# Patient Record
Sex: FEMALE | Race: AMERICAN INDIAN OR ALASKA NATIVE | NOT HISPANIC OR LATINO | Employment: OTHER | URBAN - METROPOLITAN AREA
[De-identification: names, ages, dates, MRNs, and addresses within clinical notes are randomized per-mention and may not be internally consistent; named-entity substitution may affect disease eponyms.]

---

## 2017-04-29 PROBLEM — K85.90 ACUTE PANCREATITIS: Status: ACTIVE | Noted: 2017-04-29

## 2017-04-30 PROBLEM — J43.9 PULMONARY EMPHYSEMA: Chronic | Status: ACTIVE | Noted: 2017-04-30

## 2017-04-30 PROBLEM — F41.9 CHRONIC ANXIETY: Status: ACTIVE | Noted: 2017-04-30

## 2017-04-30 PROBLEM — R11.0 NAUSEA: Status: ACTIVE | Noted: 2017-04-30

## 2017-04-30 PROBLEM — M47.816 DEGENERATIVE JOINT DISEASE (DJD) OF LUMBAR SPINE: Chronic | Status: ACTIVE | Noted: 2017-04-30

## 2017-04-30 PROBLEM — K21.9 GERD (GASTROESOPHAGEAL REFLUX DISEASE): Chronic | Status: ACTIVE | Noted: 2017-04-30

## 2017-04-30 PROBLEM — R31.0 HEMATURIA, GROSS: Status: ACTIVE | Noted: 2017-04-30

## 2017-04-30 PROBLEM — I10 BENIGN ESSENTIAL HYPERTENSION: Chronic | Status: ACTIVE | Noted: 2017-04-30

## 2017-04-30 PROBLEM — R10.13 EPIGASTRIC ABDOMINAL PAIN: Status: ACTIVE | Noted: 2017-04-30

## 2017-05-04 ENCOUNTER — TELEPHONE (OUTPATIENT)
Dept: GASTROENTEROLOGY | Facility: CLINIC | Age: 57
End: 2017-05-04

## 2017-05-04 DIAGNOSIS — K85.90 PANCREATITIS, UNSPECIFIED PANCREATITIS TYPE: Primary | ICD-10-CM

## 2017-05-10 ENCOUNTER — TELEPHONE (OUTPATIENT)
Dept: GASTROENTEROLOGY | Facility: CLINIC | Age: 57
End: 2017-05-10

## 2017-05-11 ENCOUNTER — TELEPHONE (OUTPATIENT)
Dept: ENDOSCOPY | Facility: HOSPITAL | Age: 57
End: 2017-05-11

## 2017-05-11 ENCOUNTER — TELEPHONE (OUTPATIENT)
Dept: GASTROENTEROLOGY | Facility: CLINIC | Age: 57
End: 2017-05-11

## 2017-05-11 RX ORDER — ASPIRIN 81 MG/1
81 TABLET ORAL DAILY
COMMUNITY
End: 2020-04-08 | Stop reason: ALTCHOICE

## 2017-05-11 NOTE — TELEPHONE ENCOUNTER
----- Message from Vika Peterson MA sent at 5/11/2017  8:56 AM CDT -----  Contact: [phone]  Art merino    Returning a call to Mena LUNA.    [phone]

## 2017-05-11 NOTE — TELEPHONE ENCOUNTER
Spoke with patient about Hx, allergies, meds and instructions for UEUS scheduled 5/19/17 at 1400.  Voices understanding.  Instructions mailed.

## 2017-05-19 ENCOUNTER — SURGERY (OUTPATIENT)
Age: 57
End: 2017-05-19

## 2017-05-19 ENCOUNTER — ANESTHESIA (OUTPATIENT)
Dept: ENDOSCOPY | Facility: HOSPITAL | Age: 57
End: 2017-05-19
Payer: COMMERCIAL

## 2017-05-19 ENCOUNTER — ANESTHESIA EVENT (OUTPATIENT)
Dept: ENDOSCOPY | Facility: HOSPITAL | Age: 57
End: 2017-05-19
Payer: COMMERCIAL

## 2017-05-19 ENCOUNTER — HOSPITAL ENCOUNTER (OUTPATIENT)
Facility: HOSPITAL | Age: 57
Discharge: HOME | End: 2017-05-19
Attending: INTERNAL MEDICINE | Admitting: INTERNAL MEDICINE
Payer: COMMERCIAL

## 2017-05-19 VITALS
HEIGHT: 67 IN | WEIGHT: 196 LBS | HEART RATE: 71 BPM | SYSTOLIC BLOOD PRESSURE: 134 MMHG | OXYGEN SATURATION: 100 % | BODY MASS INDEX: 30.76 KG/M2 | RESPIRATION RATE: 18 BRPM | TEMPERATURE: 98 F | DIASTOLIC BLOOD PRESSURE: 46 MMHG

## 2017-05-19 DIAGNOSIS — K85.90 ACUTE PANCREATITIS, UNSPECIFIED COMPLICATION STATUS, UNSPECIFIED PANCREATITIS TYPE: Primary | ICD-10-CM

## 2017-05-19 DIAGNOSIS — K85.90 PANCREATITIS: ICD-10-CM

## 2017-05-19 PROCEDURE — 37000008 HC ANESTHESIA 1ST 15 MINUTES: Performed by: INTERNAL MEDICINE

## 2017-05-19 PROCEDURE — 25000003 PHARM REV CODE 250: Performed by: REGISTERED NURSE

## 2017-05-19 PROCEDURE — 37000009 HC ANESTHESIA EA ADD 15 MINS: Performed by: INTERNAL MEDICINE

## 2017-05-19 PROCEDURE — D9220A PRA ANESTHESIA: Mod: CRNA,,, | Performed by: REGISTERED NURSE

## 2017-05-19 PROCEDURE — 43259 EGD US EXAM DUODENUM/JEJUNUM: CPT | Performed by: INTERNAL MEDICINE

## 2017-05-19 PROCEDURE — 43259 EGD US EXAM DUODENUM/JEJUNUM: CPT | Mod: ,,, | Performed by: INTERNAL MEDICINE

## 2017-05-19 PROCEDURE — D9220A PRA ANESTHESIA: Mod: ANES,,, | Performed by: ANESTHESIOLOGY

## 2017-05-19 PROCEDURE — 63600175 PHARM REV CODE 636 W HCPCS: Performed by: REGISTERED NURSE

## 2017-05-19 PROCEDURE — 25000003 PHARM REV CODE 250: Performed by: INTERNAL MEDICINE

## 2017-05-19 RX ORDER — LIDOCAINE HYDROCHLORIDE 10 MG/ML
INJECTION, SOLUTION INTRAVENOUS
Status: DISCONTINUED | OUTPATIENT
Start: 2017-05-19 | End: 2017-05-19

## 2017-05-19 RX ORDER — PROPOFOL 10 MG/ML
VIAL (ML) INTRAVENOUS CONTINUOUS PRN
Status: DISCONTINUED | OUTPATIENT
Start: 2017-05-19 | End: 2017-05-19

## 2017-05-19 RX ORDER — MIDAZOLAM HYDROCHLORIDE 1 MG/ML
INJECTION, SOLUTION INTRAMUSCULAR; INTRAVENOUS
Status: DISCONTINUED | OUTPATIENT
Start: 2017-05-19 | End: 2017-05-19

## 2017-05-19 RX ORDER — IBUPROFEN AND FAMOTIDINE 26.6; 8 MG/1; MG/1
TABLET ORAL 3 TIMES DAILY
COMMUNITY
End: 2017-10-03 | Stop reason: CLARIF

## 2017-05-19 RX ORDER — FENTANYL CITRATE 50 UG/ML
INJECTION, SOLUTION INTRAMUSCULAR; INTRAVENOUS
Status: DISCONTINUED | OUTPATIENT
Start: 2017-05-19 | End: 2017-05-19

## 2017-05-19 RX ORDER — SODIUM CHLORIDE 9 MG/ML
INJECTION, SOLUTION INTRAVENOUS CONTINUOUS
Status: DISCONTINUED | OUTPATIENT
Start: 2017-05-19 | End: 2017-05-22 | Stop reason: HOSPADM

## 2017-05-19 RX ADMIN — FENTANYL CITRATE 100 MCG: 50 INJECTION, SOLUTION INTRAMUSCULAR; INTRAVENOUS at 01:05

## 2017-05-19 RX ADMIN — LIDOCAINE HYDROCHLORIDE 100 MG: 10 INJECTION, SOLUTION INTRAVENOUS at 01:05

## 2017-05-19 RX ADMIN — MIDAZOLAM HYDROCHLORIDE 2 MG: 1 INJECTION, SOLUTION INTRAMUSCULAR; INTRAVENOUS at 01:05

## 2017-05-19 RX ADMIN — SODIUM CHLORIDE: 0.9 INJECTION, SOLUTION INTRAVENOUS at 01:05

## 2017-05-19 RX ADMIN — PROPOFOL 200 MCG/KG/MIN: 10 INJECTION, EMULSION INTRAVENOUS at 01:05

## 2017-05-19 NOTE — DISCHARGE INSTRUCTIONS
Endoscopic Ultrasound (EUS)    An endoscopic ultrasound (EUS) is a test to look at the inside of your gastrointestinal (GI) tract. It's commonly used to look for cancers or growths in the esophagus, stomach, pancreas, liver, and rectum. It can help to stage cancer (see how advanced a cancer is). EUS may also be used to help diagnose certain diseases or to drain cysts or abscesses.  What is EUS?  EUS shows both ultrasound images and live video of the GI tract. During the test, a flexible tube called an endoscope (scope) is used. At the end of the scope is a tiny video camera and light. The video camera sends live images to a monitor. The scope also contains a very small ultrasound device. This uses sound waves to create images and send them to a monitor.  A needle is passed through the scope. The needle can be used take a small sample of tissue for testing. This is called a biopsy. The needle can be used to take a sample of fluid. This is called fine-needle aspiration (FNA).  Risks and possible complications of EUS  Risks and possible complications include the following:  · Bleeding  · Infection  · A perforation (hole) in the digestive tract   · Risks of sedation or anesthesia   Before the test  Be prepared prior to the test:  · Tell your healthcare provider what medicine you take. This includes vitamins, herbs, and over-the-counter medicine. It also includes any blood thinners, such as warfarin, clopidogrel, ibuprofen, or daily aspirin. Ask your healthcare provider if you need to stop taking some or all of them before the test.  · You may be prescribed antibiotics to take before or after the test. This depends on the area being studied and what is done during the test. These medicines help prevent infection.  · Carefully follow the instructions for preparing for the test to make sure results are accurate. Instructions may include:  ¨ If youre having an EUS of the upper GI tract (esophagus, stomach, duodenum,  pancreas, liver):  § Do not eat or drink for 6 hours before the test.  ¨ If youre having an EUS of the lower GI tract (rectum):  § Before the test, do bowel prep as instructed to clean your rectum of stool. This may involve a clear liquid diet and using a laxative (liquid or pills) the night before the test. Or it may mean doing one or more enemas the morning of the test.  § Do not eat or drink for 6 hours before the test.  · Be sure to arrive on time at the facility. Bring your identification and health insurance card. Leave valuables at home. If you have them, bring X-rays or other test results with you.  Let the healthcare provider know  For your safety, tell the healthcare provider if you:  · Take insulin. Your dose may need to be changed on the day of your test.  · Are allergic to latex.  · Have any other allergies.  · Are taking blood thinners.   During the test  An endoscopic ultrasound usually takes place in a hospital. The procedure itself may take 1 to 2 hours. You will likely go home soon afterward. During the test:  · You lie on your left side on an exam table.  · An intravenous (IV) line will be put into a vein in your arm or hand. This line supplies fluids and medicines. To keep you comfortable during the test, you will be given a sedative medicine. This medicine prevents discomfort and will make you sleepy.  · If you are having an EUS of the upper GI tract, local anesthetic may be sprayed in your throat. This will help you be more comfortable as the healthcare provider inserts the scope. The healthcare provider then gently puts the flexible scope into your mouth or nose and down your throat.  · If youre having an EUS of the lower GI tract, the healthcare provider gently puts the flexible scope into your anus.  · During the test, the scope sends live video and ultrasound images from inside your body to nearby monitors. These are used to examine your GI tract. Specialized procedures, such as drainage,  are done as needed.  · The healthcare provider may discuss the results with you soon after the test. Biopsy results take several  days.  · In most cases, you can go home within a few hours of the test. When you leave the facility, have an adult family member or friend drive you, even if you don't feel that sleepy.  After the test  Here is what to expect after the test:  · You may feel tired from the sedative. This should wear off by the end of the day.  · If you had an upper digestive endoscopy, your throat may feel sore for a day or two. Over-the-counter sore throat lozenges and spray should help.  · You can eat and drink normally as soon as the test is done.  When to call the healthcare provider  Call your healthcare provider if you notice any of the following:  · Fever of 100.4°F (38.0°C) or higher, or as advised by your healthcare provider  · Shortness of breath  · Vomiting blood, blood in stool, or black stools  · Coughing or hoarse voice that wont go away   Date Last Reviewed: 7/1/2016  © 8255-4851 [employer]. [address] [place] [address]. All rights reserved. This information is not intended as a substitute for professional medical care. Always follow your healthcare professional's instructions.

## 2017-05-19 NOTE — ANESTHESIA POSTPROCEDURE EVALUATION
"Anesthesia Post Evaluation    Patient: Rosalia Fagan    Procedure(s) Performed: Procedure(s) (LRB):  ULTRASOUND-ENDOSCOPIC-UPPER (N/A)    Final Anesthesia Type: general  Patient location during evaluation: PACU  Patient participation: Yes- Able to Participate  Level of consciousness: awake and alert  Post-procedure vital signs: reviewed and stable  Pain management: adequate  Airway patency: patent  PONV status at discharge: No PONV  Anesthetic complications: no      Cardiovascular status: blood pressure returned to baseline  Respiratory status: unassisted  Hydration status: euvolemic  Follow-up not needed.        Visit Vitals    BP (!) 104/55 (BP Location: Left arm, Patient Position: Lying, BP Method: Automatic)    Pulse 85    Temp 36.6 °C (97.9 °F) (Temporal)    Resp 18    Ht 5' 7" (1.702 m)    Wt 88.9 kg (196 lb)    LMP  (LMP Unknown)    SpO2 97%    Breastfeeding No    BMI 30.7 kg/m2       Pain/Rhea Score: Pain Assessment Performed: Yes (5/19/2017  1:28 PM)  Presence of Pain: non-verbal indicators absent (5/19/2017  1:28 PM)  Rhea Score: 8 (5/19/2017  1:28 PM)      "

## 2017-05-19 NOTE — TRANSFER OF CARE
"Anesthesia Transfer of Care Note    Patient: Rosalia Fagan    Procedure(s) Performed: Procedure(s) (LRB):  ULTRASOUND-ENDOSCOPIC-UPPER (N/A)    Patient location: [facility]    Anesthesia Type: general    Transport from OR: Transported from OR on 2-3 L/min O2 by NC with adequate spontaneous ventilation    Post pain: adequate analgesia    Post assessment: no apparent anesthetic complications    Post vital signs: stable    Level of consciousness: awake    Nausea/Vomiting: no nausea/vomiting    Complications: none    Transfer of care protocol was followed      Last vitals:   Visit Vitals    BP (!) 104/55 (BP Location: Left arm, Patient Position: Lying, BP Method: Automatic)    Pulse 85    Temp 36.6 °C (97.9 °F) (Temporal)    Resp 18    Ht 5' 7" (1.702 m)    Wt 88.9 kg (196 lb)    LMP  (LMP Unknown)    SpO2 97%    Breastfeeding No    BMI 30.7 kg/m2     "

## 2017-05-19 NOTE — ANESTHESIA PREPROCEDURE EVALUATION
05/19/2017  Rosalia Fagan is a 56 y.o., female.    Anesthesia Evaluation    I have reviewed the Patient Summary Reports.     I have reviewed the Medications.     Review of Systems  Anesthesia Hx:  History of prior surgery of interest to airway management or planning:  Denies Personal Hx of Anesthesia complications.   Cardiovascular:   Hypertension    Pulmonary:   COPD    Hepatic/GI:   GERD Pancreatitis   Musculoskeletal:   Arthritis     Psych:   anxiety          Physical Exam  General:  Obesity    Airway/Jaw/Neck:  Airway Findings: Mouth Opening: Normal Tongue: Normal  General Airway Assessment: Adult  Mallampati: III  Improves to II with phonation.  TM Distance: Normal, at least 6 cm  Jaw/Neck Findings:  Neck ROM: Normal ROM       Chest/Lungs:  Chest/Lungs Findings: Normal Respiratory Rate     Heart/Vascular:  Heart Findings: Rhythm: Regular Rhythm        Mental Status:  Mental Status Findings:  Alert and Oriented         Anesthesia Plan  Type of Anesthesia, risks & benefits discussed:  Anesthesia Type:  general, spinal  Patient's Preference: davmelvinae  Intra-op Monitoring Plan: standard ASA monitors  Intra-op Monitoring Plan Comments:   Post Op Pain Control Plan:   Post Op Pain Control Plan Comments:   Induction:   IV  Beta Blocker:  Patient is not currently on a Beta-Blocker (No further documentation required).       Informed Consent: Patient understands risks and agrees with Anesthesia plan.  Questions answered. Anesthesia consent signed with patient.  ASA Score: 3     Day of Surgery Review of History & Physical:    H&P update referred to the surgeon.     Anesthesia Plan Notes: NPO confirmed.   No history of anesthesia problems.         Ready For Surgery From Anesthesia Perspective.

## 2017-05-19 NOTE — PLAN OF CARE
D/C instructions given to pt and family. Pt. Tolerating po fluids and denies pain and n/v at this time. IV dc'd. VSS. Family at bs for d/c.

## 2017-05-19 NOTE — DISCHARGE SUMMARY
Discharge Summary/Instructions for after an Upper EUS    Patient Name: Rosalia Fagan  Patient MRN: [id number]  Patient YOB: 1960    Friday, May 19, 2017  Layton Michaels MD    1.  Do Not eat or drink anything for 1 hour.  Try sips of water first.  If tolerated, resume your regular diet or one recommended by your physician.  2.  Do not drive, operate machinery, make critical decisions, or do activities that require coordination or balance for 24 hours.  3.  You may experience a sore throat for 24 to 48 hours.  You may use throat lozenges or gargle with warm salt water to relieve the discomfort.  4.  Because air was put into your stomach during the procedure, you may experience some belching.  5.  Go directly to the emergency room if you notice any of the following:     Chills and/or fever over 101   Persistent vomiting or vomiting with blood   Severe abdominal pain, other than gas cramps   Severe chest pain   Black, tarry stools    Your doctor recommends these additional instructions:    You are being discharged to home.   Your clinical course will be observed.    If you have any questions on the above instructions, call the GI Lab and ask for an endoscopy nurse.    If you have any problems, please call your physician.  EMERGENCY PHONE NUMBER: [phone]  LAB RESULTS: [phone]

## 2017-05-19 NOTE — IP AVS SNAPSHOT
[facility]  [address]  [facility] [address]  Phone: [phone]           Patient Discharge Instructions   Our goal is to set you up for success. This packet includes information on your condition, medications, and your home care.  It will help you care for yourself to prevent having to return to the hospital.     Please ask your nurse if you have any questions.      There are many details to remember when preparing to leave the hospital. Here is what you will need to do:    1. Take your medicine. If you are prescribed medications, review your Medication List on the following pages. You may have new medications to  at the pharmacy and others that you'll need to stop taking. Review the instructions for how and when to take your medications. Talk with your doctor or nurses if you are unsure of what to do.     2. Go to your follow-up appointments. Specific follow-up information is listed in the following pages. Your may be contacted by a nurse or clinical provider about future appointments. Be sure we have all of the phone numbers to reach you. Please contact your provider's office if you are unable to make an appointment.     3. Watch for warning signs. Your doctor or nurse will give you detailed warning signs to watch for and when to call for assistance. These instructions may also include educational information about your condition. If you experience any of warning signs to your health, call your doctor.           [facility]  Unless otherwise directed by your provider, please   contact [facility], our nurse care line   that is available for 24/7 assistance.     [phone] (toll-free)     Registered nurses in the [facility]   provide: appointment scheduling, clinical advisement, health education, and other advisory services.                  ** Verify the list of medication(s) below is accurate and up to date. Carry this with you in case of  emergency. If your medications have changed, please notify your healthcare provider.             Medication List      CONTINUE taking these medications        Additional Info                      alprazolam 0.5 MG tablet   Commonly known as:  XANAX   Refills:  0   Dose:  0.5 mg    Instructions:  Take 0.5 mg by mouth 3 (three) times daily as needed for Anxiety.     Begin Date    AM    Noon    PM    Bedtime       amlodipine-benazepril 10-20mg 10-20 mg per capsule   Commonly known as:  LOTREL   Refills:  0   Dose:  1 capsule    Instructions:  Take 1 capsule by mouth once daily.     Begin Date    AM    Noon    PM    Bedtime       aspirin 81 MG EC tablet   Commonly known as:  ECOTRIN   Refills:  0   Dose:  81 mg    Instructions:  Take 81 mg by mouth once daily.     Begin Date    AM    Noon    PM    Bedtime       cyclobenzaprine 10 MG tablet   Commonly known as:  FLEXERIL   Refills:  0   Dose:  10 mg    Instructions:  Take 10 mg by mouth 3 (three) times daily as needed for Muscle spasms.     Begin Date    AM    Noon    PM    Bedtime       DUEXIS 800-26.6 mg Tab   Refills:  0   Generic drug:  ibuprofen-famotidine    Instructions:  Take by mouth 3 (three) times daily.     Begin Date    AM    Noon    PM    Bedtime       gabapentin 300 MG capsule   Commonly known as:  NEURONTIN   Refills:  0   Dose:  300 mg    Instructions:  Take 300 mg by mouth 3 (three) times daily.     Begin Date    AM    Noon    PM    Bedtime       hydrocodone-acetaminophen 10-325mg  mg Tab   Commonly known as:  NORCO   Refills:  0   Dose:  1 tablet    Instructions:  Take 1 tablet by mouth every 6 (six) hours as needed for Pain.     Begin Date    AM    Noon    PM    Bedtime       spironolactone 100 MG tablet   Commonly known as:  ALDACTONE   Refills:  0   Dose:  100 mg    Instructions:  Take 100 mg by mouth once daily.     Begin Date    AM    Noon    PM    Bedtime                  Please bring to all follow up appointments:    1. A copy of your  discharge instructions.  2. All medicines you are currently taking in their original bottles.  3. Identification and insurance card.    Please arrive 15 minutes ahead of scheduled appointment time.    Please call 24 hours in advance if you must reschedule your appointment and/or time.        Your Future Surgeries/Procedures     May 19, 2017   Surgery with Layton Michaels MD   [facility] ([facility])    [address] [facility] [address]   [phone]                Discharge Instructions     Future Orders    Activity as tolerated     Call MD for:  difficulty breathing, headache or visual disturbances     Call MD for:  persistent nausea and vomiting     Call MD for:  severe uncontrolled pain     Call MD for:  temperature >100.4     Diet general     Questions:    Total calories:      Fat restriction, if any:      Protein restriction, if any:      Na restriction, if any:      Fluid restriction:      Additional restrictions:          Discharge Instructions         Endoscopic Ultrasound (EUS)    An endoscopic ultrasound (EUS) is a test to look at the inside of your gastrointestinal (GI) tract. It's commonly used to look for cancers or growths in the esophagus, stomach, pancreas, liver, and rectum. It can help to stage cancer (see how advanced a cancer is). EUS may also be used to help diagnose certain diseases or to drain cysts or abscesses.  What is EUS?  EUS shows both ultrasound images and live video of the GI tract. During the test, a flexible tube called an endoscope (scope) is used. At the end of the scope is a tiny video camera and light. The video camera sends live images to a monitor. The scope also contains a very small ultrasound device. This uses sound waves to create images and send them to a monitor.  A needle is passed through the scope. The needle can be used take a small sample of tissue for testing. This is called a biopsy. The needle can be used to  take a sample of fluid. This is called fine-needle aspiration (FNA).  Risks and possible complications of EUS  Risks and possible complications include the following:  · Bleeding  · Infection  · A perforation (hole) in the digestive tract   · Risks of sedation or anesthesia   Before the test  Be prepared prior to the test:  · Tell your healthcare provider what medicine you take. This includes vitamins, herbs, and over-the-counter medicine. It also includes any blood thinners, such as warfarin, clopidogrel, ibuprofen, or daily aspirin. Ask your healthcare provider if you need to stop taking some or all of them before the test.  · You may be prescribed antibiotics to take before or after the test. This depends on the area being studied and what is done during the test. These medicines help prevent infection.  · Carefully follow the instructions for preparing for the test to make sure results are accurate. Instructions may include:  ¨ If youre having an EUS of the upper GI tract (esophagus, stomach, duodenum, pancreas, liver):  § Do not eat or drink for 6 hours before the test.  ¨ If youre having an EUS of the lower GI tract (rectum):  § Before the test, do bowel prep as instructed to clean your rectum of stool. This may involve a clear liquid diet and using a laxative (liquid or pills) the night before the test. Or it may mean doing one or more enemas the morning of the test.  § Do not eat or drink for 6 hours before the test.  · Be sure to arrive on time at the facility. Bring your identification and health insurance card. Leave valuables at home. If you have them, bring X-rays or other test results with you.  Let the healthcare provider know  For your safety, tell the healthcare provider if you:  · Take insulin. Your dose may need to be changed on the day of your test.  · Are allergic to latex.  · Have any other allergies.  · Are taking blood thinners.   During the test  An endoscopic ultrasound usually takes  place in a hospital. The procedure itself may take 1 to 2 hours. You will likely go home soon afterward. During the test:  · You lie on your left side on an exam table.  · An intravenous (IV) line will be put into a vein in your arm or hand. This line supplies fluids and medicines. To keep you comfortable during the test, you will be given a sedative medicine. This medicine prevents discomfort and will make you sleepy.  · If you are having an EUS of the upper GI tract, local anesthetic may be sprayed in your throat. This will help you be more comfortable as the healthcare provider inserts the scope. The healthcare provider then gently puts the flexible scope into your mouth or nose and down your throat.  · If youre having an EUS of the lower GI tract, the healthcare provider gently puts the flexible scope into your anus.  · During the test, the scope sends live video and ultrasound images from inside your body to nearby monitors. These are used to examine your GI tract. Specialized procedures, such as drainage, are done as needed.  · The healthcare provider may discuss the results with you soon after the test. Biopsy results take several  days.  · In most cases, you can go home within a few hours of the test. When you leave the facility, have an adult family member or friend drive you, even if you don't feel that sleepy.  After the test  Here is what to expect after the test:  · You may feel tired from the sedative. This should wear off by the end of the day.  · If you had an upper digestive endoscopy, your throat may feel sore for a day or two. Over-the-counter sore throat lozenges and spray should help.  · You can eat and drink normally as soon as the test is done.  When to call the healthcare provider  Call your healthcare provider if you notice any of the following:  · Fever of 100.4°F (38.0°C) or higher, or as advised by your healthcare provider  · Shortness of breath  · Vomiting blood, blood in stool, or  black stools  · Coughing or hoarse voice that wont go away   Date Last Reviewed: 7/1/2016 © 2000-2016 [employer]. [address] [place] [address], [place] [address]. All rights reserved. This information is not intended as a substitute for professional medical care. Always follow your healthcare professional's instructions.          Instructions    Discharge Summary/Instructions for after an Upper EUS  Patient Name: Rosalia Fagan  Patient MRN: [id number]  Patient YOB: 1960  Friday, May 19, 2017  Layton Michaels MD  1.  Do Not eat or drink anything for 1 hour.  Try sips of water first.  If   tolerated, resume your regular diet or one recommended by your physician.  2.  Do not drive, operate machinery, make critical decisions, or do   activities that require coordination or balance for 24 hours.  3.  You may experience a sore throat for 24 to 48 hours.  You may use throat   lozenges or gargle with warm salt water to relieve the discomfort.  4.  Because air was put into your stomach during the procedure, you may   experience some belching.  5.  Go directly to the emergency room if you notice any of the following:   Chills and/or fever over 101   Persistent vomiting or vomiting with blood   Severe abdominal pain, other than gas cramps   Severe chest pain   Black, tarry stools  Your doctor recommends these additional instructions:  You are being discharged to home.   Your clinical course will be observed.  If you have any questions on the above instructions, call the GI Lab and ask   for an endoscopy nurse.  If you have any problems, please call your physician.  EMERGENCY PHONE NUMBER: [phone]  LAB RESULTS: [phone]  Layton Michaels MD  5/19/2017 1:34:37 PM  This report has been verified and signed electronically.         Admission Information     Date & Time Provider Department CSN    5/19/2017 12:32 PM Layton Michaels MD [facility] [id number]      Care Providers      "Provider Role Specialty Primary office phone    Layton Michaels MD Attending Provider Gastroenterology [phone]    Layton Michaels MD Surgeon  Gastroenterology [phone]      Your Vitals Were     BP Pulse Temp Resp Height Weight    104/55 (BP Location: Left arm, Patient Position: Lying, BP Method: Automatic) 85 97.9 °F (36.6 °C) (Temporal) 18 5' 7" (1.702 m) 88.9 kg (196 lb)    Last Period SpO2 BMI          (LMP Unknown) 97% 30.7 kg/m2        Recent Lab Values     No lab values to display.      Allergies as of 5/19/2017     No Known Allergies      Advance Directives     An advance directive is a document which, in the event you are no longer able to make decisions for yourself, tells your healthcare team what kind of treatment you do or do not want to receive, or who you would like to make those decisions for you.  If you do not currently have an advance directive, [facility] encourages you to create one.  For more information call:  [phone]), [phone] ([phone]),  or log on to [email or web address].        Smoking Cessation     If you would like to quit smoking:   You may be eligible for free services if you are a [place] resident and started smoking cigarettes before September 1, 1988.  Call the Smoking Cessation [employer] ([facility]) toll free at (183) 651-9431 or (655) 193-5130.   Call 1-800-QUIT-NOW if you do not meet the above criteria.   Contact us via email: [email or web address]   View our website for more information: [email or web address]        Language Assistance Services     ATTENTION: Language assistance services are available, free of charge. Please call [phone].      ATENCIÓN: Si habla español, tiene a anne disposición servicios gratuitos de asistencia lingüística. Llame al [phone].     CHÚ Ý: N?u b?n nói Ti?ng Vi?t, có các d?ch v? h? tr? ngôn ng? mi?n phí dành cho b?n. G?i s? [phone].         [facility] complies with " applicable Federal civil rights laws and does not discriminate on the basis of race, color, national origin, age, disability, or sex.

## 2017-05-19 NOTE — PATIENT INSTRUCTIONS
Discharge Summary/Instructions for after an Upper EUS  Patient Name: Rosalia Fagan  Patient MRN: [id number]  Patient YOB: 1960  Friday, May 19, 2017  Layton Michaels MD  1.  Do Not eat or drink anything for 1 hour.  Try sips of water first.  If   tolerated, resume your regular diet or one recommended by your physician.  2.  Do not drive, operate machinery, make critical decisions, or do   activities that require coordination or balance for 24 hours.  3.  You may experience a sore throat for 24 to 48 hours.  You may use throat   lozenges or gargle with warm salt water to relieve the discomfort.  4.  Because air was put into your stomach during the procedure, you may   experience some belching.  5.  Go directly to the emergency room if you notice any of the following:   Chills and/or fever over 101   Persistent vomiting or vomiting with blood   Severe abdominal pain, other than gas cramps   Severe chest pain   Black, tarry stools  Your doctor recommends these additional instructions:  You are being discharged to home.   Your clinical course will be observed.  If you have any questions on the above instructions, call the GI Lab and ask   for an endoscopy nurse.  If you have any problems, please call your physician.  EMERGENCY PHONE NUMBER: [phone]  LAB RESULTS: [phone]  Layton Michaels MD  5/19/2017 1:34:37 PM  This report has been verified and signed electronically.

## 2017-05-19 NOTE — ANESTHESIA RELEASE NOTE
"Anesthesia Release from PACU Note    Patient: Rosalia Fagan    Procedure(s) Performed: Procedure(s) (LRB):  ULTRASOUND-ENDOSCOPIC-UPPER (N/A)    Anesthesia type: general    Post pain: Adequate analgesia    Post assessment: no apparent anesthetic complications    Last Vitals:   Visit Vitals    BP (!) 104/55 (BP Location: Left arm, Patient Position: Lying, BP Method: Automatic)    Pulse 85    Temp 36.6 °C (97.9 °F) (Temporal)    Resp 18    Ht 5' 7" (1.702 m)    Wt 88.9 kg (196 lb)    LMP  (LMP Unknown)    SpO2 97%    Breastfeeding No    BMI 30.7 kg/m2       Post vital signs: stable    Level of consciousness: awake    Nausea/Vomiting: no nausea/no vomiting    Complications: none    Airway Patency: patent    Respiratory: unassisted    Cardiovascular: stable and blood pressure at baseline    Hydration: euvolemic  "

## 2017-06-15 ENCOUNTER — OFFICE VISIT (OUTPATIENT)
Dept: NEPHROLOGY | Facility: CLINIC | Age: 57
End: 2017-06-15
Payer: COMMERCIAL

## 2017-06-15 VITALS
WEIGHT: 227.06 LBS | OXYGEN SATURATION: 99 % | DIASTOLIC BLOOD PRESSURE: 60 MMHG | SYSTOLIC BLOOD PRESSURE: 130 MMHG | HEART RATE: 71 BPM | HEIGHT: 67 IN | BODY MASS INDEX: 35.64 KG/M2

## 2017-06-15 DIAGNOSIS — N28.1 CYST OF LEFT KIDNEY: ICD-10-CM

## 2017-06-15 DIAGNOSIS — R31.29 MICROHEMATURIA: Primary | ICD-10-CM

## 2017-06-15 PROCEDURE — 99999 PR PBB SHADOW E&M-EST. PATIENT-LVL III: CPT | Mod: PBBFAC,,, | Performed by: INTERNAL MEDICINE

## 2017-06-15 PROCEDURE — 99204 OFFICE O/P NEW MOD 45 MIN: CPT | Mod: S$GLB,,, | Performed by: INTERNAL MEDICINE

## 2017-06-15 NOTE — PROGRESS NOTES
Subjective:       Patient ID: Rosalia Fagan is a 56 y.o.  or  female who presents for new evaluation of Other (kidney cyst )    HPI     Ms. Hendricks was seen in nephrology clinic for new patient evaluation for kidney cyst. This is a self referral. Pt arrived in the clinic accompanied by her sister. Pt has hypertension, has been on benazepril based regimen, DJD, has been taking Ibuprofen based pain medicines, anxiety, h/o tobacco smoking which she quit only in 4/17 while hospitalized, emphysema and other medical problems.     She reports she was hospitalized in 4/17 in [place] for abdominal pain. She was noticed to have leukocytosis. She underwent CT AP with IV contrast from 4/29/17. It suggested pancreatitis involving head of pancreas. It also showed incidentally found left adrenal adenoma and left renal cyst size 2.6 cm. Radiology report did not comment on the nature of the cyst but there was no mention of any solid mass or complex cyst. Pt had urinalysis from that hospital admission and it showed 20 RBC which on repeat test was down to 5 RBC. Urine culture was negative. Subsequent to this hospitalization pt had further work up by GI and had EUS and essentially informed there was no abnormality with her pancreas.    Pt reports due to finding of cyst on her kidney she herself and her siblings were alarmed as one of her brothers was diagnosed with kidney cancer in the past and he had to undergo kidney removal. She states she saw a local kidney specialist in [place] but she decided to get a second opinion and now wants to establish care with [facility].     She denies any dysuria/ blood in the urine/ flank pain/ fever/ weight loss/ loss of appetite. She has chronic back pain due to DJD and is possibly looking at back surgery.     Available labs were reviewed which showed creatinine of 0.7, K 3.8, Ca 8.8, Hb 14.4 from 5/2/17.    Review of Systems   Constitutional: Negative for activity change,  appetite change, chills, fatigue and fever.   HENT: Negative for ear discharge and ear pain.    Eyes: Negative for discharge and redness.   Respiratory: Negative for cough, shortness of breath and wheezing.    Cardiovascular: Negative for chest pain and leg swelling.   Gastrointestinal: Negative for blood in stool, diarrhea, nausea and vomiting.   Endocrine: Negative for polydipsia and polyuria.   Genitourinary: Negative for decreased urine volume, difficulty urinating, dysuria, flank pain, frequency and hematuria.   Musculoskeletal: Positive for back pain. Negative for myalgias.   Skin: Negative for pallor and rash.   Neurological: Negative for dizziness, light-headedness and headaches.   Psychiatric/Behavioral: Negative for behavioral problems. The patient is nervous/anxious.        Objective:      Physical Exam   Constitutional: She is oriented to person, place, and time. She appears well-developed and well-nourished. No distress.   HENT:   Head: Normocephalic and atraumatic.   Mouth/Throat: Oropharynx is clear and moist.   Eyes: Right eye exhibits no discharge. Left eye exhibits no discharge. No scleral icterus.   Neck: Neck supple.   Cardiovascular: Normal rate, regular rhythm and normal heart sounds.  Exam reveals no friction rub.    No murmur heard.  Pulmonary/Chest: Effort normal and breath sounds normal. No respiratory distress. She has no wheezes. She has no rales.   Abdominal: Soft. She exhibits no distension. There is no tenderness.   Abdominal obesity   Genitourinary:   Genitourinary Comments: deferred   Musculoskeletal: She exhibits no edema.   Neurological: She is alert and oriented to person, place, and time.   Speech normal, cognition normal, moving all 4 limbs   Skin: Skin is warm and dry.   Psychiatric: She has a normal mood and affect. Her behavior is normal.   Nursing note and vitals reviewed.      Assessment:       1. Microhematuria    2.      Left kidney cyst    Plan:     Pt has family h/o  kidney cancer. She has incidentally found left kidney cyst. CT AP with IV contrast from 4/29/17 does not describe any specific characteristic features of the cyst. But of note there is no mention of any solid mass. Pt does have microhematuria. Reason for her abdominal pain due to which she was hospitalized in 4/17 is not clear. She does not have any urinary or systemic symptoms. I explained this to her. Also discussed with her that she has incidentally found left adrenal adenoma and she would like to discuss this with her PCP to consider an endocrine evaluation. I explained to her that she need a follow up imaging of her left kidney cyst and confirm that there is no rapid growth and also to rule out a solid mass. Microhematuria could be related but she will also need urology evaluation of both these problems especially with her family h/o kidney cancer. She expressed understanding. I also encouraged her to avoid longstanding use of NSAID and risk of CKD especially with her having hypertension. I stressed importance of avoiding tobacco products. She will continue to follow with her PCP for hypertension management. She has as such stable creatinine.    Plan  - periodically monitor renal panel for electrolytes, acid base status, eGFR  - urology referral   - low salt diet  - follow urine studies for proteinuria, UA with microscopy, urine culture   - obtain US kidney for kidney size, rule out mass/ cyst  - avoid NSAID where possible  - continue ACE-I containing regimen for RAAS blockade    Total time spent was 45 minutes with >50% time spent in face to face evaluation, counseling about possible etiology, work up, monitoring, natural course of illness, recommendations.   RTC 6-8 weeks   Plan, labs, recommendations were discussed with patient, her questions were answered to her satisfaction.

## 2017-06-19 ENCOUNTER — TELEPHONE (OUTPATIENT)
Dept: NEPHROLOGY | Facility: CLINIC | Age: 57
End: 2017-06-19

## 2017-06-19 NOTE — TELEPHONE ENCOUNTER
I called pt to discuss the US results with her. Per radiology report she has a cyst on left kidney, size 3.1 cm. She has been referred to see urology and has appointment set up for tomorrow.

## 2017-06-27 ENCOUNTER — OFFICE VISIT (OUTPATIENT)
Dept: UROLOGY | Facility: CLINIC | Age: 57
End: 2017-06-27
Payer: COMMERCIAL

## 2017-06-27 VITALS
WEIGHT: 227.06 LBS | BODY MASS INDEX: 35.56 KG/M2 | SYSTOLIC BLOOD PRESSURE: 150 MMHG | HEART RATE: 75 BPM | DIASTOLIC BLOOD PRESSURE: 91 MMHG

## 2017-06-27 DIAGNOSIS — R31.29 MICROSCOPIC HEMATURIA: Primary | ICD-10-CM

## 2017-06-27 PROCEDURE — 99204 OFFICE O/P NEW MOD 45 MIN: CPT | Mod: 25,S$GLB,, | Performed by: PHYSICIAN ASSISTANT

## 2017-06-27 PROCEDURE — 88112 CYTOPATH CELL ENHANCE TECH: CPT | Performed by: PATHOLOGY

## 2017-06-27 PROCEDURE — 81002 URINALYSIS NONAUTO W/O SCOPE: CPT | Mod: S$GLB,,, | Performed by: PHYSICIAN ASSISTANT

## 2017-06-27 PROCEDURE — 99999 PR PBB SHADOW E&M-EST. PATIENT-LVL III: CPT | Mod: PBBFAC,,, | Performed by: PHYSICIAN ASSISTANT

## 2017-06-27 PROCEDURE — 88112 CYTOPATH CELL ENHANCE TECH: CPT | Mod: 26,,, | Performed by: PATHOLOGY

## 2017-06-27 RX ORDER — CIPROFLOXACIN 250 MG/1
500 TABLET, FILM COATED ORAL ONCE
Status: CANCELLED | OUTPATIENT
Start: 2017-06-27 | End: 2017-06-27

## 2017-06-27 RX ORDER — LIDOCAINE HYDROCHLORIDE 20 MG/ML
JELLY TOPICAL ONCE
Status: CANCELLED | OUTPATIENT
Start: 2017-06-27 | End: 2017-06-27

## 2017-06-27 NOTE — PROGRESS NOTES
CHIEF COMPLAINT:    Mrs. Fagan is a 56 y.o. female presenting for microscopic hematuria.  PRESENTING ILLNESS:    Rosalia Fagan is a 56 y.o. female who presents for microscopic hematuria.  She was referred by Dr. Davis in nephrology.  She was noted to have 20 RBC on microscopic u/a on 4/29/17.  Repeat testing showed 5 RBC on 5/2/17.  Urine culture on 4/29/17 was negative.    She denies gross hematuria.    She denies dysuria, frequency, urgency.  She reports a good urinary stream and complete bladder emptying.  She does not have any urinary complaints today.    She also reports right back pain which radiates to the right abdomen.  When she stands up or changes position the pain resolves.  She also reports pain that radiates down the leg.  She sees Dr. Nish Geiger in orthopedics.  She was scheduled to have lower back surgery but states it is postponed until after she is evaluated by urology.     Previous/Current Smoker: quit 2 months ago.  Smoked 12 years, about 0.5 pack a day.    Radiation therapy to pelvis: no  Chemotherapy: no  Personal/ family history of bladder/ kidney cancer: brother had kidney cancer.    Exposure to harmful chemicals: no  History of kidney stones: no    She had a CT abdomen/pelvis w/ on 4/29/17 for evaluation of abdominal pain.    CT scan showed: 1.  Mild acute pancreatic head pancreatitis but no focal abscess  2.  Left adrenal adenoma  3.  Left renal cyst  4.  Diverticulosis but no diverticulitis.    Renal ultrasound 6/16/17: 3.1 cm cyst in the left kidney borders incompletely evaluated a normal-appearing right kidney    REVIEW OF SYSTEMS:  Constitutional: Negative for fever and chills.   HENT: Negative for hearing loss.   Eyes: Negative for visual disturbance.   Respiratory: Negative for shortness of breath.   Cardiovascular: Negative for chest pain.   Gastrointestinal: Negative for vomiting, and constipation.   Genitourinary:  Negative for urgency, frequency, nocturia, incontinence,  dysuria, hematuria, intermittency, hesitancy,  difficulty urinating, incomplete bladder emptying, and decreased urine volume.   Neurological: Negative for dizziness.   Hematological: Does not bruise/bleed easily.   Psychiatric/Behavioral: Negative for confusion.     PATIENT HISTORY:    Past Medical History:   Diagnosis Date    Anxiety     Degenerative joint disease (DJD) of lumbar spine     Fibroids, intramural 02/2016    GERD (gastroesophageal reflux disease)     History of shingles 02/2016    Right flank    Hypertension        Past Surgical History:   Procedure Laterality Date    angiogram march 31,2016 03/31/2016    APPENDECTOMY      BREAST SURGERY Right 1992    biopsy benign    CARDIAC CATHETERIZATION      CHOLECYSTECTOMY      HYSTERECTOMY         Family History   Problem Relation Age of Onset    Arthritis Mother     Hyperlipidemia Mother     Cancer Sister     Cancer Brother     Cancer Maternal Aunt        Social History     Social History    Marital status:      Spouse name: N/A    Number of children: N/A    Years of education: N/A     Occupational History    Not on file.     Social History Main Topics    Smoking status: Former Smoker     Packs/day: 0.50     Types: Cigarettes    Smokeless tobacco: Not on file    Alcohol use No    Drug use: No    Sexual activity: Not on file     Other Topics Concern    Not on file     Social History Narrative    No narrative on file       Allergies:  Review of patient's allergies indicates no known allergies.    Medications:    Current Outpatient Prescriptions:     alprazolam (XANAX) 0.5 MG tablet, Take 0.5 mg by mouth 3 (three) times daily as needed for Anxiety., Disp: , Rfl:     amlodipine-benazepril 10-20mg (LOTREL) 10-20 mg per capsule, Take 1 capsule by mouth once daily., Disp: , Rfl:     aspirin (ECOTRIN) 81 MG EC tablet, Take 81 mg by mouth once daily., Disp: , Rfl:     cyclobenzaprine (FLEXERIL) 10 MG tablet, Take 10 mg by mouth 3  (three) times daily as needed for Muscle spasms., Disp: , Rfl:     gabapentin (NEURONTIN) 300 MG capsule, Take 300 mg by mouth 3 (three) times daily., Disp: , Rfl:     hydrocodone-acetaminophen 10-325mg (NORCO)  mg Tab, Take 1 tablet by mouth every 6 (six) hours as needed for Pain., Disp: , Rfl:     spironolactone (ALDACTONE) 100 MG tablet, Take 100 mg by mouth once daily., Disp: , Rfl:     ibuprofen-famotidine (DUEXIS) 800-26.6 mg Tab, Take by mouth 3 (three) times daily., Disp: , Rfl:     PHYSICAL EXAMINATION:    Constitutional: She appears well-developed and well-nourished.  She is in no apparent distress.    Eyes: No scleral icterus noted bilaterally.  No discharge noted bilaterally.    Cardiovascular: Normal rate.  No pitting edema noted in lower extremities bilaterally    Pulmonary/Chest: Effort normal. No respiratory distress.     Abdominal:  She exhibits no distension.  There is no CVA tenderness.     Lymphadenopathy:          Right: No supraclavicular adenopathy present.        Left: No supraclavicular adenopathy present.     Neurological: She is alert and oriented to person, place, and time.     Skin: Skin is warm and dry.     Psych: Cooperative with normal affect.    Physical Exam   Musculoskeletal:        Back:          LABS:    U/a: 1.015, pH 5, tr blood otherwise negative.      IMPRESSION:    Encounter Diagnoses   Name Primary?    Microscopic hematuria Yes       PLAN:    I explained to the patient that the causes of hematuria, whether it be gross hematuria or microhematuria, are many. Fortunately, for patients with microhematuria, the likelihood of finding an underlying  malignancy as the cause of the hematuria is very low at 1-2%.   Hematuria work up discussed in detail.  Will proceed with hematuria work up:  Patient had CT abd/pelvis w contrast on 4/29 which did not show any renal masses.   Will proceed with cysto.       Urine cytology    Follow up for cysto.      Chelsie Alvarado PA-C

## 2017-06-27 NOTE — LETTER
June 27, 2017      Angie Davis MD  [address]  [place] [facility] [address]           [facility] - Urology 4th Floor  [address]  [facility] [address]  Phone: [phone]          Patient: Rosalia Fagan   MR Number: [id number]   YOB: 1960   Date of Visit: 6/27/2017       Dear Dr. Angie Davis:    Thank you for referring Rosalia Fagan to me for evaluation. Attached you will find relevant portions of my assessment and plan of care.    If you have questions, please do not hesitate to call me. I look forward to following Rosalia Fagan along with you.    Sincerely,    Chelsie Alvarado PA-C    Enclosure  CC:  No Recipients    If you would like to receive this communication electronically, please contact [email or web address] or [phone] to request more information on [facility] [facility] access.    For providers and/or their staff who would like to refer a patient to [facility], please contact us through our one-stop-shop provider referral line, [facility], at [phone].    If you feel you have received this communication in error or would no longer like to receive these types of communications, please e-mail [email or web address]

## 2017-07-03 ENCOUNTER — TELEPHONE (OUTPATIENT)
Dept: UROLOGY | Facility: CLINIC | Age: 57
End: 2017-07-03

## 2017-08-14 ENCOUNTER — HOSPITAL ENCOUNTER (OUTPATIENT)
Dept: UROLOGY | Facility: HOSPITAL | Age: 57
Discharge: HOME | End: 2017-08-14
Attending: UROLOGY
Payer: COMMERCIAL

## 2017-08-14 VITALS
TEMPERATURE: 98 F | DIASTOLIC BLOOD PRESSURE: 83 MMHG | RESPIRATION RATE: 16 BRPM | SYSTOLIC BLOOD PRESSURE: 151 MMHG | HEART RATE: 81 BPM | BODY MASS INDEX: 35.29 KG/M2 | HEIGHT: 67 IN | WEIGHT: 224.88 LBS

## 2017-08-14 DIAGNOSIS — R31.29 MICROSCOPIC HEMATURIA: ICD-10-CM

## 2017-08-14 PROCEDURE — 52000 CYSTOURETHROSCOPY: CPT

## 2017-08-14 PROCEDURE — 52000 CYSTOURETHROSCOPY: CPT | Mod: ,,, | Performed by: UROLOGY

## 2017-08-14 RX ORDER — CIPROFLOXACIN 500 MG/1
500 TABLET ORAL ONCE
Status: COMPLETED | OUTPATIENT
Start: 2017-08-14 | End: 2017-08-14

## 2017-08-14 RX ORDER — LIDOCAINE HYDROCHLORIDE 20 MG/ML
JELLY TOPICAL ONCE
Status: COMPLETED | OUTPATIENT
Start: 2017-08-14 | End: 2017-08-14

## 2017-08-14 RX ADMIN — CIPROFLOXACIN 500 MG: 500 TABLET ORAL at 10:08

## 2017-08-14 RX ADMIN — LIDOCAINE HYDROCHLORIDE: 20 JELLY TOPICAL at 10:08

## 2017-08-14 NOTE — PROCEDURES
CYSTOSCOPY REPORT    Pre Procedure Diagnosis:  Microscopic hematuria    Post Procedure Diagnosis:   Normal lower urinary tract    Anesthesia: 10 cc 2% lidocaine jelly applied per urethra.    14 FR Flexible Olympus cystoscope used.    FINDINGS:   Dome, anterior, posterior, lateral walls and bladder base free of urothelial abnormalities. Right and left ureteral orifices in the normal postion and configuration, both effluxed clear urine.  Bladder neck and urethra were normal.    Specimen:  none    The patient was taken to the cystoscopy suite and placed in dorsal lithotomy position.  The genitalia was prepped and draped  in the usual sterile fashion.  Two percent lidocaine jelly was inserted in the urethra.  After sufficent time had passed to allow good local anesthesia, the cystoscope was inserted in the urethra and passed into the bladder visualizing the urethra along its entire course.  The dome, anterior, posterior and lateral walls were examined systematically.  The ureteral orifices were in their usual position and configuration.  The cystoscope was turned upon itself 180 degrees to visualize the bladder neck.  The cystoscope was then brought to the level of the bladder neck, the water was turned on and the urethra was visualized.  The cystoscope was removed and the patient was instructed to urinate prior to leaving the office.     Post procedure medication:  Cipro 500 mg x 1     ASSESSMENT/PLAN:  56 year old woman status post flexible cystoscopy.  1. Push fluids for 24 hours.  2. May see blood in the urine, this should gradually improve over the next 2-3 days.  3. The patient was instructed to return to the office or go to the emergency should fever, chills, cloudy urine, or inability to urinate develop.  4. Follow up as needed

## 2017-08-14 NOTE — H&P
CHIEF COMPLAINT:    Mrs. Fagan is a 56 y.o. female presenting for a flexible cystoscopy to complete a microscopic hematuria workup.    PRESENTING ILLNESS:    Rosalia Fagan is a 56 y.o. female who has a history of microscopic hematuria.  She had a CT abdomen/pelvis on 4/29/2017 which showed a cyst on the right side, this was seen again on renal ultrasound 6/16/2017.    REVIEW OF SYSTEMS:    Review of Systems   Constitutional: Negative.    HENT: Negative.    Eyes: Negative.    Respiratory: Negative.    Cardiovascular: Negative.    Gastrointestinal: Negative.    Genitourinary: Negative.    Musculoskeletal: Negative.    Skin: Negative.    Neurological: Negative.    Endo/Heme/Allergies: Negative.    Psychiatric/Behavioral: The patient is nervous/anxious.      PATIENT HISTORY:    Past Medical History:   Diagnosis Date    Anxiety     Degenerative joint disease (DJD) of lumbar spine     Fibroids, intramural 02/2016    GERD (gastroesophageal reflux disease)     History of shingles 02/2016    Right flank    Hypertension        Past Surgical History:   Procedure Laterality Date    angiogram march 31,2016 03/31/2016    APPENDECTOMY      BREAST SURGERY Right 1992    biopsy benign    CARDIAC CATHETERIZATION      CHOLECYSTECTOMY      HYSTERECTOMY         Family History   Problem Relation Age of Onset    Arthritis Mother     Hyperlipidemia Mother     Cancer Sister     Cancer Brother     Cancer Maternal Aunt      Social History    Marital status:      Social History Main Topics    Smoking status: Former Smoker     Packs/day: 0.50     Types: Cigarettes    Smokeless tobacco: Not on file    Alcohol use No    Drug use: No    Sexual activity: Not on file       Allergies:  Review of patient's allergies indicates no known allergies.    Medications:  reviewed      PHYSICAL EXAMINATION:    The patient generally appears in good health, is appropriately interactive, and is in no apparent distress.    Skin: No  lesions.    Mental: Cooperative with normal affect.    Neuro: Grossly intact.    HEENT: Normal. No evidence of lymphadenopathy.    Chest:  normal inspiratory effort.    Abdomen:  Soft, non-tender. No masses or organomegaly. Bladder is not palpable. No evidence of flank discomfort. No evidence of inguinal hernia.    Extremities: No clubbing, cyanosis, or edema      LABS:    Lab Results   Component Value Date    BUN 17 05/02/2017    CREATININE 0.70 05/02/2017       IMPRESSION:    Microscopic hematuria    PLAN:    1.  For flexible cystoscopy to complete the workup

## 2017-08-14 NOTE — PATIENT INSTRUCTIONS
What to Expect After a Cystoscopy  For the next 24-48 hours, you may feel a mild burning when you urinate. This burning is normal and expected. Drink plenty of water to dilute the urine to help relieve the burning sensation. You may also see a small amount of blood in your urine after the procedure.    Unless you are already taking antibiotics, you may be given an antibiotic after the test to prevent infection.    Signs and Symptoms to Report  Call the [facility] at [phone] if you develop any of the following:  · Fever of 101 degrees or higher  · Chills or persistent bleeding  · Inability to urinate

## 2017-09-06 PROBLEM — K52.9 COLITIS: Status: ACTIVE | Noted: 2017-09-06

## 2017-10-10 PROBLEM — R93.3 ABNORMAL COMPUTED TOMOGRAPHY OF LARGE INTESTINE: Status: ACTIVE | Noted: 2017-10-10

## 2017-10-16 PROBLEM — M48.061 LUMBAR SPINAL STENOSIS: Status: ACTIVE | Noted: 2017-10-16

## 2017-11-07 PROBLEM — M48.061 SPINAL STENOSIS OF LUMBAR REGION: Status: ACTIVE | Noted: 2017-11-07

## 2018-10-03 PROBLEM — R10.9 INTRACTABLE ABDOMINAL PAIN: Status: ACTIVE | Noted: 2018-10-03

## 2019-10-09 PROBLEM — R31.29 MICROHEMATURIA: Status: RESOLVED | Noted: 2017-06-15 | Resolved: 2019-10-09

## 2019-10-09 PROBLEM — K55.9 ISCHEMIC COLITIS: Status: ACTIVE | Noted: 2017-10-01

## 2019-10-09 PROBLEM — K52.9 COLITIS: Status: RESOLVED | Noted: 2017-09-06 | Resolved: 2019-10-09

## 2019-10-09 PROBLEM — R10.13 EPIGASTRIC ABDOMINAL PAIN: Status: RESOLVED | Noted: 2017-04-30 | Resolved: 2019-10-09

## 2019-10-09 PROBLEM — R11.0 NAUSEA: Status: RESOLVED | Noted: 2017-04-30 | Resolved: 2019-10-09

## 2019-10-09 PROBLEM — R93.3 ABNORMAL COMPUTED TOMOGRAPHY OF LARGE INTESTINE: Status: RESOLVED | Noted: 2017-10-10 | Resolved: 2019-10-09

## 2019-10-09 PROBLEM — K85.90 ACUTE PANCREATITIS: Status: RESOLVED | Noted: 2017-04-29 | Resolved: 2019-10-09

## 2020-04-08 PROBLEM — K55.9 ISCHEMIC COLITIS: Status: RESOLVED | Noted: 2017-10-01 | Resolved: 2020-04-08

## 2020-04-08 PROBLEM — R10.9 INTRACTABLE ABDOMINAL PAIN: Status: RESOLVED | Noted: 2018-10-03 | Resolved: 2020-04-08

## 2020-08-22 ENCOUNTER — HOSPITAL ENCOUNTER (INPATIENT)
Facility: HOSPITAL | Age: 60
LOS: 3 days | Discharge: NURSING, REHAB OR LONG-TERM CARE | DRG: 065 | End: 2020-08-27
Attending: HOSPITALIST | Admitting: HOSPITALIST
Payer: COMMERCIAL

## 2020-08-22 ENCOUNTER — HOSPITAL ENCOUNTER (EMERGENCY)
Facility: HOSPITAL | Age: 60
Discharge: ANOTHER HOSPITAL | End: 2020-08-22
Attending: SURGERY
Payer: COMMERCIAL

## 2020-08-22 VITALS
TEMPERATURE: 98 F | OXYGEN SATURATION: 97 % | DIASTOLIC BLOOD PRESSURE: 76 MMHG | WEIGHT: 203 LBS | BODY MASS INDEX: 31.79 KG/M2 | SYSTOLIC BLOOD PRESSURE: 144 MMHG | HEART RATE: 76 BPM | RESPIRATION RATE: 18 BRPM

## 2020-08-22 DIAGNOSIS — I63.9 CEREBROVASCULAR ACCIDENT (CVA), UNSPECIFIED MECHANISM: Primary | ICD-10-CM

## 2020-08-22 DIAGNOSIS — I63.9 CVA (CEREBRAL VASCULAR ACCIDENT): ICD-10-CM

## 2020-08-22 DIAGNOSIS — R53.1 RIGHT SIDED WEAKNESS: ICD-10-CM

## 2020-08-22 DIAGNOSIS — R07.9 CHEST PAIN: ICD-10-CM

## 2020-08-22 DIAGNOSIS — I63.9 STROKE: ICD-10-CM

## 2020-08-22 DIAGNOSIS — I63.9 CEREBROVASCULAR ACCIDENT (CVA) DUE TO EMBOLISM: ICD-10-CM

## 2020-08-22 LAB
ALBUMIN SERPL BCP-MCNC: 3.6 G/DL (ref 3.5–5.2)
ALP SERPL-CCNC: 90 U/L (ref 55–135)
ALT SERPL W/O P-5'-P-CCNC: 12 U/L (ref 10–44)
ANION GAP SERPL CALC-SCNC: 9 MMOL/L (ref 8–16)
APTT BLDCRRT: 31.1 SEC (ref 21–32)
AST SERPL-CCNC: 15 U/L (ref 10–40)
BASOPHILS # BLD AUTO: 0.09 K/UL (ref 0–0.2)
BASOPHILS NFR BLD: 0.8 % (ref 0–1.9)
BILIRUB SERPL-MCNC: 0.4 MG/DL (ref 0.1–1)
BNP SERPL-MCNC: 69 PG/ML (ref 0–99)
BUN SERPL-MCNC: 13 MG/DL (ref 6–20)
CALCIUM SERPL-MCNC: 8.9 MG/DL (ref 8.7–10.5)
CHLORIDE SERPL-SCNC: 106 MMOL/L (ref 95–110)
CK MB SERPL-MCNC: 2.8 NG/ML (ref 0.1–6.5)
CK MB SERPL-RTO: 2.7 % (ref 0–5)
CK SERPL-CCNC: 103 U/L (ref 20–180)
CK SERPL-CCNC: 103 U/L (ref 20–180)
CO2 SERPL-SCNC: 25 MMOL/L (ref 23–29)
CREAT SERPL-MCNC: 0.8 MG/DL (ref 0.5–1.4)
DIFFERENTIAL METHOD: NORMAL
EOSINOPHIL # BLD AUTO: 0.2 K/UL (ref 0–0.5)
EOSINOPHIL NFR BLD: 1.4 % (ref 0–8)
ERYTHROCYTE [DISTWIDTH] IN BLOOD BY AUTOMATED COUNT: 13 % (ref 11.5–14.5)
EST. GFR  (AFRICAN AMERICAN): >60 ML/MIN/1.73 M^2
EST. GFR  (NON AFRICAN AMERICAN): >60 ML/MIN/1.73 M^2
GLUCOSE SERPL-MCNC: 117 MG/DL (ref 70–110)
HCT VFR BLD AUTO: 42.5 % (ref 37–48.5)
HGB BLD-MCNC: 14.1 G/DL (ref 12–16)
IMM GRANULOCYTES # BLD AUTO: 0.02 K/UL (ref 0–0.04)
IMM GRANULOCYTES NFR BLD AUTO: 0.2 % (ref 0–0.5)
INR PPP: 1 (ref 0.8–1.2)
LYMPHOCYTES # BLD AUTO: 3.1 K/UL (ref 1–4.8)
LYMPHOCYTES NFR BLD: 28.3 % (ref 18–48)
MCH RBC QN AUTO: 30.4 PG (ref 27–31)
MCHC RBC AUTO-ENTMCNC: 33.2 G/DL (ref 32–36)
MCV RBC AUTO: 92 FL (ref 82–98)
MONOCYTES # BLD AUTO: 0.9 K/UL (ref 0.3–1)
MONOCYTES NFR BLD: 8.3 % (ref 4–15)
NEUTROPHILS # BLD AUTO: 6.6 K/UL (ref 1.8–7.7)
NEUTROPHILS NFR BLD: 61 % (ref 38–73)
NRBC BLD-RTO: 0 /100 WBC
PLATELET # BLD AUTO: 275 K/UL (ref 150–350)
PMV BLD AUTO: 10.2 FL (ref 9.2–12.9)
POTASSIUM SERPL-SCNC: 3.8 MMOL/L (ref 3.5–5.1)
PROT SERPL-MCNC: 7.4 G/DL (ref 6–8.4)
PROTHROMBIN TIME: 10.6 SEC (ref 9–12.5)
RBC # BLD AUTO: 4.64 M/UL (ref 4–5.4)
SARS-COV-2 RDRP RESP QL NAA+PROBE: NEGATIVE
SODIUM SERPL-SCNC: 140 MMOL/L (ref 136–145)
TROPONIN I SERPL DL<=0.01 NG/ML-MCNC: 0.03 NG/ML (ref 0–0.03)
WBC # BLD AUTO: 10.85 K/UL (ref 3.9–12.7)

## 2020-08-22 PROCEDURE — 36415 COLL VENOUS BLD VENIPUNCTURE: CPT

## 2020-08-22 PROCEDURE — 93010 ELECTROCARDIOGRAM REPORT: CPT | Mod: ,,, | Performed by: INTERNAL MEDICINE

## 2020-08-22 PROCEDURE — G0378 HOSPITAL OBSERVATION PER HR: HCPCS

## 2020-08-22 PROCEDURE — 25000003 PHARM REV CODE 250: Performed by: SURGERY

## 2020-08-22 PROCEDURE — 85610 PROTHROMBIN TIME: CPT

## 2020-08-22 PROCEDURE — 93010 EKG 12-LEAD: ICD-10-PCS | Mod: ,,, | Performed by: INTERNAL MEDICINE

## 2020-08-22 PROCEDURE — U0002 COVID-19 LAB TEST NON-CDC: HCPCS

## 2020-08-22 PROCEDURE — 85025 COMPLETE CBC W/AUTO DIFF WBC: CPT

## 2020-08-22 PROCEDURE — 82553 CREATINE MB FRACTION: CPT

## 2020-08-22 PROCEDURE — 82550 ASSAY OF CK (CPK): CPT

## 2020-08-22 PROCEDURE — 84484 ASSAY OF TROPONIN QUANT: CPT

## 2020-08-22 PROCEDURE — G0379 DIRECT REFER HOSPITAL OBSERV: HCPCS

## 2020-08-22 PROCEDURE — 99285 EMERGENCY DEPT VISIT HI MDM: CPT | Mod: 25

## 2020-08-22 PROCEDURE — 80053 COMPREHEN METABOLIC PANEL: CPT

## 2020-08-22 PROCEDURE — 85730 THROMBOPLASTIN TIME PARTIAL: CPT

## 2020-08-22 PROCEDURE — 83880 ASSAY OF NATRIURETIC PEPTIDE: CPT

## 2020-08-22 PROCEDURE — 93005 ELECTROCARDIOGRAM TRACING: CPT

## 2020-08-22 RX ORDER — ASPIRIN 325 MG
325 TABLET ORAL
Status: COMPLETED | OUTPATIENT
Start: 2020-08-22 | End: 2020-08-22

## 2020-08-22 RX ADMIN — ASPIRIN 325 MG ORAL TABLET 325 MG: 325 PILL ORAL at 06:08

## 2020-08-22 NOTE — ED PROVIDER NOTES
[facility]                                                 Chief Complaint  59 y.o. female with Weakness    History of Present Illness  Rosalia Fagan presents to the emergency room with right-sided weakness  Patient right-sided weakness/2-3 days, seen at [facility] yesterday evening PTA  Patient was noted to have right-sided weakness at that time, probable stroke  Patient be transfer for MRI and Neurology evaluation from [facility] yesterday  Patient refused transfer and signed out against medical advice, no CVA history  Pt again on exam today as right-sided weakness suggested for probable CVA    The history is provided by the patient   device was not used during this ER visit    Past Medical History   -- Adrenal adenoma, left    -- Chronic anxiety    -- Chronic back pain    -- Closed L3 vertebral fracture    -- COPD (chronic obstructive pulmonary disease)    -- Degenerative joint disease (DJD) of lumbar spine    -- Diverticulosis of colon    -- DJD (degenerative joint disease)    -- Elevated liver enzymes    -- Encounter for blood transfusion    -- Fibroids, intramural    -- GERD (gastroesophageal reflux disease)    -- History of shingles    -- Hyperlipemia    -- Hypertension    -- Ischemic colitis    -- Lumbar post-laminectomy syndrome    -- Menopausal and female climacteric states    -- Muscle contraction headache syndrome    -- Pancreatitis    -- PTSD (post-traumatic stress disorder)    -- Spinal stenosis of lumbar region at multiple levels    -- Varicose veins of both lower extremities with inflammation    -- Vitamin D deficiency      Past Surgical History   -- APPENDECTOMY     -- BREAST BIOPSY     -- CHOLECYSTECTOMY     -- COLONOSCOPY     -- COLONOSCOPY     -- CORONARY ANGIOPLASTY     -- EPIDURAL STEROID INJECTION INTO LUMBAR SPINE     -- ESOPHAGOGASTRODUODENOSCOPY     -- LUMBAR LAMINECTOMY WITH FUSION     -- TOTAL ABDOMINAL HYSTERECTOM        No Known Allergies     I  have reviewed all of this patient's past medical, surgical, family, and social   histories as well as active allergies and medications documented in the  electronic medical record    Review of Systems and Physical Exam      Review of Systems  -- Constitution - no fever, denies fatigue, no weakness, no chills  -- Eyes - no tearing or redness, no visual disturbance  -- Ear, Nose - no tinnitus or earache, no nasal congestion or discharge  -- Mouth,Throat - no sore throat, no toothache, normal voice, normal swallowing  -- Respiratory - denies cough and congestion, no shortness of breath, no [place]  -- Cardiovascular - denies chest pain, no palpitations, denies claudication  -- Gastrointestinal - denies abdominal pain, nausea, vomiting, or diarrhea  -- Genitourinary - no dysuria, denies flank pain, no hematuria, no STD risk  -- Musculoskeletal - denies back pain, negative for trauma or injury  -- Neurological - right-sided weakness since Friday/3 days  -- Skin - denies pallor, rash, or changes in skin. no hives or welts noted  -- Psychiatric - Denies SI or HI, no psychosis or fractured thought noted     Vital Signs  Temporal temperature is 97.3 °F (36.3 °C).   Her blood pressure is 124/72 and her pulse is 94.   Her respiration is 18 and oxygen saturation is 98%.     Physical Exam  -- Nursing note and vitals reviewed  -- Constitutional: Appears well-developed and well-nourished  -- Head: Atraumatic. Normocephalic. No obvious abnormality  -- Eyes: Pupils are equal and reactive to light. Normal conjunctiva and lids  -- Cardiac: Normal rate, regular rhythm and normal heart sounds  -- Pulmonary: Normal respiratory effort, breath sounds clear to auscultation  -- Abdominal: Soft, no tenderness. Normal bowel sounds. Normal liver edge  -- Musculoskeletal: Normal range of motion, no effusions. Joints stable   -- Neurological:  4 of 5 strength in the right lower and upper extremity, facial droop  -- Vascular: Posterior tibial,  dorsalis pedis and radial pulses 2+ bilaterally    -- Lymphatics: No cervical or peripheral lymphadenopathy. No edema noted    Emergency Room Course      Lab Results     K 3.8      CO2 25   BUN 13   CREATININE 0.8    (H)   ALKPHOS 90   AST 15   ALT 12   BILITOT 0.4   ALBUMIN 3.6   PROT 7.4   WBC 10.85   HGB 14.1   HCT 42.5            CPKMB 2.8   TROPONINI 0.030 (H)   INR 1.0   BNP 69   TSH 0.912     EKG  -- The EKG findings today were without concerning findings from baseline     Radiology  -- The CT of the head performed in the ER today was negative for acute pathology   -- Chest x-ray showed no infiltrate and showed no acute pathology     Additional Work up  -- rapid Coronavirus PCR was negative    Medications Given  aspirin tablet 325 mg (has no administration in time range)     ED Physician Management  -- Diagnosis management comments: 59 y.o. female with right-sided weakness  -- patient with right-sided weakness with a negative CT today, suspicious for CVA  -- patient needs evaluation with MRI and Neurology consult, do not have services  -- patient be transferred via the are see to a suitable facility for evaluation today    Critical Care ED Physician Time (minutes):  -- Performed by: Maycol Small M.D.  -- Date/Time: 6:13 PM 8/22/2020   -- Direct Patient Care (Face Time): 5  -- Additional History from Records or Taking Additional History: 0  -- Ordering, Reviewing, and Interpreting Diagnostic Studies: 5  -- Total Time in Documentation: 5  -- Consultation with Other Physicians: 5  -- Consultation with Family Related to Condition: 0  -- Total Critical Care Time: 20     Diagnosis  [R53.1] Right sided weakness  [I63.9] Cerebrovascular accident (CVA), unspecified mechanism (Primary)    Disposition and Plan  -- Disposition: transfer  -- Condition: stable    This note is dictated on M*Modal word recognition program.  There are word recognition mistakes that are occasionally  missed on review.         Maycol Small MD  08/22/20 9812

## 2020-08-22 NOTE — ED TRIAGE NOTES
Pt was seen at [facility] ER yesterday for right arm weakness. Pt was worked up and ruled to have possible stroke. Pt present to ER today stating my daughter told me to come back because she felt I was not acting right. Pt noted to have right side facial droop at this time. Pt left hand  stronger than right. Pt denies numbness at this time.

## 2020-08-23 PROBLEM — I63.9 STROKE: Status: ACTIVE | Noted: 2020-08-23

## 2020-08-23 LAB
ALBUMIN SERPL BCP-MCNC: 3.6 G/DL (ref 3.5–5.2)
ALP SERPL-CCNC: 88 U/L (ref 55–135)
ALT SERPL W/O P-5'-P-CCNC: 10 U/L (ref 10–44)
ANION GAP SERPL CALC-SCNC: 8 MMOL/L (ref 8–16)
AST SERPL-CCNC: 15 U/L (ref 10–40)
BACTERIA #/AREA URNS HPF: ABNORMAL /HPF
BASOPHILS # BLD AUTO: 0.07 K/UL (ref 0–0.2)
BASOPHILS NFR BLD: 0.7 % (ref 0–1.9)
BILIRUB SERPL-MCNC: 0.5 MG/DL (ref 0.1–1)
BILIRUB UR QL STRIP: NEGATIVE
BUN SERPL-MCNC: 12 MG/DL (ref 6–20)
CALCIUM SERPL-MCNC: 9.1 MG/DL (ref 8.7–10.5)
CHLORIDE SERPL-SCNC: 107 MMOL/L (ref 95–110)
CLARITY UR: CLEAR
CO2 SERPL-SCNC: 23 MMOL/L (ref 23–29)
COLOR UR: YELLOW
CREAT SERPL-MCNC: 0.7 MG/DL (ref 0.5–1.4)
DIFFERENTIAL METHOD: NORMAL
EOSINOPHIL # BLD AUTO: 0.2 K/UL (ref 0–0.5)
EOSINOPHIL NFR BLD: 1.9 % (ref 0–8)
ERYTHROCYTE [DISTWIDTH] IN BLOOD BY AUTOMATED COUNT: 12.8 % (ref 11.5–14.5)
EST. GFR  (AFRICAN AMERICAN): >60 ML/MIN/1.73 M^2
EST. GFR  (NON AFRICAN AMERICAN): >60 ML/MIN/1.73 M^2
ESTIMATED AVG GLUCOSE: 105 MG/DL (ref 68–131)
GLUCOSE SERPL-MCNC: 95 MG/DL (ref 70–110)
GLUCOSE UR QL STRIP: NEGATIVE
HBA1C MFR BLD HPLC: 5.3 % (ref 4–5.6)
HCT VFR BLD AUTO: 42.3 % (ref 37–48.5)
HGB BLD-MCNC: 13.8 G/DL (ref 12–16)
HGB UR QL STRIP: ABNORMAL
IMM GRANULOCYTES # BLD AUTO: 0.02 K/UL (ref 0–0.04)
IMM GRANULOCYTES NFR BLD AUTO: 0.2 % (ref 0–0.5)
KETONES UR QL STRIP: NEGATIVE
LEUKOCYTE ESTERASE UR QL STRIP: NEGATIVE
LYMPHOCYTES # BLD AUTO: 3.2 K/UL (ref 1–4.8)
LYMPHOCYTES NFR BLD: 32.7 % (ref 18–48)
MAGNESIUM SERPL-MCNC: 1.9 MG/DL (ref 1.6–2.6)
MCH RBC QN AUTO: 30.1 PG (ref 27–31)
MCHC RBC AUTO-ENTMCNC: 32.6 G/DL (ref 32–36)
MCV RBC AUTO: 92 FL (ref 82–98)
MICROSCOPIC COMMENT: ABNORMAL
MONOCYTES # BLD AUTO: 0.7 K/UL (ref 0.3–1)
MONOCYTES NFR BLD: 7.6 % (ref 4–15)
NEUTROPHILS # BLD AUTO: 5.5 K/UL (ref 1.8–7.7)
NEUTROPHILS NFR BLD: 56.9 % (ref 38–73)
NITRITE UR QL STRIP: NEGATIVE
NRBC BLD-RTO: 0 /100 WBC
PH UR STRIP: 6 [PH] (ref 5–8)
PHOSPHATE SERPL-MCNC: 3.3 MG/DL (ref 2.7–4.5)
PLATELET # BLD AUTO: 249 K/UL (ref 150–350)
PMV BLD AUTO: 10.5 FL (ref 9.2–12.9)
POTASSIUM SERPL-SCNC: 3.8 MMOL/L (ref 3.5–5.1)
PROT SERPL-MCNC: 7.1 G/DL (ref 6–8.4)
PROT UR QL STRIP: NEGATIVE
RBC # BLD AUTO: 4.59 M/UL (ref 4–5.4)
RBC #/AREA URNS HPF: 5 /HPF (ref 0–4)
SODIUM SERPL-SCNC: 138 MMOL/L (ref 136–145)
SP GR UR STRIP: 1.02 (ref 1–1.03)
SQUAMOUS #/AREA URNS HPF: 3 /HPF
TROPONIN I SERPL DL<=0.01 NG/ML-MCNC: 0.03 NG/ML (ref 0–0.03)
URN SPEC COLLECT METH UR: ABNORMAL
UROBILINOGEN UR STRIP-ACNC: NEGATIVE EU/DL
WBC # BLD AUTO: 9.69 K/UL (ref 3.9–12.7)
WBC #/AREA URNS HPF: 2 /HPF (ref 0–5)

## 2020-08-23 PROCEDURE — G0378 HOSPITAL OBSERVATION PER HR: HCPCS

## 2020-08-23 PROCEDURE — 97161 PT EVAL LOW COMPLEX 20 MIN: CPT

## 2020-08-23 PROCEDURE — 97530 THERAPEUTIC ACTIVITIES: CPT

## 2020-08-23 PROCEDURE — 92610 EVALUATE SWALLOWING FUNCTION: CPT

## 2020-08-23 PROCEDURE — 25000003 PHARM REV CODE 250: Performed by: NURSE PRACTITIONER

## 2020-08-23 PROCEDURE — 36415 COLL VENOUS BLD VENIPUNCTURE: CPT

## 2020-08-23 PROCEDURE — 85025 COMPLETE CBC W/AUTO DIFF WBC: CPT

## 2020-08-23 PROCEDURE — 83036 HEMOGLOBIN GLYCOSYLATED A1C: CPT

## 2020-08-23 PROCEDURE — 25500020 PHARM REV CODE 255: Performed by: HOSPITALIST

## 2020-08-23 PROCEDURE — 80053 COMPREHEN METABOLIC PANEL: CPT

## 2020-08-23 PROCEDURE — 94761 N-INVAS EAR/PLS OXIMETRY MLT: CPT

## 2020-08-23 PROCEDURE — 25000003 PHARM REV CODE 250: Performed by: HOSPITALIST

## 2020-08-23 PROCEDURE — 84484 ASSAY OF TROPONIN QUANT: CPT

## 2020-08-23 PROCEDURE — 92523 SPEECH SOUND LANG COMPREHEN: CPT

## 2020-08-23 PROCEDURE — 97802 MEDICAL NUTRITION INDIV IN: CPT

## 2020-08-23 PROCEDURE — 83735 ASSAY OF MAGNESIUM: CPT

## 2020-08-23 PROCEDURE — 81000 URINALYSIS NONAUTO W/SCOPE: CPT

## 2020-08-23 PROCEDURE — 84100 ASSAY OF PHOSPHORUS: CPT

## 2020-08-23 PROCEDURE — 97165 OT EVAL LOW COMPLEX 30 MIN: CPT

## 2020-08-23 RX ORDER — CLOPIDOGREL BISULFATE 75 MG/1
75 TABLET ORAL DAILY
Status: DISCONTINUED | OUTPATIENT
Start: 2020-08-24 | End: 2020-08-27 | Stop reason: HOSPADM

## 2020-08-23 RX ORDER — AMLODIPINE BESYLATE 5 MG/1
10 TABLET ORAL DAILY
Status: DISCONTINUED | OUTPATIENT
Start: 2020-08-23 | End: 2020-08-27 | Stop reason: HOSPADM

## 2020-08-23 RX ORDER — CLOPIDOGREL BISULFATE 75 MG/1
75 TABLET ORAL ONCE
Status: COMPLETED | OUTPATIENT
Start: 2020-08-23 | End: 2020-08-23

## 2020-08-23 RX ORDER — ONDANSETRON 8 MG/1
8 TABLET, ORALLY DISINTEGRATING ORAL EVERY 8 HOURS PRN
Status: DISCONTINUED | OUTPATIENT
Start: 2020-08-23 | End: 2020-08-27 | Stop reason: HOSPADM

## 2020-08-23 RX ORDER — ATORVASTATIN CALCIUM 40 MG/1
40 TABLET, FILM COATED ORAL DAILY
Status: DISCONTINUED | OUTPATIENT
Start: 2020-08-23 | End: 2020-08-27 | Stop reason: HOSPADM

## 2020-08-23 RX ORDER — SODIUM CHLORIDE 0.9 % (FLUSH) 0.9 %
10 SYRINGE (ML) INJECTION
Status: DISCONTINUED | OUTPATIENT
Start: 2020-08-23 | End: 2020-08-27 | Stop reason: HOSPADM

## 2020-08-23 RX ORDER — ASPIRIN 81 MG/1
81 TABLET ORAL DAILY
Status: DISCONTINUED | OUTPATIENT
Start: 2020-08-23 | End: 2020-08-27 | Stop reason: HOSPADM

## 2020-08-23 RX ADMIN — AMLODIPINE BESYLATE 10 MG: 5 TABLET ORAL at 08:08

## 2020-08-23 RX ADMIN — ATORVASTATIN CALCIUM 40 MG: 40 TABLET, FILM COATED ORAL at 08:08

## 2020-08-23 RX ADMIN — CLOPIDOGREL 75 MG: 75 TABLET, FILM COATED ORAL at 10:08

## 2020-08-23 RX ADMIN — IOHEXOL 100 ML: 350 INJECTION, SOLUTION INTRAVENOUS at 05:08

## 2020-08-23 RX ADMIN — ASPIRIN 81 MG: 81 TABLET, COATED ORAL at 08:08

## 2020-08-23 NOTE — PLAN OF CARE
Informed by bedside nurse Nikhil that patient is refusing the VN assessment at this time due to the patient wanting to rest.  VN will continue to be available as needed and when patient willing to participate in admit assessment

## 2020-08-23 NOTE — ASSESSMENT & PLAN NOTE
Presents with aphasia and right side weakness 3/5  - now improving    Consult neurology   CT head nonacute  MRI of brain: Several foci of diffusion involving the left cerebral hemisphere (frontal, parietal, temporal lobes and left basal ganglia) in keeping with recent small infarcts, potentially embolic in nature.  CTA head and neck: Occlusion of the left proximal internal carotid artery with collateral filling/reconstitution intracranially within the cavernous carotid portion.  Carotid US: occluded left ICA  Echo with bubble: patent PFO with + Matias's sign  Atorvastatin 40mg daily  ASA 81mg daily and plavix 75mg daily x3 months at least  - TSH wnl, EKG NSR, A1c 5.3, , B12 wnl  - PT/OT/SLP: recommend IPR

## 2020-08-23 NOTE — PLAN OF CARE
Pt transferred from [facility] s/p CVA    Pt reports she lives alone and had been independent with all adls including driving. Pt's son Moose at bedside and stated he and his siblings can provide help at home as needed and transportation upon d/c.    Dr. Garcia came in room and updated pt and son on POC and possibility of transfer to [facility] pending test results.     TN gave d/c brochure and folder and encouraged pt/son to call for any needs/concerns.       08/23/20 6732   Discharge Assessment   Assessment Type Discharge Planning Assessment   Confirmed/corrected address and phone number on facesheet? Yes   Assessment information obtained from? Patient   Expected Length of Stay (days) 2   Communicated expected length of stay with patient/caregiver yes   Prior to hospitilization cognitive status: Alert/Oriented   Prior to hospitalization functional status: Independent   Current cognitive status: Alert/Oriented   Current Functional Status: Needs Assistance   Facility Arrived From: transfer from [facility]   Lives With alone   Able to Return to Prior Arrangements yes   Is patient able to care for self after discharge? Unable to determine at this time (comments)   Who are your caregiver(s) and their phone number(s)? Moose (son) [phone]   Patient's perception of discharge disposition home health   Readmission Within the Last 30 Days no previous admission in last 30 days   Patient currently being followed by outpatient case management? No   Patient currently receives any other outside agency services? No   Equipment Currently Used at Home none   Do you have any problems affording any of your prescribed medications? No   Is the patient taking medications as prescribed? yes   Does the patient have transportation home? Yes   Transportation Anticipated family or friend will provide   Does the patient receive services at the [facility]? No   Discharge Plan A Home Health   Discharge Plan B Rehab   DME Needed  Upon Discharge  other (see comments)  (tbd)   Patient/Family in Agreement with Plan yes

## 2020-08-23 NOTE — PLAN OF CARE
Received called from Dr. Cardenas , radiologist. He currently looking at patient's US Carotid. It shows her left internal carotid occulted. Dr. Mar notified.

## 2020-08-23 NOTE — SUBJECTIVE & OBJECTIVE
Past Medical History:   Diagnosis Date    Adrenal adenoma, left 05/2017    Chronic anxiety     Chronic back pain     Closed L3 vertebral fracture 12/2017    fell 2 weeks after back surgery     COPD (chronic obstructive pulmonary disease)     Degenerative joint disease (DJD) of lumbar spine     Diverticulosis of colon     DJD (degenerative joint disease)     Elevated liver enzymes     Encounter for blood transfusion     Fibroids, intramural 02/2016    GERD (gastroesophageal reflux disease)     History of shingles 02/2016    Right flank    Hyperlipemia     Hypertension     Ischemic colitis 10/2017    Lumbar post-laminectomy syndrome     Menopausal and female climacteric states     Muscle contraction headache syndrome     Pancreatitis 04/2017    PTSD (post-traumatic stress disorder)     daughter age 7 killed in the driveway hit by a drunk     Spinal stenosis of lumbar region at multiple levels     Varicose veins of both lower extremities with inflammation     Vitamin D deficiency        Past Surgical History:   Procedure Laterality Date    APPENDECTOMY  1992    BREAST BIOPSY Right 1992    [place] [facility]    [facility]  [facility]    COLONOSCOPY N/A 10/10/2017    Procedure: COLONOSCOPY;  Surgeon: Mike Narvaez MD;  Location: [facility] ENDO;  Service: Endoscopy;  Laterality: N/A;    COLONOSCOPY  10/05/2018    CORONARY ANGIOPLASTY  03/31/2016    EPIDURAL STEROID INJECTION INTO LUMBAR SPINE  11/2014    [facility] Agee    ESOPHAGOGASTRODUODENOSCOPY N/A 10/5/2018    Procedure: EGD (ESOPHAGOGASTRODUODENOSCOPY);  Surgeon: Mike Narvaez MD;  Location: [facility];  Service: Endoscopy;  Laterality: N/A;    LUMBAR LAMINECTOMY WITH FUSION  11/2017    TOTAL ABDOMINAL HYSTERECTOMY W/ BILATERAL SALPINGOOPHORECTOMY  02/2016       Review of patient's allergies indicates:  No Known Allergies    Current Facility-Administered Medications on File Prior to Encounter   Medication    [COMPLETED]  "aspirin tablet 325 mg     Current Outpatient Medications on File Prior to Encounter   Medication Sig    ALPRAZolam (XANAX) 0.5 MG tablet TAKE 1 TABLET BY MOUTH THREE TIMES A DAY AS NEEDED ANXIETY    amlodipine-benazepril 10-20mg (LOTREL) 10-20 mg per capsule TAKE 1 CAPSULE BY MOUTH EVERY DAY    cyanocobalamin (VITAMIN B-12) 1000 MCG tablet Take 1,000 mcg by mouth once daily.    cyclobenzaprine (FLEXERIL) 10 MG tablet TAKE 1 TABLET BY MOUTH THREE TIMES A DAY AS NEEDED FOR MUSCLE SPASMS    estradiol 0.05 mg/24 hr td ptsw (VIVELLE-DOT) 0.05 mg/24 hr Place 1 patch onto the skin twice a week.    gabapentin (NEURONTIN) 300 MG capsule TAKE 2 CAPSULES (600 MG TOTAL) BY MOUTH 3 (THREE) TIMES DAILY.    meloxicam (MOBIC) 15 MG tablet Take 1 tablet (15 mg total) by mouth once daily. Take with food    multivitamin capsule Take 1 capsule by mouth once daily.    pantoprazole (PROTONIX) 40 MG tablet Take 1 tablet (40 mg total) by mouth once daily.    spironolactone (ALDACTONE) 100 MG tablet Take 1 tablet (100 mg total) by mouth once daily.     Family History     Problem Relation (Age of Onset)    Anxiety disorder Mother    Arthritis Mother    Bladder Cancer Father    Breast cancer Sister    Cancer Maternal Aunt    Hyperlipidemia Mother    Hypertension Mother    Kidney cancer Brother        Tobacco Use    Smoking status: Light Tobacco Smoker     Packs/day: 0.50     Types: Cigarettes, Cigars     Last attempt to quit: 4/29/2017     Years since quitting: 3.3    Smokeless tobacco: Never Used   Substance and Sexual Activity    Alcohol use: No     Frequency: Never     Comment: "OCASSIONALLY"    Drug use: No    Sexual activity: Yes     Partners: Male     Review of Systems   Unable to perform ROS: Other (aphasia)   Neurological: Negative for numbness.     Objective:     Vital Signs (Most Recent):  Temp: 98 °F (36.7 °C) (08/22/20 2358)  Pulse: 67 (08/23/20 0003)  Resp: 18 (08/22/20 2358)  BP: (!) 143/84 (08/22/20 2358)  SpO2: " 98 % (08/22/20 2358) Vital Signs (24h Range):  Temp:  [97.3 °F (36.3 °C)-98 °F (36.7 °C)] 98 °F (36.7 °C)  Pulse:  [66-94] 67  Resp:  [18] 18  SpO2:  [95 %-98 %] 98 %  BP: (124-155)/(72-89) 143/84     Weight: 94.1 kg (207 lb 7.3 oz)  Body mass index is 32.49 kg/m².    Physical Exam  Vitals signs and nursing note reviewed.   Constitutional:       General: She is not in acute distress.     Appearance: She is obese.   HENT:      Head: Normocephalic and atraumatic.   Eyes:      Pupils: Pupils are equal, round, and reactive to light.   Neck:      Musculoskeletal: Normal range of motion.   Cardiovascular:      Rate and Rhythm: Normal rate and regular rhythm.      Pulses: Normal pulses.      Heart sounds: Normal heart sounds.   Pulmonary:      Effort: Pulmonary effort is normal.      Breath sounds: Normal breath sounds.   Abdominal:      General: Bowel sounds are normal.   Musculoskeletal:         General: No swelling.   Skin:     General: Skin is warm and dry.   Neurological:      Mental Status: She is alert.      Comments: Right side weakness, 3/5 strength, aphasia   Psychiatric:      Comments: tearful           CRANIAL NERVES     CN III, IV, VI   Pupils are equal, round, and reactive to light.       Significant Labs:   A1C: No results for input(s): HGBA1C in the last 4320 hours.  CBC:   Recent Labs   Lab 08/21/20  1308 08/22/20  1643   WBC 10.46 10.85   HGB 13.4 14.1   HCT 41.7 42.5    275     CMP:   Recent Labs   Lab 08/21/20  1308 08/22/20  1643    140   K 4.2 3.8    106   CO2 25 25   GLU 90 117*   BUN 14 13   CREATININE 0.8 0.8   CALCIUM 8.9 8.9   PROT 7.3 7.4   ALBUMIN 3.5 3.6   BILITOT 0.2 0.4   ALKPHOS 88 90   AST 16 15   ALT 12 12   ANIONGAP 9 9   EGFRNONAA >60.0 >60     Cardiac Markers:   Recent Labs   Lab 08/22/20  1643   BNP 69     Coagulation:   Recent Labs   Lab 08/22/20  1643   INR 1.0   APTT 31.1     Lipid Panel:   Recent Labs   Lab 08/21/20  1308   CHOL 185   HDL 43   LDLCALC 100.6    TRIG 207*   CHOLHDL 23.2     Magnesium: No results for input(s): MG in the last 48 hours.  POCT Glucose:   Recent Labs   Lab 08/21/20  1157   POCTGLUCOSE 89     Troponin:   Recent Labs   Lab 08/22/20  1643   TROPONINI 0.030*     TSH:   Recent Labs   Lab 08/21/20  1308   TSH 0.912       Significant Imaging: I have reviewed all pertinent imaging results/findings within the past 24 hours.

## 2020-08-23 NOTE — ASSESSMENT & PLAN NOTE
on arrival  Patient home meds: amplodipine/benazapril, aldactone  Patient unsure of medications    Will give amlodipine 10mg for now

## 2020-08-23 NOTE — ASSESSMENT & PLAN NOTE
on arrival  Patient home meds: amplodipine/benazapril, aldactone; resume  - outside of permissive HTN window

## 2020-08-23 NOTE — ASSESSMENT & PLAN NOTE
"The patient presents with aphasia and right side weakness 3/5  She is tearful but not able to say much, slowed response but states "I don't know" when asked why she is here    Consult neurology   MRI of brain  MRA head and neck  Carotid US  Echo with bubble  Atorvastatin 40mg daily  ASA 81mg daily    "

## 2020-08-23 NOTE — PT/OT/SLP EVAL
Physical Therapy Evaluation    Patient Name:  Rosalia Fagan   MRN:  [id number]    Recommendations:     Discharge Recommendations:  rehabilitation facility   Discharge Equipment Recommendations: (Defer to IPR)   Barriers to discharge: Decreased caregiver support    Assessment:     Rosalia Fagan is a 59 y.o. female admitted with a medical diagnosis of CVA (cerebral vascular accident).  She presents with the following impairments/functional limitations:  weakness, impaired endurance, impaired sensation, impaired self care skills, impaired functional mobilty, gait instability, impaired balance, impaired cognition, decreased coordination, decreased upper extremity function, decreased lower extremity function. Prior to admission, patient was living in community setting functioning at an independent level for ADLs, and mobility prior to this event.  There is an expectation of returning to prior level of function to maintain independence thus avoiding readmission.  Patient's clinical condition meets full Inpatient Rehab (IPR) criteria; including the ability to actively participate in 3 hours of therapy.  A lower level of care(SNF) cannot provide the interdisciplinary treatment approach needed.  . Pt required demonstration and repeated instructions to complete testing. At this time pt would be unable to complete daily task and return to work without intensive therapy, therefore recommendation of IPR would provided the pt with best opportunity to return to PLOF.     Rehab Prognosis: Good; patient would benefit from acute skilled PT services to address these deficits and reach maximum level of function.    Recent Surgery: * No surgery found *      Plan:     During this hospitalization, patient to be seen 6 x/week to address the identified rehab impairments via gait training, therapeutic activities, therapeutic exercises, therapeutic groups and progress toward the following goals:    · Plan of Care Expires:   09/23/20    Subjective     Chief Complaint: Pt upset regarding current functional mobility level and is fearful about returning to work in her current condition.   Patient/Family Comments/goals: Return to PLOF.   Pain/Comfort:  · Pain Rating 1: 0/10  · Pain Rating Post-Intervention 1: 0/10  · Pain Rating Post-Intervention 2: 0/10    Patients cultural, spiritual, Buddhism conflicts given the current situation:      Living Environment:  Pt lives alone in a trailer with 4 JARED and BHR. Pt has a tub/shower combo. PTA pt was independent with all ADLs, drove, completed house hold task and worked as a  at [employer]   Prior to admission, patients level of function was independent.  Equipment used at home: none.  DME owned (not currently used): none.      Objective:     Communicated with RN prior to session.  Patient found HOB elevated with bed alarm  upon PT entry to room.    General Precautions: Standard, fall   Orthopedic Precautions:    Braces:       Exams:  · Cognitive Exam:  Patient is oriented to Person, Place, Time and Situation  · Fine Motor Coordination:    · -       Impaired    · UE: See OT note for details.  · LE: impaired heel to shin.    Gross Motor Coordination:  impaired  · Sensation:    · -       Intact  · LEs ROM: WFL  · Strength: RLE: grossly 4/5, LLE: Grossly 4+/5    Functional Mobility:  · Bed Mobility:     · Rolling Left:  contact guard assistance  · Scooting: contact guard assistance  · Supine to Sit: contact guard assistance  · Sit to Supine: contact guard assistance  · Transfers:     · Sit to Stand:  contact guard assistance with no AD  · Gait: 150 feet, see details below.   · Balance: fair    Therapeutic Activities and Exercises:  · Bed mobility and transfers as listed above.   · Pt ambulated ~150 feet without AD and CGA. Pt ambulates with decreased gait speed, wide NILSA, increased lateral sway and high guard.   · Pt was educated on role of PT, POC and the importance of safety including  calling for nursing for assistance with OOB mobility.     Patient left seated in bedside chair with all lines intact, call button in reach, chair alarm on, RN notified and pt's son present.     AM-PAC 6 CLICK MOBILITY  Total Score:24     GOALS:   Multidisciplinary Problems     Physical Therapy Goals        Problem: Physical Therapy Goal    Goal Priority Disciplines Outcome Goal Variances Interventions   Physical Therapy Goal     PT, PT/OT Ongoing, Progressing     Description: Goals to be met by: 2020    Patient will increase functional independence with mobility by performin) Supine<>Sit with independence   2) Sit <>Stand with independence  3) Bed <>Chair with independence   4) Pt to ambulate 500 feet with independence   5) Pt to perform BLE X10 mod I with handout.                            History:     Past Medical History:   Diagnosis Date    Adrenal adenoma, left 2017    Chronic anxiety     Chronic back pain     Closed L3 vertebral fracture 2017    fell 2 weeks after back surgery     COPD (chronic obstructive pulmonary disease)     Degenerative joint disease (DJD) of lumbar spine     Diverticulosis of colon     DJD (degenerative joint disease)     Elevated liver enzymes     Encounter for blood transfusion     Fibroids, intramural 2016    GERD (gastroesophageal reflux disease)     History of shingles 2016    Right flank    Hyperlipemia     Hypertension     Ischemic colitis 10/2017    Lumbar post-laminectomy syndrome     Menopausal and female climacteric states     Muscle contraction headache syndrome     Pancreatitis 2017    PTSD (post-traumatic stress disorder)     daughter age 7 killed in the driveway hit by a drunk     Spinal stenosis of lumbar region at multiple levels     Varicose veins of both lower extremities with inflammation     Vitamin D deficiency        Past Surgical History:   Procedure Laterality Date    APPENDECTOMY      [place] [facility]    [place]    [facility] [place]    COLONOSCOPY N/A 10/10/2017    Procedure: COLONOSCOPY;  Surgeon: Mike Narvaez MD;  Location: [facility];  Service: Endoscopy;  Laterality: N/A;    COLONOSCOPY  10/05/2018    CORONARY ANGIOPLASTY  03/31/2016    EPIDURAL STEROID INJECTION INTO LUMBAR SPINE  11/2014    [facility]e    ESOPHAGOGASTRODUODENOSCOPY N/A 10/5/2018    Procedure: EGD (ESOPHAGOGASTRODUODENOSCOPY);  Surgeon: Mike Narvaez MD;  Location: [facility];  Service: Endoscopy;  Laterality: N/A;    LUMBAR LAMINECTOMY WITH FUSION  11/2017    TOTAL ABDOMINAL HYSTERECTOMY W/ BILATERAL SALPINGOOPHORECTOMY  02/2016       Time Tracking:     PT Received On: 08/23/20  PT Start Time: 1057     PT Stop Time: 1120  PT Total Time (min): 23 min     Billable Minutes: Evaluation 13 and Therapeutic Activity 10,       Francesco Kasper, PT, DPT  08/23/2020

## 2020-08-23 NOTE — ED NOTES
Pt departing to [facility] via [facility] unit 33.  Belongings sent with pt.   No change in neurological status since arrival to ED.  AAO x 4, RR even/unlabored, VSS, NAD noted. Verbalizes no needs at this time.   Report given to receiving nurse and transport team.

## 2020-08-23 NOTE — PLAN OF CARE
Assessed patient at the bedside. Neuro check was completed. Patient is on room air. Patient has right facial droop and right weakness. Also reviewed plan of care with patient's son Sunil  via phone at the bedside with patient's permission. Patient set password for MONIE and given to son at this time. Patient does not have any complaints or discomfort. Will continue to monitor patient.

## 2020-08-23 NOTE — SIGNIFICANT EVENT
Carotid US with occluded left ICA. Will obtain CTA to better characterize. Transfer to [facility] if intervention possible; otherwise will complete stroke w/u and plan on IPR.

## 2020-08-23 NOTE — PLAN OF CARE
Problem: SLP Goal  Goal: SLP Goal  Description: Short Term Goals:  1. Pt will participate in BSS to determine least restrictive diet. - MET 8/23/20  2. Pt will tolerate regular textures/thin liquids PO diet with no overt s/s of aspiration.  3. Pt will participate in informal speech-lang eval to r/o cognitive-linguistic deficits. - MET 8/23/20  4. Pt will answer complex Y/N questions with 80% acc ind'ly  5. Pt will follow 2+ step commands with 80% acc ind'ly  6. Pt will complete word retrieval tasks with 80% acc with min cuing  7. Pt will complete higher level mental manipulation tasks (functional problem solving, reasoning, memory, sequencing, etc). with 80% acc with min-mod asst.   8. Pt will participate in OMEs x10 to improve oral motor function/strength   *further goals pending pt's progress*    Outcome: Ongoing, Progressing   8/23/20:  Pt participated in clinical swallow eval and informal speech-language-cognition eval this AM. Pt tolerated thin liquids, puree, and hard solid textures with no overt s/s of aspiration, at bedside. Pt with limited cooperation/participation in SLC eval; however, appears to demonstrate mild language and cognitive deficits. See full note for details.  SLP recs: Upgrade to Regular textures/Thin liquids PO diet with standard swallow precautions. SLP will continue to follow for language and cognitive deficits. SLP notified MD Mar and CEE Cutler of results/recs -- both acknowledged.     JOSE ALBERTO Archibald., CCC-SLP  Speech-Language Pathologist

## 2020-08-23 NOTE — ED NOTES
Pt to be transferred to [facility] room 458, accepting: Dr. Mar.     Pt aware of pending transfer and v/u.   Risks and benefits explained and pt agrees with plan of care.   Witnessed signing of transfer consent by pt.   Awaiting [facility] arrival.

## 2020-08-23 NOTE — PLAN OF CARE
Problem: Occupational Therapy Goal  Goal: Occupational Therapy Goal  Description: Goals to be met by: 09/23/2020     Patient will increase functional independence with ADLs by performing:    Feeding with Modified [place].  UE Dressing with Modified [place].  LE Dressing with Modified [place].  Grooming while standing at sink with Modified [place].  Toileting from toilet with Modified [place] for hygiene and clothing management.   Toilet transfer to toilet with Modified [place].    Outcome: Ongoing, Progressing    Pt was agreeable to OT/PT evaluation.  Goals established to assist pt with returning to [facility] regarding ADLs and func mobility.  Pt will benefit from skilled OT services in order to increase her level of safety and independence with ADLs and mobility.        Cinthia Price, OT  8/23/2020

## 2020-08-23 NOTE — PLAN OF CARE
(Physician in Lead of Transfers)   Outside Transfer Acceptance Note / [facility]    Name: Rosalia Fagan    Transferring Physician:  Maycol Small MD//Emergency Medicine    Accepting Physician: KEYONA Fernandez MD    Date of Acceptance:  August 22, 2020    Transferring Facility: [facility] ED     Destination Facility and Admitting Physician: [facility] telemetry///Dr. Isabela MD//Hospital Medicine     Reason for Transfer:  Higher level of care, needs neurology evaluation (not available at the current facility    Report from Transferring Physician/Hospital course:   59-year-old female with a history of hypertension, hyperlipidemia, COPD who presented to the emergency room with right-sided weakness for 2-3 days.  She has decreased strength in the upper and lower extremity on the right as well as a facial droop.  She was initially seen at another emergency department on August 21 with similar findings.  She refused transfer and signed out against medical advice.  She presented back to the emergency department at [facility] today with persistent symptoms.  Vascular Neurology recommended further imaging and Neurologic work-up.      I spoke with hospital medicine at [place] ([place]) and Neurology ([facility]).    She received 325 mg of aspirin the emergency department.    VS:  Temperature 97.8°, pulse 76, respirations 18, blood pressure 147/87, oxygen saturation 98% on room air    Labs:  White blood cell count 10.85, hemoglobin 14.1, hematocrit 42.5, platelet count 275, PT 10.6, PTT 31.1,, sodium 140, potassium 3.8, CO2 25, BUN 13, creatinine 0.8, glucose 117, AST 15, ALT 12, BNP 69, , troponin 0.03, COVID negative    Radiographs:  CT head from August 21 noted normal CT brain.    Chest x-ray from today had no acute abnormalities per the ER physician  CT head from today noted no acute intracranial abnormalities per the ER physician    To Do List: Admit to , stroke workup,  Consult Neurology    Upon patient arrival to floor, please contact Hospital Medicine on call.     KEYONA Fernandez MD  Hospital Medicine Staff  Cell: [phone]

## 2020-08-23 NOTE — ED NOTES
Care assumed.   Pt lying in stretcher resting comfortably. Reports generalized fatigue.    Denies worsening symptoms from onset/arrival.   Denies pain/SOB.  AAO x 4, denies confusion, headache, vision change.   VSS. NAD noted.   Educated on plan of care and v/u.

## 2020-08-23 NOTE — PLAN OF CARE
Problem: Physical Therapy Goal  Goal: Physical Therapy Goal  Description: Goals to be met by: 2020    Patient will increase functional independence with mobility by performin) Supine<>Sit with independence   2) Sit <>Stand with independence  3) Bed <>Chair with independence   4) Pt to ambulate 500 feet with independence   5) Pt to perform BLE X10 mod I with handout.           Outcome: Ongoing, Progressing

## 2020-08-23 NOTE — CONSULTS
"NEUROLOGY FLOOR CONSULT    Reason for consult:  Acute ischemic stroke    Informant: MD Isabela       Other sources of information : patient and relative(s)    CC:  "Right sided weakness, Aphasia, Right facial droop"    HPI:   Rosalia Fagan is a 59 y.o. year old right handed female with PMHx of HTN, HLD, COPD, and Chronic Back Pain who was admitted to Medicine on 8/23/2020 as a transfer from [facility] due to symptoms concerning for acute stroke.     The patient and family report that the patient first began to have symptoms of RSW, aphasia, and RFD starting on 8/21/20. She initially presented to [facility] for workup, but ultimately left [place] due to not being able to afford MRI. Her symptoms persisted for the next day at which point she decided to present to [facility] for workup on 8/22/20. While there she was found to have 3/5 weakness in the RUE and RLE. CT was performed and she was transferred to [facility] for MRI and neurology workup. Today, the patient and family report that she continues to have mild right sided weakness and difficulty with expressing her thoughts.     ROS: Stroke / TIA symptoms include numbness, weakness, dysarthria    Histories:     Allergies:  Patient has no known allergies.    Current Medications:    Current Facility-Administered Medications   Medication Dose Route Frequency Provider Last Rate Last Dose    amLODIPine tablet 10 mg  10 mg Oral Daily Gris Ray NP   10 mg at 08/23/20 0846    aspirin EC tablet 81 mg  81 mg Oral Daily Gris Ray NP   81 mg at 08/23/20 0846    atorvastatin tablet 40 mg  40 mg Oral Daily Gris Ray NP   40 mg at 08/23/20 0847    ondansetron disintegrating tablet 8 mg  8 mg Oral Q8H PRN Gris Ray NP        pneumoc 13-piotr conj-dip cr(PF) (PREVNAR 13 (PF)) 0.5 mL  0.5 mL Intramuscular vaccine x 1 dose Peter Mar MD        sodium chloride 0.9% flush 10 mL  10 mL Intravenous PRN " Gris Ray NP           Past Medical/Surgical/Family History:  Medical:   Past Medical History:   Diagnosis Date    Adrenal adenoma, left 05/2017    Chronic anxiety     Chronic back pain     Closed L3 vertebral fracture 12/2017    fell 2 weeks after back surgery     COPD (chronic obstructive pulmonary disease)     Degenerative joint disease (DJD) of lumbar spine     Diverticulosis of colon     DJD (degenerative joint disease)     Elevated liver enzymes     Encounter for blood transfusion     Fibroids, intramural 02/2016    GERD (gastroesophageal reflux disease)     History of shingles 02/2016    Right flank    Hyperlipemia     Hypertension     Ischemic colitis 10/2017    Lumbar post-laminectomy syndrome     Menopausal and female climacteric states     Muscle contraction headache syndrome     Pancreatitis 04/2017    PTSD (post-traumatic stress disorder)     daughter age 7 killed in the driveway hit by a drunk     Spinal stenosis of lumbar region at multiple levels     Varicose veins of both lower extremities with inflammation     Vitamin D deficiency       Surgeries:   Past Surgical History:   Procedure Laterality Date    APPENDECTOMY  1992    BRE[place]OPSY Right 1992    [place]    [facility]OMY  [facility]    [facility]    COLONOSCOPY N/A 10/10/2017    Procedure: COLONOSCOPY;  Surgeon: Mike Narvaez MD;  Location: [facility];  Service: Endoscopy;  Laterality: N/A;    COLONOSCOPY  10/05/2018    CORONARY ANGIOPLASTY  03/31/2016    EPIDURAL STEROID INJECTION INTO LUMBAR SPINE  11/2014    [facility]    ESOPHAGOGASTRODUODENOSCOPY N/A 10/5/2018    Procedure: EGD (ESOPHAGOGASTRODUODENOSCOPY);  Surgeon: Mike Narvaez MD;  Location: [facility];  Service: Endoscopy;  Laterality: N/A;    LUMBAR LAMINECTOMY WITH FUSION  11/2017    TOTAL ABDOMINAL HYSTERECTOMY W/ BILATERAL SALPINGOOPHORECTOMY  02/2016      Family:   Family History   Problem Relation Age of Onset     "Arthritis Mother         severe lumbar    Hyperlipidemia Mother     Hypertension Mother     Anxiety disorder Mother     Breast cancer Sister     Kidney cancer Brother     Cancer Maternal Aunt     Bladder Cancer Father    , no family history of nerve or muscle disease    Social History:    Tobacco Use    Smoking status: Light Tobacco Smoker       Packs/day: 0.50       Types: Cigarettes, Cigars       Last attempt to quit: 4/29/2017       Years since quitting: 3.3    Smokeless tobacco: Never Used   Substance and Sexual Activity    Alcohol use: No       Frequency: Never       Comment: "OCASSIONALLY"    Drug use: No    Sexual activity: Yes       Partners: Male       Current Evaluation:     Vital Signs:   Vitals:    08/23/20 1549   BP:    Pulse: 87   Resp:    Temp:           ORIENTATION: Within Normal Limits    MEMORY: recent and remote memory intact    LANGUAGE: 1=Mild to moderate aphasia; some obvious loss of fluency or facility of comprehension without significant limitation on ideas expressed or form of expression.    CRANIAL NERVES:  II: visual fields Full to confrontation   II: pupils Equal, round, reactive to light   III,VII: ptosis None   III,IV,VI: extraocular muscles  Full ROM   V: mastication Normal   V: facial light touch sensation  Normal   VII: facial muscle function - upper  Normal   VII: facial muscle function - lower Normal   VIII: hearing Not tested   IX: soft palate elevation  Normal   XI: trapezius strength  5/5   XII: tongue strength  Normal       MOTOR:  Pronator drift: absent    Right UE  3/5  Left UE   5/5  Right deltoid  3/5  Left deltoid  5/5  Right biceps  3/5  Left biceps  5/5  Right hip flexor  4/5  Left hip flexor  5/5  Right quads  4/5  Left quads  5/5  Right anterior tibialis  4/5  Left anterior tibialis  5/5  Right peroneal  4/5  Left peroneal  5/5    no evidence of contractility    Tone: normal    DTR'S:  2+ bilaterally    SENSORY:  normal to light touch and pinprick " on the left side and abnormal to light touch and a pin prick on the right side.    CEREBELLAR/GAIT:  Finger to nose:normal  Heel to shin:normal  Gait: Deferred    NIH Stroke Scale     1a. Level of consciousness 0=alert; keenly responsive   1b. LOC questions 0=Answers both tasks correctly   1c. LOC commands 0=Answers both tasks correctly   2. Best gaze 0=normal   3. Visual 0=No visual loss   4.  Facial palsy 0=Normal symmetric movement   5.  Motor left arm 0=No drift, limb holds 90 (or 45) degrees for full 10 seconds   5b. Motor right arm 1=Drift, limb holds 90 (or 45) degrees but drifts down before full 10 seconds: does not hit bed   6a. Motor left leg 0=No drift, limb holds 90 (or 45) degrees for full 10 seconds   6b. Motor right leg 1=Drift, limb holds 90 (or 45) degrees but drifts down before full 10 seconds: does not hit bed   7. Limb ataxia 0=Absent   8. Sensory 1=Mild to moderate sensory loss; patient feels pinprick is less sharp or is dull on the affected side; there is a loss of superficial pain with pinprick but patient is aware She is being touched   9. Best language 1=Mild to moderate aphasia; some obvious loss of fluency or facility of comprehension without significant limitation on ideas expressed or form of expression.   10. Dysarthria 1=Mild to moderate, patient slurs at least some words and at worst, can be understood with some difficulty   11.  Extinction and inattention 0=No abnormality                                TOTAL: 5       LABORATORY STUDIES:  Recent Results (from the past 24 hour(s))   Comprehensive metabolic panel    Collection Time: 08/23/20  4:14 AM   Result Value Ref Range    Sodium 138 136 - 145 mmol/L    Potassium 3.8 3.5 - 5.1 mmol/L    Chloride 107 95 - 110 mmol/L    CO2 23 23 - 29 mmol/L    Glucose 95 70 - 110 mg/dL    BUN, Bld 12 6 - 20 mg/dL    Creatinine 0.7 0.5 - 1.4 mg/dL    Calcium 9.1 8.7 - 10.5 mg/dL    Total Protein 7.1 6.0 - 8.4 g/dL    Albumin 3.6 3.5 - 5.2 g/dL     Total Bilirubin 0.5 0.1 - 1.0 mg/dL    Alkaline Phosphatase 88 55 - 135 U/L    AST 15 10 - 40 U/L    ALT 10 10 - 44 U/L    Anion Gap 8 8 - 16 mmol/L    eGFR if African American >60 >60 mL/min/1.73 m^2    eGFR if non African American >60 >60 mL/min/1.73 m^2   Magnesium    Collection Time: 08/23/20  4:14 AM   Result Value Ref Range    Magnesium 1.9 1.6 - 2.6 mg/dL   Phosphorus    Collection Time: 08/23/20  4:14 AM   Result Value Ref Range    Phosphorus 3.3 2.7 - 4.5 mg/dL   Hemoglobin A1c    Collection Time: 08/23/20  4:14 AM   Result Value Ref Range    Hemoglobin A1C 5.3 4.0 - 5.6 %    Estimated Avg Glucose 105 68 - 131 mg/dL   CBC auto differential    Collection Time: 08/23/20  4:14 AM   Result Value Ref Range    WBC 9.69 3.90 - 12.70 K/uL    RBC 4.59 4.00 - 5.40 M/uL    Hemoglobin 13.8 12.0 - 16.0 g/dL    Hematocrit 42.3 37.0 - 48.5 %    Mean Corpuscular Volume 92 82 - 98 fL    Mean Corpuscular Hemoglobin 30.1 27.0 - 31.0 pg    Mean Corpuscular Hemoglobin Conc 32.6 32.0 - 36.0 g/dL    RDW 12.8 11.5 - 14.5 %    Platelets 249 150 - 350 K/uL    MPV 10.5 9.2 - 12.9 fL    Immature Granulocytes 0.2 0.0 - 0.5 %    Gran # (ANC) 5.5 1.8 - 7.7 K/uL    Immature Grans (Abs) 0.02 0.00 - 0.04 K/uL    Lymph # 3.2 1.0 - 4.8 K/uL    Mono # 0.7 0.3 - 1.0 K/uL    Eos # 0.2 0.0 - 0.5 K/uL    Baso # 0.07 0.00 - 0.20 K/uL    nRBC 0 0 /100 WBC    Gran% 56.9 38.0 - 73.0 %    Lymph% 32.7 18.0 - 48.0 %    Mono% 7.6 4.0 - 15.0 %    Eosinophil% 1.9 0.0 - 8.0 %    Basophil% 0.7 0.0 - 1.9 %    Differential Method Automated    Troponin I    Collection Time: 08/23/20  4:14 AM   Result Value Ref Range    Troponin I 0.034 (H) 0.000 - 0.026 ng/mL   Urinalysis - clean catch    Collection Time: 08/23/20  5:11 AM   Result Value Ref Range    Specimen UA Urine, Clean Catch     Color, UA Yellow Yellow, Straw, Barbi    Appearance, UA Clear Clear    pH, UA 6.0 5.0 - 8.0    Specific Gravity, UA 1.020 1.005 - 1.030    Protein, UA Negative Negative    Glucose,  UA Negative Negative    Ketones, UA Negative Negative    Bilirubin (UA) Negative Negative    Occult Blood UA 2+ (A) Negative    Nitrite, UA Negative Negative    Urobilinogen, UA Negative <2.0 EU/dL    Leukocytes, UA Negative Negative   Urinalysis Microscopic    Collection Time: 08/23/20  5:11 AM   Result Value Ref Range    RBC, UA 5 (H) 0 - 4 /hpf    WBC, UA 2 0 - 5 /hpf    Bacteria Rare None-Occ /hpf    Squam Epithel, UA 3 /hpf    Microscopic Comment SEE COMMENT        Thyroid 0.912  HgA1C%:  5.3  Vit B12: Pending  Folate:  Pending  LDL:  100.6    RADIOLOGY STUDIES:  I have personally reviewed the images performed.     HEAD CT:  FINDINGS:  The ventricles, sulci, fissures, white matter are unremarkable in appearance for the patient's age.  No acute intracranial hemorrhage.  No intracranial mass effect.  No acute major vascular territory infarct.  Note is made that MRI is typically more sensitive than CT particular for detection of early or small nonhemorrhagic infarct.  The calvarium appears intact, mastoids well pneumatized, visualized paranasal sinuses essentially clear     Impression:  No acute abnormality.    BRAIN MRI: pending    US Carotid B/L:   FINDINGS:  Right:  Internal Carotid Artery (ICA) peak systolic velocity 77 cm/sec  ICA/CCA peak systolic velocity ratio: 117  Plaque formation: Minimal calcified  Vertebral artery: Antegrade flow and normal waveform.     Left:  Internal Carotid Artery (ICA)  peak systolic velocity 0 cm/sec  ICA/CCA peak systolic velocity ratio: Not able to be obtained  Plaque formation: Calcified and noncalcified  Vertebral artery: Antegrade flow and normal waveform.     Impression:  Left ICA occluded at its origin.  Acuity uncertain in the absence of prior imaging for comparison.    Assessment:  P     59 y.o. year old right handed female with PMHx of HTN, HLD, COPD, and Chronic Back Pain who was admitted to Medicine on 8/23/2020 as a transfer from [facility] due to symptoms  concerning for acute stroke.     1. Acute Ischemic CVA  - Patient with symptoms concerning for ischemic stroke involving the L-MCA territory  - Not tPA candidate or thrombectomy candidate due to out of window  - CTH without acute findings  - Carotid U/S B/L with occlusion of L-ICA immediately after the bifurcation  - CTA Head and Neck - official read pending  - Per my read, CTA appears to have either thready flow through the L-ICA vs backflow  - If salvageable flow found on official CTA read, recommend vascular surgery consult for possible CEA vs Stenting  - F/U MRI Brain results when able  - Recommend starting ASA 81mg and Kalyn Intensity Statin (Lipitor 80mg)  - Pending potential vascular intervention, hold Plavix for CTA read  - If no vascular intervention planned or if CTA read taking longer than this evening to come back, start Plavix 75mg pending MRI results  - Permissive HTN with SBP goal < 220  - Glucose goal 140-180  - Laboratory or Radiological studies needed: B12, Folate  - Other medical management per primary team    Differential diagnosis was explained to the patient. All questions were answered. Patient understood and agreed to adhere to plan.       Case discussed with Dr. Earl    Will follow for additional input regarding management of current neurologic condition and monitor for any new signs/symptoms to suggest neurologic detrimental changes.     Appreciate the consult.     Billy Cornejo MD  [employer] [facility], PGY-2

## 2020-08-23 NOTE — PT/OT/SLP EVAL
Occupational Therapy   Evaluation    Name: Rosalia Fagan  MRN: [id number]  Admitting Diagnosis:  CVA (cerebral vascular accident)      Recommendations:     Discharge Recommendations: rehabilitation facility  Discharge Equipment Recommendations:  (TBD)  Barriers to discharge:  None    Assessment:     Rosalia Fagan is a 59 y.o. female with a medical diagnosis of CVA (cerebral vascular accident).  She presents with the following performance deficits affecting function: weakness, impaired endurance, impaired sensation, impaired self care skills, impaired functional mobilty, gait instability, impaired balance, impaired cognition, decreased coordination, decreased upper extremity function, decreased lower extremity function, decreased safety awareness, impaired coordination, impaired fine motor.   Pt was agreeable to OT/PT evaluation. Pt stated she was independent with all mobility and ADLs with out AD prior to this hospitalization.  Pt currently noted with R sided weakness and impaired coordination of R hand during this evaluation, which limits her FM function during ADLS. Goals established to assist pt with returning to PLOF regarding ADLs and func mobility.  Pt will benefit from skilled OT services in order to increase her level of safety and independence with ADLs and mobility.      Rehab Prognosis: Good; patient would benefit from acute skilled OT services to address these deficits and reach maximum level of function.       Plan:     Patient to be seen 4 x/week to address the above listed problems via self-care/home management, therapeutic activities, therapeutic exercises, neuromuscular re-education  · Plan of Care Expires: 09/22/20  · Plan of Care Reviewed with: patient, son    Subjective     Chief Complaint: weakness in R UE  Patient/Family Comments/goals: pt wants to be able to return to work    Occupational Profile:  Living Environment: pt lives alone in a mobile home with 4 JARED to enter with B rails -  has tub/shower combo for bathing   Previous level of function: independent with all mobility and ADLs without AD  Roles and Routines: mother, employee - pt works as  and drives  Equipment Used at Home:  none  Assistance upon Discharge: son and daughter will assist    Pain/Comfort:  · Pain Rating 1: 0/10  · Pain Rating Post-Intervention 1: 0/10    Patients cultural, spiritual, Lutheran conflicts given the current situation:      Objective:     Communicated with: nurse prior to session.  Patient found HOB elevated with bed alarm, telemetry upon OT entry to room.    General Precautions: Standard, fall   Orthopedic Precautions:N/A   Braces: N/A     Occupational Performance:    Bed Mobility:    · Patient completed Scooting/Bridging with contact guard assistance  · Patient completed Supine to Sit with contact guard assistance    Functional Mobility/Transfers:  · Patient completed Sit <> Stand Transfer with contact guard assistance  with  hand-held assist   · Patient completed Bed <> Chair Transfer using Step Transfer technique with minimum assistance with hand-held assist  · Functional Mobility: Pt able to walk ~ 150' with PT using HHA - refer to PT eval for specifics    Activities of Daily Living:  · Upper Body Dressing: minimum assistance to [facility] gown as qasim  · Lower Body Dressing: supervision set up A  · Toileting: not observed Pt states she went to bathroom with nurse and able to perform hygiene    Cognitive/Visual Perceptual:  Cognitive/Psychosocial Skills:     -       Oriented to: Person, Place and Situation   -       Follows Commands/attention:Follows two-step commands  -       Communication: not very vocal during eval - some delay before responding to question on goal  -       Memory: No Deficits noted  -       Safety awareness/insight to disability: impaired   -       Mood/Affect/Coping skills/emotional control: Appropriate to situation    Physical Exam:  Balance: -       sitting = good;  standing = CGA  Postural examination/scapula alignment:    -       Rounded shoulders  Skin integrity: Visible skin intact  Sensation: -       Impaired  impaired light touch on R hand  Motor Planning: -       difficulty with motor planning / initiation on R UE - difficulty with thumb opposition to all digits to R - required demonstration on some skills before performing  Dominant hand: -       right  Upper Extremity Range of Motion:     -       Right Upper Extremity: WFL  -       Left Upper Extremity: WFL  Upper Extremity Strength:    -       Right Upper Extremity: able to move against gravity - difficulty against resistance  -       Left Upper Extremity: WFL   Strength:    -       Right Upper Extremity: fair  -       Left Upper Extremity: WFL  Fine Motor Coordination:    -       Impaired  Left hand, finger to nose impaired, Right hand, finger to nose impaired, Left hand thumb/finger opposition skills impaired on digit 5 and Right hand thumb/finger opposition skills impaired  Gross motor coordination: impaired on R    AMPAC 6 Click ADL:  AMPAC Total Score: 18    Treatment & Education:  · Pt completed ADLs and func mobility activities for tx session as noted above  · Reviewed guidelines to incorporate R UE into daily activities:  Holding cup, playing games on phone, finger foods, etc.  · Pt educated on role of OT and POC    Education:    Patient left up in chair with call button in reach and son present    GOALS:   Multidisciplinary Problems     Occupational Therapy Goals        Problem: Occupational Therapy Goal    Goal Priority Disciplines Outcome Interventions   Occupational Therapy Goal     OT, PT/OT Ongoing, Progressing    Description: Goals to be met by: 09/23/2020     Patient will increase functional independence with ADLs by performing:    Feeding with Modified Iron.  UE Dressing with Modified Iron.  LE Dressing with Modified Iron.  Grooming while standing at sink with Modified  Barren.  Toileting from toilet with Modified Barren for hygiene and clothing management.   Toilet transfer to toilet with Modified Barren.                     History:     Past Medical History:   Diagnosis Date    Adrenal adenoma, left 05/2017    Chronic anxiety     Chronic back pain     Closed L3 vertebral fracture 12/2017    fell 2 weeks after back surgery     COPD (chronic obstructive pulmonary disease)     Degenerative joint disease (DJD) of lumbar spine     Diverticulosis of colon     DJD (degenerative joint disease)     Elevated liver enzymes     Encounter for blood transfusion     Fibroids, intramural 02/2016    GERD (gastroesophageal reflux disease)     History of shingles 02/2016    Right flank    Hyperlipemia     Hypertension     Ischemic colitis 10/2017    Lumbar post-laminectomy syndrome     Menopausal and female climacteric states     Muscle contraction headache syndrome     Pancreatitis 04/2017    PTSD (post-traumatic stress disorder)     daughter age 7 killed in the driveway hit by a drunk     Spinal stenosis of lumbar region at multiple levels     Varicose veins of both lower extremities with inflammation     Vitamin D deficiency        Past Surgical History:   Procedure Laterality Date    APPENDECTOMY  1992    BREAST BIOPSY Right 1992    [place]    [place]    [facility] [place] COLONOSCOPY N/A 10/10/2017    Procedure: COLONOSCOPY;  Surgeon: Mike Narvaez MD;  Location: [facility];  Service: Endoscopy;  Laterality: N/A;    COLONOSCOPY  10/05/2018    CORONARY ANGIOPLASTY  03/31/2016    EPIDURAL STEROID INJECTION INTO LUMBAR SPINE  11/2014    [facility]    ESOPHAGOGASTRODUODENOSCOPY N/A 10/5/2018    Procedure: EGD (ESOPHAGOGASTRODUODENOSCOPY);  Surgeon: Mike Narvaez MD;  Location: [facility];  Service: Endoscopy;  Laterality: N/A;    LUMBAR LAMINECTOMY WITH FUSION  11/2017    TOTAL ABDOMINAL HYSTERECTOMY W/ BILATERAL  SALPINGOOPHORECTOMY  02/2016       Time Tracking:     OT Date of Treatment: 08/23/20  OT Start Time: 1057  OT Stop Time: 1120  OT Total Time (min): 23 min    Billable Minutes:Evaluation 15    Cinthia Pirce OT  8/23/2020

## 2020-08-23 NOTE — PT/OT/SLP EVAL
Speech Language Pathology Evaluation  Cognitive/Bedside Swallow    Patient Name:  Rosalia Fagan   MRN:  [id number]  Admitting Diagnosis: CVA (cerebral vascular accident)    Recommendations:                  General Recommendations:  Speech/language therapy and Cognitive-linguistic therapy and diet f/u  Diet recommendations:  Regular, Thin   Aspiration Precautions: 1 bite/sip at a time, Alternating bites/sips, Frequent oral care, HOB to 90 degrees, Meds whole 1 at a time, Remain upright 30 minutes post meal, Small bites/sips and Standard aspiration precautions   General Precautions: Standard, fall  Communication strategies:  provide increased time to answer    History:     Past Medical History:   Diagnosis Date    Adrenal adenoma, left 05/2017    Chronic anxiety     Chronic back pain     Closed L3 vertebral fracture 12/2017    fell 2 weeks after back surgery     COPD (chronic obstructive pulmonary disease)     Degenerative joint disease (DJD) of lumbar spine     Diverticulosis of colon     DJD (degenerative joint disease)     Elevated liver enzymes     Encounter for blood transfusion     Fibroids, intramural 02/2016    GERD (gastroesophageal reflux disease)     History of shingles 02/2016    Right flank    Hyperlipemia     Hypertension     Ischemic colitis 10/2017    Lumbar post-laminectomy syndrome     Menopausal and female climacteric states     Muscle contraction headache syndrome     Pancreatitis 04/2017    PTSD (post-traumatic stress disorder)     daughter age 7 killed in the driveway hit by a drunk     Spinal stenosis of lumbar region at multiple levels     Varicose 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    CHOLECYSTECTOM[facility]  [facility]    [facility]    COLONOSCOPY N/A 10/10/2017    Procedure: COLONOSCOPY;  Surgeon: Mike AGUILAR  "MD Solange;  Location: [facility];  Service: Endoscopy;  Laterality: N/A;    COLONOSCOPY  10/05/2018    CORONARY ANGIOPLASTY  03/31/2016    EPIDURAL STEROID INJECTION INTO LUMBAR SPINE  11/2014    [facility]    ESOPHAGOGASTRODUODENOSCOPY N/A 10/5/2018    Procedure: EGD (ESOPHAGOGASTRODUODENOSCOPY);  Surgeon: Mike Narvaez MD;  Location: [facility];  Service: Endoscopy;  Laterality: N/A;    LUMBAR LAMINECTOMY WITH FUSION  11/2017    TOTAL ABDOMINAL HYSTERECTOMY W/ BILATERAL SALPINGOOPHORECTOMY  02/2016        HPI: Ms. Hendricks is a 59-year-old female with a history of hypertension, hyperlipidemia, COPD, chronic back pain who presented to [place]  emergency department with c/o right-sided weakness for 2 days. She initially presented to [facility] ED for the same but refused to be transferred and left AMA. Head CT was negative. She has aphasia and decreased strength 3/5 in the right upper and lower extremity as well as a right sided facial droop. Her lab work revealed a mild troponin elevation, 0.030 and elevated triglycerides at 207. Vascular Neurology recommended further imaging and neurologic work-up. She was admitted to [facility] hospital medicine for further care.     Social History: Patient lives alone, per pt.    Prior Intubation HX:  N/A    Modified Barium Swallow: None on file, per EMR    Chest X-Rays: No acute cardiopulmonary disease.    CT Head without Contrast: Normal CT of brain.     Prior diet: per pt, regular textures/thin liquids.       Subjective     SLP confirmed with RN prior to entry. Pt found in bed asleep with PCT at bedside taking vitals. Pt agreeable to participate in skilled ST session. However, pt's eyes remained closed and observed with limited cooperation/participation, requiring frequent redirection/prompting to participate in given tasks. Pt with limited spontaneous verbal expression and answered majority of inquires either Y/N or "I don't know"     Patient " "goals: Pt stated, "No" x3, when SLP asked if pt was feeling okay, if she was worried, or if she was in pain, as pt's participation was limited during assessment.     Pain/Comfort:  · Pain Rating 1: 0/10    Objective:     Cognitive Status:    Arousal/Alertness: pt alert, however, eyes closed majority of session and with limited participation. Episodes of pt with no response to SLP's inquires and required repetition from SLP.      Attention: pt with difficulty sustaining attention likely 2/2 pt with eyes closed, requiring frequent reinforcement and/or repetition  to sustain and maintain attn throughout eval    Orientation: Oriented to name, , current month/year and place. However, pt reported "I don't know" when asked to orient to current situation/reasoning for admission.      Memory:   -Immediate Recall - pt able to recall 3-7# digit spans, independently with 100% acc  -Delayed recall- SLP provided pt with 3 unrelated words for pt to recall during time delay tasks. During 1 min delay task, pt was able to independently recall 2/3 given items.  Pt required min semantic cuing from SLP ( category) to recall final item. During 3 min delay task, pt was able to independently recall 2/3 given items. Pt stated "I don't know" and required semantic cuing and binary choice to recall final item.      Problem Solving:   -Sequencing -  pt able to sequence currency in order from least to greatest with 100% acc. However, pt denied/deferred simple addition task related to currency -- pt stated "I don't know," despite prompting provided by SLP.      -Solutions- TBA                 Receptive Language:   Comprehension:   Questions:   -Simple yes/no -100% acc  - Complex yes/no -70% acc  - Open ended questions - pt with limited responses to open ended questions -- majority of responses with Y/N or "I don't know"     Commands:   - One step -100% ac  - two step basic commands -70% acc  - multistep basic commands -less than 50% acc    Pt " "able to identify objects -- 10 in Fo 10     Expressive Language:  Verbal:    Automatic Speech:   - Counting - pt able to state #s 1-20 indepedently    - Months of the year -- pt able to state months of year independently    Repetition:  - Pt able to repeat simple words and short phrases with no deficits noted    Naming:  - Confrontation -pt able to name items within room and body parts independently with 100% acc   - Single word responsive naming -pt able to answer some responsive naming tasks 5/8 -- however, pt deferred further questions and stated "I don't know"   - Divergent naming - pt stated "I don't know" and deferred participation in timed task.      Sentence formulation - Pt with limited spontaneous verbal expression.      Motor Speech:  WFL -- no slurred speech noted during assessment.      Voice:   Pt with rough, harsh vocal quality and low volume     Visual-Spatial:  DNT     Reading:   DNT      Written Expression:   DNT      Oral Musculature Evaluation  · Oral Musculature: facial asymmetry present, right weakness  · Dentition: present and adequate  · Mucosal Quality: adequate  · Mandibular Strength and Mobility: WFL  · Oral Labial Strength and Mobility: impaired pursing, impaired retraction(R sided weakness)  · Lingual Strength and Mobility: functional strength, impaired right lateral movement  · Buccal Strength and Mobility: other (see comments)(R sided buccal weakness)  · Volitional Swallow: timely trigger of swallow and adequate laryngeal elevation/excursion, per hand palpation, prior to PO trials  · Voice Prior to PO Intake: pt with limited spontaneous speech throughout session; however, vocal quality obsevred to be slightly rough/harsh with low volume -- pt is a current smoker per chart review    Bedside Swallow Eval: Pt self-feed (both with L and R hands utilized) PO trials of thin liquids, puree textures and hard solid textures.   Consistencies Assessed:  · Thin liquids -via cup sips (~2-3 oz) and " straw sips (~2-3 oz)  · Puree (pudding) via self-feed tsp x4  · Solids (naina cracker) x3-4 self-feed bites     Oral Phase:   · WFL    Pharyngeal Phase:   · no overt clinical signs/symptoms of aspiration  · no overt clinical signs/symptoms of pharyngeal dysphagia    Compensatory Strategies  · None    Treatment: SLP will continue to follow and treat pt x3/wk to address pt's language and cognitive deficits.     Assessment:     Rosalia Fagan is a 59 y.o. female admitted with CVA (cerebral vascular accident).  She presents with R sided facial weakness/droop; however, pt demonstrates oral and pharyngeal phases of the swallow judged to be WFL -- no overt s/s of aspiration across given consistencies, per subjective observation. Pt also appears to demonstrate language (receptive and expressive) and cognitive deficits; however, limited participation this date and will require further assessment.     SLP notified MD Mar and CEE Cutler of results/recs -- both aware and acknowledged.     Goals:   Multidisciplinary Problems     SLP Goals        Problem: SLP Goal    Goal Priority Disciplines Outcome   SLP Goal     SLP Ongoing, Progressing   Description: Short Term Goals:  1. Pt will participate in BSS to determine least restrictive diet. - MET 8/23/20  2. Pt will tolerate regular textures/thin liquids PO diet with no overt s/s of aspiration.  3. Pt will participate in informal speech-lang eval to r/o cognitive-linguistic deficits. - MET 8/23/20  4. Pt will answer complex Y/N questions with 80% acc ind'ly  5. Pt will follow 2+ step commands with 80% acc ind'ly  6. Pt will complete word retrieval tasks with 80% acc with min cuing  7. Pt will complete higher level mental manipulation tasks (functional problem solving, reasoning, memory, sequencing, etc). with 80% acc with min-mod asst.   8. Pt will participate in OMEs x10 to improve oral motor function/strength   *further goals pending pt's progress*                    Plan:     · Patient to be seen:  3 x/week   · Plan of Care expires:  09/23/20  · Plan of Care reviewed with:  patient, other (see comments)(CEE Cutler and MD Mar)   · SLP Follow-Up:  Yes       Discharge recommendations:  Discharge Facility/Level of Care Needs: other (see comments)(TBD pending pt's progress and PT/OT recs)   Barriers to Discharge:  None    Time Tracking:     SLP Treatment Date:   08/23/20  Speech Start Time:  0808  Speech Stop Time:  0832     Speech Total Time (min):  24 min    Billable Minutes: Eval 16  and Eval Swallow and Oral Function 8    BINA Archibald, CCC-SLP  08/23/2020

## 2020-08-23 NOTE — HPI
Ms. Hendricks is a 59-year-old female with a history of hypertension, hyperlipidemia, COPD, chronic back pain who presented to [place]  emergency department with c/o right-sided weakness for 2 days. She initially presented to [facility] ED for the same but refused to be transferred and left AMA. Head CT was negative. She has aphasia and decreased strength 3/5 in the right upper and lower extremity as well as a right sided facial droop. Her lab work revealed a mild troponin elevation, 0.030 and elevated triglycerides at 207. Vascular Neurology recommended further imaging and neurologic work-up. She was admitted to [facility] hospital medicine for further care.

## 2020-08-23 NOTE — H&P
[facility]  History & Physical    Patient Name: Rosalia Fagan  MRN: [id number]  Admission Date: 8/22/2020  Attending Physician: Peter Mar, *   Primary Care Provider: Barbara Hameed NP         Patient information was obtained from patient, past medical records and ER records.     Subjective:     Principal Problem:CVA (cerebral vascular accident)    Chief Complaint:   Chief Complaint   Patient presents with    Weakness     right side weakness since 4pm yesterday        HPI: Ms. Hendricks is a 59-year-old female with a history of hypertension, hyperlipidemia, COPD, chronic back pain who presented to [place]  emergency department with c/o right-sided weakness for 2 days. She initially presented to [facility] ED for the same but refused to be transferred and left AMA. Head CT was negative. She has aphasia and decreased strength 3/5 in the right upper and lower extremity as well as a right sided facial droop. Her lab work revealed a mild troponin elevation, 0.030 and elevated triglycerides at 207. Vascular Neurology recommended further imaging and neurologic work-up. She was admitted to [facility] hospital medicine for further care.     Past Medical History:   Diagnosis Date    Adrenal adenoma, left 05/2017    Chronic anxiety     Chronic back pain     Closed L3 vertebral fracture 12/2017    fell 2 weeks after back surgery     COPD (chronic obstructive pulmonary disease)     Degenerative joint disease (DJD) of lumbar spine     Diverticulosis of colon     DJD (degenerative joint disease)     Elevated liver enzymes     Encounter for blood transfusion     Fibroids, intramural 02/2016    GERD (gastroesophageal reflux disease)     History of shingles 02/2016    Right flank    Hyperlipemia     Hypertension     Ischemic colitis 10/2017    Lumbar post-laminectomy syndrome     Menopausal and female climacteric states     Muscle contraction  headache syndrome     Pancreatitis 04/2017    PTSD (post-traumatic stress disorder)     daughter age 7 killed in the driveway hit by a drunk     Spinal stenosis of lumbar region at multiple levels     Varicose veins of both lower extremities with inflammation     Vitamin D deficiency        Past Surgical History:   Procedure Laterality Date    APPENDECTOMY  1992    BREAST BIOPSY Right 1992    [place] [facility]    [facility]    COLONOSCOPY N/A 10/10/2017    Procedure: COLONOSCOPY;  Surgeon: Mike Narvaez MD;  Location: [facility] ENDO;  Service: Endoscopy;  Laterality: N/A;    COLONOSCOPY  10/05/2018    CORONARY ANGIOPLASTY  03/31/2016    EPIDURAL STEROID INJECTION INTO LUMBAR SPINE  11/2014    [facility]    ESOPHAGOGASTRODUODENOSCOPY N/A 10/5/2018    Procedure: EGD (ESOPHAGOGASTRODUODENOSCOPY);  Surgeon: Mike Narvaez MD;  Location: [facility] ENDO;  Service: Endoscopy;  Laterality: N/A;    LUMBAR LAMINECTOMY WITH FUSION  11/2017    TOTAL ABDOMINAL HYSTERECTOMY W/ BILATERAL SALPINGOOPHORECTOMY  02/2016       Review of patient's allergies indicates:  No Known Allergies    Current Facility-Administered Medications on File Prior to Encounter   Medication    [COMPLETED] aspirin tablet 325 mg     Current Outpatient Medications on File Prior to Encounter   Medication Sig    ALPRAZolam (XANAX) 0.5 MG tablet TAKE 1 TABLET BY MOUTH THREE TIMES A DAY AS NEEDED ANXIETY    amlodipine-benazepril 10-20mg (LOTREL) 10-20 mg per capsule TAKE 1 CAPSULE BY MOUTH EVERY DAY    cyanocobalamin (VITAMIN B-12) 1000 MCG tablet Take 1,000 mcg by mouth once daily.    cyclobenzaprine (FLEXERIL) 10 MG tablet TAKE 1 TABLET BY MOUTH THREE TIMES A DAY AS NEEDED FOR MUSCLE SPASMS    estradiol 0.05 mg/24 hr td ptsw (VIVELLE-DOT) 0.05 mg/24 hr Place 1 patch onto the skin twice a week.    gabapentin (NEURONTIN) 300 MG capsule TAKE 2 CAPSULES (600 MG TOTAL) BY MOUTH 3 (THREE) TIMES DAILY.    meloxicam (MOBIC) 15  "MG tablet Take 1 tablet (15 mg total) by mouth once daily. Take with food    multivitamin capsule Take 1 capsule by mouth once daily.    pantoprazole (PROTONIX) 40 MG tablet Take 1 tablet (40 mg total) by mouth once daily.    spironolactone (ALDACTONE) 100 MG tablet Take 1 tablet (100 mg total) by mouth once daily.     Family History     Problem Relation (Age of Onset)    Anxiety disorder Mother    Arthritis Mother    Bladder Cancer Father    Breast cancer Sister    Cancer Maternal Aunt    Hyperlipidemia Mother    Hypertension Mother    Kidney cancer Brother        Tobacco Use    Smoking status: Light Tobacco Smoker     Packs/day: 0.50     Types: Cigarettes, Cigars     Last attempt to quit: 4/29/2017     Years since quitting: 3.3    Smokeless tobacco: Never Used   Substance and Sexual Activity    Alcohol use: No     Frequency: Never     Comment: "OCASSIONALLY"    Drug use: No    Sexual activity: Yes     Partners: Male     Review of Systems   Unable to perform ROS: Other (aphasia)   Neurological: Negative for numbness.     Objective:     Vital Signs (Most Recent):  Temp: 98 °F (36.7 °C) (08/22/20 2358)  Pulse: 67 (08/23/20 0003)  Resp: 18 (08/22/20 2358)  BP: (!) 143/84 (08/22/20 2358)  SpO2: 98 % (08/22/20 2358) Vital Signs (24h Range):  Temp:  [97.3 °F (36.3 °C)-98 °F (36.7 °C)] 98 °F (36.7 °C)  Pulse:  [66-94] 67  Resp:  [18] 18  SpO2:  [95 %-98 %] 98 %  BP: (124-155)/(72-89) 143/84     Weight: 94.1 kg (207 lb 7.3 oz)  Body mass index is 32.49 kg/m².    Physical Exam  Vitals signs and nursing note reviewed.   Constitutional:       General: She is not in acute distress.     Appearance: She is obese.   HENT:      Head: Normocephalic and atraumatic.   Eyes:      Pupils: Pupils are equal, round, and reactive to light.   Neck:      Musculoskeletal: Normal range of motion.   Cardiovascular:      Rate and Rhythm: Normal rate and regular rhythm.      Pulses: Normal pulses.      Heart sounds: Normal heart sounds. " "  Pulmonary:      Effort: Pulmonary effort is normal.      Breath sounds: Normal breath sounds.   Abdominal:      General: Bowel sounds are normal.   Musculoskeletal:         General: No swelling.   Skin:     General: Skin is warm and dry.   Neurological:      Mental Status: She is alert.      Comments: Right side weakness, 3/5 strength, aphasia   Psychiatric:      Comments: tearful           CRANIAL NERVES     CN III, IV, VI   Pupils are equal, round, and reactive to light.       Significant Labs:   A1C: No results for input(s): HGBA1C in the last 4320 hours.  CBC:   Recent Labs   Lab 08/21/20  1308 08/22/20  1643   WBC 10.46 10.85   HGB 13.4 14.1   HCT 41.7 42.5    275     CMP:   Recent Labs   Lab 08/21/20  1308 08/22/20  1643    140   K 4.2 3.8    106   CO2 25 25   GLU 90 117*   BUN 14 13   CREATININE 0.8 0.8   CALCIUM 8.9 8.9   PROT 7.3 7.4   ALBUMIN 3.5 3.6   BILITOT 0.2 0.4   ALKPHOS 88 90   AST 16 15   ALT 12 12   ANIONGAP 9 9   EGFRNONAA >60.0 >60     Cardiac Markers:   Recent Labs   Lab 08/22/20  1643   BNP 69     Coagulation:   Recent Labs   Lab 08/22/20  1643   INR 1.0   APTT 31.1     Lipid Panel:   Recent Labs   Lab 08/21/20  1308   CHOL 185   HDL 43   LDLCALC 100.6   TRIG 207*   CHOLHDL 23.2     Magnesium: No results for input(s): MG in the last 48 hours.  POCT Glucose:   Recent Labs   Lab 08/21/20  1157   POCTGLUCOSE 89     Troponin:   Recent Labs   Lab 08/22/20  1643   TROPONINI 0.030*     TSH:   Recent Labs   Lab 08/21/20  1308   TSH 0.912       Significant Imaging: I have reviewed all pertinent imaging results/findings within the past 24 hours.    Assessment/Plan:     * CVA (cerebral vascular accident)  The patient presents with aphasia and right side weakness 3/5  She is tearful but not able to say much, slowed response but states "I don't know" when asked why she is here    Consult neurology   MRI of brain  MRA head and neck  Carotid US  Echo with bubble  Atorvastatin 40mg " daily  ASA 81mg daily      Hyperlipemia  Not on home statin  Lipid levels WNL with elevated triglyceride noted  Start atorvastatin 40mg      Hypertension   on arrival  Patient home meds: amplodipine/benazapril, aldactone  Patient unsure of medications    Will give amlodipine 10mg for now               VTE Risk Mitigation (From admission, onward)         Ordered     IP VTE LOW RISK PATIENT  Once      08/23/20 0016     Place sequential compression device  Until discontinued      08/23/20 0016                   Gris Ray NP  Department of Hospital Medicine   [facility]

## 2020-08-23 NOTE — PLAN OF CARE
2358H Received patient from [facility] ED per stretcher; awake and orientedx4. Care plan explained and verbalized understanding. VIP care model introduced. On room air, O2 saturation 98%. Hooked to heart monitor running Sinus Rhythm, heart rate 60-70s. IV site to left forearm 20G; saline locked. Instructed on NPO. Instructed on fall precaution. Fall risk contract signed. Bed in lowest position, bed alarms on, call light within reach and instructed to call for help when needed.     NIHSS-3, neurocheck every 4 hours. Right facial droop and right sided weakness noted. No complains of pain or shortness of breath. Passed the MICHELLE screen swallow test.     Refused VN admit questions, she said she would to do it later and wants to get some rest.

## 2020-08-23 NOTE — ED NOTES
No change in neurological status.   Awaiting [facility] for transfer to [facility].   Pt aware of plan and v/u.

## 2020-08-24 ENCOUNTER — CLINICAL SUPPORT (OUTPATIENT)
Dept: SMOKING CESSATION | Facility: CLINIC | Age: 60
End: 2020-08-24
Payer: COMMERCIAL

## 2020-08-24 DIAGNOSIS — F17.210 CIGARETTE SMOKER: Primary | ICD-10-CM

## 2020-08-24 LAB
ALBUMIN SERPL BCP-MCNC: 3.8 G/DL (ref 3.5–5.2)
ALP SERPL-CCNC: 100 U/L (ref 55–135)
ALT SERPL W/O P-5'-P-CCNC: 12 U/L (ref 10–44)
ANION GAP SERPL CALC-SCNC: 10 MMOL/L (ref 8–16)
AORTIC ROOT ANNULUS: 3.44 CM
AST SERPL-CCNC: 20 U/L (ref 10–40)
AV INDEX (PROSTH): 0.63
AV MEAN GRADIENT: 2 MMHG
AV PEAK GRADIENT: 3 MMHG
AV VALVE AREA: 2.19 CM2
AV VELOCITY RATIO: 0.61
BASOPHILS # BLD AUTO: 0.08 K/UL (ref 0–0.2)
BASOPHILS NFR BLD: 0.9 % (ref 0–1.9)
BILIRUB SERPL-MCNC: 0.5 MG/DL (ref 0.1–1)
BSA FOR ECHO PROCEDURE: 2.14 M2
BUN SERPL-MCNC: 13 MG/DL (ref 6–20)
CALCIUM SERPL-MCNC: 9.4 MG/DL (ref 8.7–10.5)
CHLORIDE SERPL-SCNC: 106 MMOL/L (ref 95–110)
CO2 SERPL-SCNC: 24 MMOL/L (ref 23–29)
CREAT SERPL-MCNC: 0.7 MG/DL (ref 0.5–1.4)
CV ECHO LV RWT: 0.5 CM
DIFFERENTIAL METHOD: NORMAL
DOP CALC AO PEAK VEL: 0.87 M/S
DOP CALC AO VTI: 13.94 CM
DOP CALC LVOT AREA: 3.5 CM2
DOP CALC LVOT DIAMETER: 2.1 CM
DOP CALC LVOT PEAK VEL: 0.53 M/S
DOP CALC LVOT STROKE VOLUME: 30.53 CM3
DOP CALCLVOT PEAK VEL VTI: 8.82 CM
ECHO LV POSTERIOR WALL: 1.03 CM (ref 0.6–1.1)
EOSINOPHIL # BLD AUTO: 0.2 K/UL (ref 0–0.5)
EOSINOPHIL NFR BLD: 1.8 % (ref 0–8)
ERYTHROCYTE [DISTWIDTH] IN BLOOD BY AUTOMATED COUNT: 12.7 % (ref 11.5–14.5)
EST. GFR  (AFRICAN AMERICAN): >60 ML/MIN/1.73 M^2
EST. GFR  (NON AFRICAN AMERICAN): >60 ML/MIN/1.73 M^2
FOLATE SERPL-MCNC: 14.5 NG/ML (ref 4–24)
FRACTIONAL SHORTENING: 5 % (ref 28–44)
GLUCOSE SERPL-MCNC: 91 MG/DL (ref 70–110)
HCT VFR BLD AUTO: 46.4 % (ref 37–48.5)
HGB BLD-MCNC: 15.1 G/DL (ref 12–16)
IMM GRANULOCYTES # BLD AUTO: 0.02 K/UL (ref 0–0.04)
IMM GRANULOCYTES NFR BLD AUTO: 0.2 % (ref 0–0.5)
INTERVENTRICULAR SEPTUM: 0.88 CM (ref 0.6–1.1)
IVRT: 94.2 MSEC
LA MAJOR: 4.76 CM
LA MINOR: 4.66 CM
LA WIDTH: 4.2 CM
LEFT ATRIUM SIZE: 3.51 CM
LEFT ATRIUM VOLUME INDEX: 28.4 ML/M2
LEFT ATRIUM VOLUME: 59.01 CM3
LEFT INTERNAL DIMENSION IN SYSTOLE: 3.9 CM (ref 2.1–4)
LEFT VENTRICLE DIASTOLIC VOLUME INDEX: 35.49 ML/M2
LEFT VENTRICLE DIASTOLIC VOLUME: 73.86 ML
LEFT VENTRICLE MASS INDEX: 59 G/M2
LEFT VENTRICLE SYSTOLIC VOLUME INDEX: 31.6 ML/M2
LEFT VENTRICLE SYSTOLIC VOLUME: 65.86 ML
LEFT VENTRICULAR INTERNAL DIMENSION IN DIASTOLE: 4.09 CM (ref 3.5–6)
LEFT VENTRICULAR MASS: 123.39 G
LYMPHOCYTES # BLD AUTO: 2.4 K/UL (ref 1–4.8)
LYMPHOCYTES NFR BLD: 26.2 % (ref 18–48)
MCH RBC QN AUTO: 29.8 PG (ref 27–31)
MCHC RBC AUTO-ENTMCNC: 32.5 G/DL (ref 32–36)
MCV RBC AUTO: 92 FL (ref 82–98)
MONOCYTES # BLD AUTO: 0.9 K/UL (ref 0.3–1)
MONOCYTES NFR BLD: 9.6 % (ref 4–15)
NEUTROPHILS # BLD AUTO: 5.7 K/UL (ref 1.8–7.7)
NEUTROPHILS NFR BLD: 61.3 % (ref 38–73)
NRBC BLD-RTO: 0 /100 WBC
PLATELET # BLD AUTO: 267 K/UL (ref 150–350)
PMV BLD AUTO: 10.8 FL (ref 9.2–12.9)
POTASSIUM SERPL-SCNC: 4 MMOL/L (ref 3.5–5.1)
PROT SERPL-MCNC: 7.7 G/DL (ref 6–8.4)
PULM VEIN S/D RATIO: 1.18
PV PEAK D VEL: 0.22 M/S
PV PEAK S VEL: 0.26 M/S
RA MAJOR: 4.45 CM
RA PRESSURE: 3 MMHG
RA WIDTH: 3.41 CM
RBC # BLD AUTO: 5.06 M/UL (ref 4–5.4)
RIGHT VENTRICULAR END-DIASTOLIC DIMENSION: 2.74 CM
SODIUM SERPL-SCNC: 140 MMOL/L (ref 136–145)
STJ: 3.28 CM
TDI LATERAL: 0.08 M/S
TDI SEPTAL: 0.04 M/S
TDI: 0.06 M/S
TRICUSPID ANNULAR PLANE SYSTOLIC EXCURSION: 1.61 CM
VIT B12 SERPL-MCNC: >2000 PG/ML (ref 210–950)
WBC # BLD AUTO: 9.27 K/UL (ref 3.9–12.7)

## 2020-08-24 PROCEDURE — 80053 COMPREHEN METABOLIC PANEL: CPT

## 2020-08-24 PROCEDURE — 97110 THERAPEUTIC EXERCISES: CPT | Mod: CQ

## 2020-08-24 PROCEDURE — 97116 GAIT TRAINING THERAPY: CPT | Mod: CQ

## 2020-08-24 PROCEDURE — 25000003 PHARM REV CODE 250: Performed by: HOSPITALIST

## 2020-08-24 PROCEDURE — 99406 BEHAV CHNG SMOKING 3-10 MIN: CPT | Mod: S$GLB,,,

## 2020-08-24 PROCEDURE — 25500020 PHARM REV CODE 255: Performed by: HOSPITALIST

## 2020-08-24 PROCEDURE — 92507 TX SP LANG VOICE COMM INDIV: CPT

## 2020-08-24 PROCEDURE — 36415 COLL VENOUS BLD VENIPUNCTURE: CPT

## 2020-08-24 PROCEDURE — 25000003 PHARM REV CODE 250: Performed by: NURSE PRACTITIONER

## 2020-08-24 PROCEDURE — 85025 COMPLETE CBC W/AUTO DIFF WBC: CPT

## 2020-08-24 PROCEDURE — 82607 VITAMIN B-12: CPT

## 2020-08-24 PROCEDURE — 11000001 HC ACUTE MED/SURG PRIVATE ROOM

## 2020-08-24 PROCEDURE — 82746 ASSAY OF FOLIC ACID SERUM: CPT

## 2020-08-24 PROCEDURE — 99900035 HC TECH TIME PER 15 MIN (STAT)

## 2020-08-24 PROCEDURE — 99406 PT REFUSED TOBACCO CESSATION: ICD-10-PCS | Mod: S$GLB,,,

## 2020-08-24 PROCEDURE — 94761 N-INVAS EAR/PLS OXIMETRY MLT: CPT

## 2020-08-24 PROCEDURE — 97535 SELF CARE MNGMENT TRAINING: CPT | Mod: CO

## 2020-08-24 PROCEDURE — 97530 THERAPEUTIC ACTIVITIES: CPT | Mod: CO

## 2020-08-24 RX ORDER — CLOPIDOGREL BISULFATE 75 MG/1
75 TABLET ORAL DAILY
Qty: 30 TABLET | Refills: 11
Start: 2020-08-24 | End: 2020-09-09

## 2020-08-24 RX ORDER — ATORVASTATIN CALCIUM 40 MG/1
40 TABLET, FILM COATED ORAL DAILY
Qty: 90 TABLET | Refills: 3
Start: 2020-08-24 | End: 2020-09-09

## 2020-08-24 RX ORDER — BENAZEPRIL HYDROCHLORIDE 5 MG/1
10 TABLET ORAL DAILY
Status: DISCONTINUED | OUTPATIENT
Start: 2020-08-25 | End: 2020-08-25

## 2020-08-24 RX ORDER — SPIRONOLACTONE 25 MG/1
25 TABLET ORAL DAILY
Status: DISCONTINUED | OUTPATIENT
Start: 2020-08-25 | End: 2020-08-27 | Stop reason: HOSPADM

## 2020-08-24 RX ORDER — ASPIRIN 81 MG/1
81 TABLET ORAL DAILY
Qty: 30 TABLET | Refills: 11
Start: 2020-08-24 | End: 2020-09-09

## 2020-08-24 RX ADMIN — ASPIRIN 81 MG: 81 TABLET, COATED ORAL at 09:08

## 2020-08-24 RX ADMIN — CLOPIDOGREL 75 MG: 75 TABLET, FILM COATED ORAL at 09:08

## 2020-08-24 RX ADMIN — ATORVASTATIN CALCIUM 40 MG: 40 TABLET, FILM COATED ORAL at 09:08

## 2020-08-24 RX ADMIN — IOHEXOL 100 ML: 350 INJECTION, SOLUTION INTRAVENOUS at 12:08

## 2020-08-24 RX ADMIN — AMLODIPINE BESYLATE 10 MG: 5 TABLET ORAL at 09:08

## 2020-08-24 NOTE — PLAN OF CARE
Patient is AAO x4. Patient is on telemetry, SR 80s , no ectopy noted. Patient on room air. Neuro checks every 4 hours, right side weakness and facial droop. Patient has Right 20 AC and left 20 FA, saline flushed. Patient ambulate with assistance. Call for assistance. Skin intact. Patient bed alarm is on, bed in the lowest position, and call light within reach.

## 2020-08-24 NOTE — PROGRESS NOTES
"  Smoking cessation education: Pt not forthcoming with age started smoking (answered with a shoulder shrug).  When asked whether pt has considered cutting down or quitting smoking, pt shook her head "No".  Pt is able to answer name, , etc. Handout provided for Ambulatory Smoking Cessation program in the event pt should require resources at a later time.   "

## 2020-08-24 NOTE — PLAN OF CARE
Recommendation:   1. Encourage intake at meals as tolerated.   2. Monitor need for supplements.   3. RD to follow up to monitor po intake and need for diet education    Goals:   Pt will consume at least 50-75% intake at meals by RD follow up  Nutrition Goal Status: new

## 2020-08-24 NOTE — PLAN OF CARE
Patient is awake and oriented except to time. Care plan explained. Telesitter Avasys placed at bedside, patient gets up without calling for help. Remains on room air. Sinus Rhythm on the monitor, heart rate 80s. Neurochecks every 4 hours, right facial droop and right-sided weakness still noted. Ambulates to the bathroom with assist. Due medications given, swallows pill good.     No complaints of pain or shortness of breath.

## 2020-08-24 NOTE — PT/OT/SLP PROGRESS
Physical Therapy Treatment    Patient Name:  Rosalia Fagan   MRN:  [id number]    Recommendations:     Discharge Recommendations:  rehabilitation facility   Discharge Equipment Recommendations: (defer to rehab)   Barriers to discharge: Decreased caregiver support    Assessment:     Rosalia Fagan is a 59 y.o. female admitted with a medical diagnosis of Cerebrovascular accident (CVA) due to embolism.  She presents with the following impairments/functional limitations:  weakness, impaired endurance, impaired functional mobilty, gait instability, impaired balance, impaired self care skills, decreased upper extremity function, decreased lower extremity function, decreased safety awareness, decreased ROM, impaired coordination.  Pt would continue to benefit from P.T. in an impatient rehabilitation setting to address impairments listed above, and assist pt's return to PLOF.  .    Rehab Prognosis: Fair+; patient would benefit from acute skilled PT services to address these deficits and reach maximum level of function.    Recent Surgery: * No surgery found *      Plan:     During this hospitalization, patient to be seen 6 x/week to address the identified rehab impairments via gait training, therapeutic activities, therapeutic exercises, therapeutic groups and progress toward the following goals:    · Plan of Care Expires:  09/23/20    Subjective     Patient/Family Comments/goals: Pt agreed to tx.  Pain/Comfort:  · Pain Rating 1: 0/10  · Pain Rating Post-Intervention 1: 0/10      Objective:     Communicated with RN (Laura) prior to session.  Patient found supine with bed alarm, telemetry upon PT entry to room.     General Precautions: Standard, fall   Orthopedic Precautions:    Braces:       Functional Mobility:  · Bed Mobility:     · Rolling Left:  contact guard assistance and vc's  · Scooting: stand by assistance and to EOB  · Supine to Sit: stand by assistance  · Sit to Supine: stand by assistance  · Transfers:      · Sit to Stand:  contact guard assistance(HHA)  x 3 reps  · Gait: 90ft, 75ft x 2 with HHA(R hand close CGA/Toby). Pt ambulates with a WBOS, decreased consuelo, increased lateral sway bilaterally, but more so to the right, decreased step length R > L.  · Balance: sitting fair+, standing fair, gait fair-/fair      AM-PAC 6 CLICK MOBILITY  Turning over in bed (including adjusting bedclothes, sheets and blankets)?: 4  Sitting down on and standing up from a chair with arms (e.g., wheelchair, bedside commode, etc.): 3  Moving from lying on back to sitting on the side of the bed?: 3  Moving to and from a bed to a chair (including a wheelchair)?: 3  Need to walk in hospital room?: 3  Climbing 3-5 steps with a railing?: 3  Basic Mobility Total Score: 19       Therapeutic Activities and Exercises:   Pt sat EOB and performed BLE therex: AP, LAQ(assist on R for increased ROM), hip flexion with CGA with tc's/vc's to decreased trunk extension when performing hip flexion.  All x 15 reps.  Pt sat EOB and looked to the left many times when talking to pt.  Pt only responded with yes, no, or I don't know for answers during tx.  Ambulation as above with standing rest breaks between bouts secondary to PTA noticing a decline in quality of gait and pt appearing fatigued.  One bout mild LOB when turning with MinaA to recover.  Very close CGA(PTA holding pt's right hand and gait belt), but pt demonstrated bouts of instability with gait and would not be safe without assistance at this time.    Patient left supine with all lines intact, call button in reach, bed alarm on and RN notified..    GOALS:   Multidisciplinary Problems     Physical Therapy Goals        Problem: Physical Therapy Goal    Goal Priority Disciplines Outcome Goal Variances Interventions   Physical Therapy Goal     PT, PT/OT Ongoing, Progressing     Description: Goals to be met by: 2020    Patient will increase functional independence with mobility by performin)  Supine<>Sit with independence   2) Sit <>Stand with independence  3) Bed <>Chair with independence   4) Pt to ambulate 500 feet with independence   5) Pt to perform BLE X10 mod I with handout.                            Time Tracking:     PT Received On: 08/24/20  PT Start Time: 1527     PT Stop Time: 1550  PT Total Time (min): 23 min     Billable Minutes: Gait Training 12 and Therapeutic Exercise 11    Treatment Type: Treatment  PT/PTA: PTA     PTA Visit Number: 1     Shantal Greco PTA  08/24/2020

## 2020-08-24 NOTE — PLAN OF CARE
VIRTUAL NURSE:  Cued into patient's room.  Permission received per patient to turn camera to view patient.  Introduced as VN for night shift that will be working with floor nurse and nursing assistant.  Educated patient on VN's role in patient care and  VIP model.  Plan of care reviewed with patient.  Education per flowsheet.   Informed patient t to use call light for any other needs they may have; informed of fall risk and fall precautions. Stroke education done with patient. Patient verbalized understanding.  Call light within reach; bed siderails up x3.  Opportunity given for questions and questions answered.  Patient denies complaints or any needs at this time.  Will cont to monitor and intervene as needed.

## 2020-08-24 NOTE — PROGRESS NOTES
[facility] Medicine  Progress Note    Patient Name: Rosalia Fagan  MRN: [id number]  Patient Class: IP- Inpatient   Admission Date: 8/22/2020  Length of Stay: 0 days  Attending Physician: Peter Mar, *  Primary Care Provider: Barbara Hameed NP        Subjective:     Principal Problem:Cerebrovascular accident (CVA) due to embolism        HPI:  Ms. Hendricks is a 59-year-old female with a history of hypertension, hyperlipidemia, COPD, chronic back pain who presented to [place]  emergency department with c/o right-sided weakness for 2 days. She initially presented to [facility] ED for the same but refused to be transferred and left AMA. Head CT was negative. She has aphasia and decreased strength 3/5 in the right upper and lower extremity as well as a right sided facial droop. Her lab work revealed a mild troponin elevation, 0.030 and elevated triglycerides at 207. Vascular Neurology recommended further imaging and neurologic work-up. She was admitted to [facility] hospital medicine for further care.     Overview/Hospital Course:  No notes on file    Interval History: Weakness improving somewhat today. Has had extensive workup: carotid US concerning for left ICA occlusion; CTA looks chronic. Discussed with Vascular Neurology Dr. Hay who states no acute intervention would be beneficial due to chronic nature. Added on plavix.    TTE today + for PFO and with concern for RV strain. Checking CTA to r/o PE. Otherwise, awaiting IPR.    Review of Systems   Constitutional: Negative for fever.   Respiratory: Negative for shortness of breath.    Cardiovascular: Negative for chest pain.   Neurological: Positive for weakness. Negative for headaches.     Objective:     Vital Signs (Most Recent):  Temp: 96.4 °F (35.8 °C) (08/24/20 1241)  Pulse: 93 (08/24/20 1241)  Resp: 15 (08/24/20 1241)  BP: 127/84 (08/24/20 1241)  SpO2: 95 % (08/24/20 1241) Vital Signs (24h  Range):  Temp:  [96.4 °F (35.8 °C)-97.9 °F (36.6 °C)] 96.4 °F (35.8 °C)  Pulse:  [] 93  Resp:  [15-18] 15  SpO2:  [93 %-99 %] 95 %  BP: (127-177)/(72-84) 127/84     Weight: 97.1 kg (214 lb)  Body mass index is 33.52 kg/m².    Intake/Output Summary (Last 24 hours) at 8/24/2020 1500  Last data filed at 8/23/2020 2150  Gross per 24 hour   Intake 50 ml   Output 600 ml   Net -550 ml      Physical Exam  Vitals signs and nursing note reviewed.   Constitutional:       General: She is not in acute distress.     Appearance: She is obese.   HENT:      Head: Normocephalic and atraumatic.   Eyes:      Pupils: Pupils are equal, round, and reactive to light.   Neck:      Musculoskeletal: Normal range of motion.   Cardiovascular:      Rate and Rhythm: Normal rate and regular rhythm.      Pulses: Normal pulses.      Heart sounds: Normal heart sounds.   Pulmonary:      Effort: Pulmonary effort is normal.      Breath sounds: Normal breath sounds.   Abdominal:      General: Bowel sounds are normal.   Musculoskeletal:         General: No swelling.   Skin:     General: Skin is warm and dry.   Neurological:      Mental Status: She is alert.      Comments: Right UE weakness, 4/5 strength, aphasia   Psychiatric:         Behavior: Behavior normal.         Thought Content: Thought content normal.         Significant Labs: All pertinent labs within the past 24 hours have been reviewed.    Significant Imaging: I have reviewed all pertinent imaging results/findings within the past 24 hours.      Assessment/Plan:      * Cerebrovascular accident (CVA) due to embolism  Presents with aphasia and right side weakness 3/5  - now improving    Consult neurology   CT head nonacute  MRI of brain: Several foci of diffusion involving the left cerebral hemisphere (frontal, parietal, temporal lobes and left basal ganglia) in keeping with recent small infarcts, potentially embolic in nature.  CTA head and neck: Occlusion of the left proximal internal  carotid artery with collateral filling/reconstitution intracranially within the cavernous carotid portion.  Carotid US: occluded left ICA  Echo with bubble: patent PFO with + Matias's sign  Atorvastatin 40mg daily  ASA 81mg daily and plavix 75mg daily x3 months at least  - TSH wnl, EKG NSR, A1c 5.3, , B12 wnl  - PT/OT/SLP: recommend IPR      Hyperlipemia  Not on home statin  Lipid levels WNL with elevated triglyceride noted  Start atorvastatin 40mg      Hypertension   on arrival  Patient home meds: amplodipine/benazapril, aldactone; resume  - outside of permissive HTN window               VTE Risk Mitigation (From admission, onward)         Ordered     IP VTE LOW RISK PATIENT  Once      08/23/20 0016     Place sequential compression device  Until discontinued      08/23/20 0016                Discharge Planning   ANN-MARIE:      Code Status: Full Code   Is the patient medically ready for discharge?:     Reason for patient still in hospital (select all that apply): Other (specify) IPR placement; medically ready  Discharge Plan A: Home Health                  Peter Mar MD  Department of Hospital Medicine   [facility]

## 2020-08-24 NOTE — SUBJECTIVE & OBJECTIVE
Interval History: Weakness improving somewhat today. Has had extensive workup: carotid US concerning for left ICA occlusion; CTA looks chronic. Discussed with Vascular Neurology Dr. Hay who states no acute intervention would be beneficial due to chronic nature. Added on plavix.    TTE today + for PFO and with concern for RV strain. Checking CTA to r/o PE. Otherwise, awaiting IPR.    Review of Systems   Constitutional: Negative for fever.   Respiratory: Negative for shortness of breath.    Cardiovascular: Negative for chest pain.   Neurological: Positive for weakness. Negative for headaches.     Objective:     Vital Signs (Most Recent):  Temp: 96.4 °F (35.8 °C) (08/24/20 1241)  Pulse: 93 (08/24/20 1241)  Resp: 15 (08/24/20 1241)  BP: 127/84 (08/24/20 1241)  SpO2: 95 % (08/24/20 1241) Vital Signs (24h Range):  Temp:  [96.4 °F (35.8 °C)-97.9 °F (36.6 °C)] 96.4 °F (35.8 °C)  Pulse:  [] 93  Resp:  [15-18] 15  SpO2:  [93 %-99 %] 95 %  BP: (127-177)/(72-84) 127/84     Weight: 97.1 kg (214 lb)  Body mass index is 33.52 kg/m².    Intake/Output Summary (Last 24 hours) at 8/24/2020 1500  Last data filed at 8/23/2020 2150  Gross per 24 hour   Intake 50 ml   Output 600 ml   Net -550 ml      Physical Exam  Vitals signs and nursing note reviewed.   Constitutional:       General: She is not in acute distress.     Appearance: She is obese.   HENT:      Head: Normocephalic and atraumatic.   Eyes:      Pupils: Pupils are equal, round, and reactive to light.   Neck:      Musculoskeletal: Normal range of motion.   Cardiovascular:      Rate and Rhythm: Normal rate and regular rhythm.      Pulses: Normal pulses.      Heart sounds: Normal heart sounds.   Pulmonary:      Effort: Pulmonary effort is normal.      Breath sounds: Normal breath sounds.   Abdominal:      General: Bowel sounds are normal.   Musculoskeletal:         General: No swelling.   Skin:     General: Skin is warm and dry.   Neurological:      Mental Status: She is  alert.      Comments: Right UE weakness, 4/5 strength, aphasia   Psychiatric:         Behavior: Behavior normal.         Thought Content: Thought content normal.         Significant Labs: All pertinent labs within the past 24 hours have been reviewed.    Significant Imaging: I have reviewed all pertinent imaging results/findings within the past 24 hours.

## 2020-08-24 NOTE — PLAN OF CARE
08/24/20 1021   Post-Acute Status   Post-Acute Authorization Placement   Post-Acute Placement Status Referrals Sent  ([facility])

## 2020-08-24 NOTE — PLAN OF CARE
Problem: SLP Goal  Goal: SLP Goal  Description: Short Term Goals:  1. Pt will participate in BSS to determine least restrictive diet. - MET 8/23/20  2. Pt will tolerate regular textures/thin liquids PO diet with no overt s/s of aspiration.  3. Pt will participate in informal speech-lang eval to r/o cognitive-linguistic deficits. - MET 8/23/20  4. Pt will answer complex Y/N questions with 80% acc ind'ly  5. Pt will follow 2+ step commands with 80% acc ind'ly  6. Pt will complete word retrieval tasks with 80% acc with min cuing  7. Pt will complete higher level mental manipulation tasks (functional problem solving, reasoning, memory, sequencing, etc). with 80% acc with min-mod asst.   8. Pt will participate in OMEs x10 to improve oral motor function/strength   *further goals pending pt's progress*  Outcome: Ongoing, Progressing   Minimal verbalizations noted, pt with flat affect t/o session. Responded yes/no to very basic questions with delay noted.

## 2020-08-24 NOTE — PLAN OF CARE
CM met with pt and daughter at bedside.   Updated regarding [facility] and [facility] both reviewing referral for possible admission.  Once medically accepted, facility will request insurance authorization for admission.  Anticipate d/c tomorrow providing referral sent today.  Pt and daughter verbalized understanding, time allowed for questions.    Discharge planning brochure provided. White board updated with CM name & contact info.  Pt encouraged to call with any questions or needs. CM will continue to follow patient throughout the transitions of care, and assist with any discharge needs.    Inés Tay RN    [phone]

## 2020-08-24 NOTE — PLAN OF CARE
CM notified pt in need of inpt rehab, pt is in agreement per phone conversation.  Referrals sent to [facility] and [facility] per pt request.  Will await review decision.  Anticipate need for insurance authorization.    Inés Tay RN    [phone]

## 2020-08-24 NOTE — PLAN OF CARE
[facility] states referral received, under review.  Will notify CM with decision.

## 2020-08-24 NOTE — PLAN OF CARE
Problem: Physical Therapy Goal  Goal: Physical Therapy Goal  Description: Goals to be met by: 2020    Patient will increase functional independence with mobility by performin) Supine<>Sit with independence   2) Sit <>Stand with independence  3) Bed <>Chair with independence   4) Pt to ambulate 500 feet with independence   5) Pt to perform BLE X10 mod I with handout.           Outcome: Ongoing, Progressing   Continue working toward goals.

## 2020-08-24 NOTE — PT/OT/SLP PROGRESS
Speech Language Pathology Treatment    Patient Name:  Rosalia Fagan   MRN:  [id number]  Admitting Diagnosis: CVA (cerebral vascular accident)    Recommendations:        General Recommendations:  communication remediation, monitor with meal   Diet recommendations:  Regular, Thin   Aspiration Precautions: 1 bite/sip at a time, Alternating bites/sips, Frequent oral care, HOB to 90 degrees, Meds whole 1 at a time, Remain upright 30 minutes post meal, Small bites/sips and Standard aspiration precautions   General Precautions: Standard, fall  Communication strategies:  provide increased time to answer               Subjective     Pt seen in room. Pt found lying in bed, eyes closed.   Patient goals: none stated     Pain/Comfort:  · Pain Rating 1: 0/10    Objective:     Has the patient been evaluated by SLP for swallowing?   Yes  Keep patient NPO? No   Current Respiratory Status: room air      Communication: Pt seen in room for brief session, pt did not state any orientation info. Pt answered yes/no response to very basic questions, minimal verbalizations noted. Pt w/ significant delay in responding to questions and no attempt to state personal info. Pt did not attempt to follow commands. Pt noted to close eyes and turn away from SLP.   Cont ST at next level of care, unable to determine pt's baseline at this time.     Assessment:     Rosalia Fagan is a 59 y.o. female admitted with CVA (cerebral vascular accident).  She presents with R sided facial weakness/droop; however, pt demonstrates oral and pharyngeal phases of the swallow judged to be WFL -- no overt s/s of aspiration across given consistencies, per subjective observation. Pt also appears to demonstrate language (receptive and expressive) and cognitive deficits; however, limited participation this date and will require further assessment.     Goals:   Multidisciplinary Problems     SLP Goals        Problem: SLP Goal    Goal Priority Disciplines Outcome   SLP  Goal     SLP Ongoing, Progressing   Description: Short Term Goals:  1. Pt will participate in BSS to determine least restrictive diet. - MET 8/23/20  2. Pt will tolerate regular textures/thin liquids PO diet with no overt s/s of aspiration.  3. Pt will participate in informal speech-lang eval to r/o cognitive-linguistic deficits. - MET 8/23/20  4. Pt will answer complex Y/N questions with 80% acc ind'ly  5. Pt will follow 2+ step commands with 80% acc ind'ly  6. Pt will complete word retrieval tasks with 80% acc with min cuing  7. Pt will complete higher level mental manipulation tasks (functional problem solving, reasoning, memory, sequencing, etc). with 80% acc with min-mod asst.   8. Pt will participate in OMEs x10 to improve oral motor function/strength   *further goals pending pt's progress*                   Plan:     · Patient to be seen:  3 x/week   · Plan of Care expires:  09/23/20  · Plan of Care reviewed with:  patient   · SLP Follow-Up:  Yes       Discharge recommendations:  rehabilitation facility   Barriers to Discharge:  none    Time Tracking:     SLP Treatment Date:   08/24/20  Speech Start Time:  1037  Speech Stop Time:  1047     Speech Total Time (min):  10 min      Billable Minutes: Speech Therapy Individual 10      BINA Mata, CCC-SLP  08/24/2020

## 2020-08-24 NOTE — CONSULTS
"  [facility]  Adult Nutrition  Consult Note    SUMMARY     Recommendations    Recommendation:   1. Encourage intake at meals as tolerated.   2. Monitor need for supplements.   3. RD to follow up to monitor po intake and need for diet education    Goals:   Pt will consume at least 50-75% intake at meals by RD follow up  Nutrition Goal Status: new    Reason for Assessment  Reason For Assessment: consult(stroke pathway)  Diagnosis: stroke/CVA  Relevant Medical History: cholecystectomy, appendectomy, lumbar laminectomy, HTN, GERD, shingles, pancreatitis, COPD, DJD, spinal stenosis  General Information Comments: Pt started on Cardiac diet per SLP recs. Pt was out of the room at visit. Left heart healthy diet handouts at bedside. Dion 18-skin intact. Unable to assess NFPE at this time. Per BMI pt appears nourished. Noted 5lb weight loss since 2020.  Nutrition Discharge Planning: pt to d/c on Cardiac diet.    Nutrition Risk Screen  Nutrition Risk Screen: no indicators present    Nutrition/Diet History  Food Preferences: unable to assess  Spiritual, Cultural Beliefs, Jehovah's witness Practices, Values that Affect Care: no  Factors Affecting Nutritional Intake: None identified at this time    Anthropometrics  Temp: 98.1 °F (36.7 °C)  Height Method: Stated  Height: 5' 7" (170.2 cm)  Height (inches): 67 in  Weight Method: Bed Scale  Weight: 94.1 kg (207 lb 7.3 oz)  Weight (lb): 207.45 lb  Ideal Body Weight (IBW), Female: 135 lb  % Ideal Body Weight, Female (lb): 153.67 %  BMI (Calculated): 32.5  BMI Grade: 30 - 34.9- obesity - grade I  Usual Body Weight (UBW), k.2 kg  % Usual Body Weight: 98.02  % Weight Change From Usual Weight: -2.18 %     Lab/Procedures/Meds  Pertinent Labs Reviewed: reviewed  Pertinent Labs Comments: WNL  Pertinent Medications Reviewed: reviewed  Pertinent Medications Comments: aspirin    Estimated/Assessed Needs  Weight Used For Calorie Calculations: 94.1 kg (207 lb 7.3 " oz)  Energy Calorie Requirements (kcal): 1935  Energy Need Method: [place]-St Jeor(x1.25)  Protein Requirements: 61-73g (1.0-1.2g/kg)  Weight Used For Protein Calculations: 61.3 kg (135 lb 2.3 oz)(IBW)  Estimated Fluid Requirement Method: RDA Method  RDA Method (mL): 1935     Nutrition Prescription Ordered  Current Diet Order: Cardiac    Evaluation of Received Nutrient/Fluid Intake  I/O: 60/756  Energy Calories Required: meeting needs  Protein Required: meeting needs  Fluid Required: meeting needs  Comments: LBM 8/22  % Intake of Estimated Energy Needs: Other: intake not charted  % Meal Intake: Other: intake not charted    Nutrition Risk  Level of Risk/Frequency of Follow-up: (2xweekly)     Assessment and Plan  No nutrition dx at this time     Monitor and Evaluation  Food and Nutrient Intake: food and beverage intake  Food and Nutrient Adminstration: diet order  Physical Activity and Function: nutrition-related ADLs and IADLs  Anthropometric Measurements: weight  Biochemical Data, Medical Tests and Procedures: electrolyte and renal panel  Nutrition-Focused Physical Findings: overall appearance     Malnutrition Assessment  Unable to assess NFPE-pt out of room at visit        Nutrition Follow-Up    RD Follow-up?: Yes

## 2020-08-24 NOTE — PLAN OF CARE
Problem: Adult Inpatient Plan of Care  Goal: Plan of Care Review  Outcome: Ongoing, Progressing  Plan of care, labs and notes reviewed

## 2020-08-24 NOTE — PLAN OF CARE
[facility]    FACILITY TRANSFER ORDERS      Patient Name: Rosalia Fagan  YOB: 1960    PCP: Barbara Hameed NP   PCP Address: [address] / [place] [address]  PCP Phone Number: [phone]  PCP Fax: [phone]    Encounter Date: 08/27/2020    Admit to: inpatient rehab    Vital Signs:  Routine    Active Hospital Problems    Diagnosis  POA    *Cerebrovascular accident (CVA) due to embolism [I63.9]  Yes    Carotid occlusion, left [I65.22]  Yes    PFO (patent foramen ovale) [Q21.1]  Not Applicable    Stroke [I63.9]  Yes    Hypertension [I10]  Yes    Hyperlipemia [E78.5]  Yes      Resolved Hospital Problems   No resolved problems to display.         Allergies:Review of patient's allergies indicates:  No Known Allergies    Diet: cardiac diet    Activities: Activity as tolerated    Nursing:    - Aspiration precautions:             - Total assistance with meals            -  Upright 90 degrees befor during and after meals             -  Suction at bedside          - Fall precautions per nursing home protocol   - Seizure precaution per custodial protocol   - Decubitus precautions:        -  for positioning   - Pressure reducing foam mattress   - Turn patient every two hours. Use wedge pillows to anchor patient     Labs: per facility protocol    CONSULTS:    Physical Therapy to evaluate and treat. , Occupational Therapy to evaluate and treat. and Speech Therapy to evaluate and treat for Language, Swallowing and Cognition.    MISCELLANEOUS CARE:  Routine Skin for Bedridden Patients: Apply moisture barrier cream to all skin folds and wet areas in perineal area daily and after baths and all bowel movements.    WOUND CARE ORDERS  None    Medications: Review discharge medications with patient and family and provide education.         Current Discharge Medication List      START taking these medications    Details   amLODIPine (NORVASC) 10 MG tablet Take 1 tablet (10 mg  total) by mouth once daily.  Qty: 30 tablet, Refills: 11    Comments: .      aspirin (ECOTRIN) 81 MG EC tablet Take 1 tablet (81 mg total) by mouth once daily.  Qty: 30 tablet, Refills: 11      atorvastatin (LIPITOR) 40 MG tablet Take 1 tablet (40 mg total) by mouth once daily.  Qty: 90 tablet, Refills: 3      benazepriL (LOTENSIN) 20 MG tablet Take 1 tablet (20 mg total) by mouth once daily.  Qty: 90 tablet, Refills: 3    Comments: .      clopidogreL (PLAVIX) 75 mg tablet Take 1 tablet (75 mg total) by mouth once daily.  Qty: 30 tablet, Refills: 11      escitalopram oxalate (LEXAPRO) 5 MG Tab Take 1 tablet (5 mg total) by mouth once daily.  Qty: 30 tablet, Refills: 11         CONTINUE these medications which have CHANGED    Details   spironolactone (ALDACTONE) 25 MG tablet Take 1 tablet (25 mg total) by mouth once daily.  Qty: 30 tablet, Refills: 11    Comments: .         CONTINUE these medications which have NOT CHANGED    Details   cyanocobalamin (VITAMIN B-12) 1000 MCG tablet Take 1,000 mcg by mouth once daily.      cyclobenzaprine (FLEXERIL) 10 MG tablet TAKE 1 TABLET BY MOUTH THREE TIMES A DAY AS NEEDED FOR MUSCLE SPASMS  Qty: 90 tablet, Refills: 5    Associated Diagnoses: Spinal stenosis of lumbar region, unspecified whether neurogenic claudication present      multivitamin capsule Take 1 capsule by mouth once daily.         STOP taking these medications       ALPRAZolam (XANAX) 0.5 MG tablet Comments:   Reason for Stopping:         amlodipine-benazepril 10-20mg (LOTREL) 10-20 mg per capsule Comments:   Reason for Stopping:         estradiol 0.05 mg/24 hr td ptsw (VIVELLE-DOT) 0.05 mg/24 hr Comments:   Reason for Stopping:         gabapentin (NEURONTIN) 300 MG capsule Comments:   Reason for Stopping:         meloxicam (MOBIC) 15 MG tablet Comments:   Reason for Stopping:                   _________________________________  Peter Mar MD  08/24/2020

## 2020-08-24 NOTE — PLAN OF CARE
[facility] Plan of Care Note    CTA and MRI findings were discussed with vascular neurology per primary team and no intervention was deemed indicated. Stroke workup is almost completed - pending TTE read.    Stroke etiology is likely atheroembolic in nature.    - continue DAPT and statin  - would keep DAPT for at least three months  - f/u with vascular neurology  - avoid hypotension  - risk factor stratification  - please call with questions    Mike Dobbins MD  [facility] PGY4    ADDENDUM:    TTE shows intracardiac  Shunt. Likely this is incidental finding and not related to her current stroke.   Please continue care as per original plan.

## 2020-08-24 NOTE — PLAN OF CARE
Problem: Occupational Therapy Goal  Goal: Occupational Therapy Goal  Description: Goals to be met by: 09/23/2020     Patient will increase functional independence with ADLs by performing:    Feeding with Modified [place].  UE Dressing with Modified [place].  LE Dressing with Modified [place].  Grooming while standing at sink with Modified [place].  Toileting from toilet with Modified [place] for hygiene and clothing management.   Toilet transfer to toilet with Modified [place].    Outcome: Ongoing, Progressing     Patient presenting /c flat affect and minimal conversation (one word answers to questions). Patient AOX4, but increased time required for all questions. Decreased coordination and fluidity of RUE noted. Patient SB-CGA for gait, no AD. Patient would benefit from IPR to address functional deficits and improve performance in ADL tasks.

## 2020-08-24 NOTE — PT/OT/SLP PROGRESS
Occupational Therapy   Treatment    Name: Rosalia Fagan  MRN: [id number]  Admitting Diagnosis:  CVA (cerebral vascular accident)       Recommendations:     Discharge Recommendations: rehabilitation facility  Discharge Equipment Recommendations:  (Defer to IPR)  Barriers to discharge:  None    Assessment:   Patient presenting /c flat affect and minimal conversation (one word answers to questions). Patient AOX4, but increased time required for all questions. Decreased coordination and fluidity of RUE noted. Patient SB-CGA for gait, no AD. Patient would benefit from IPR to address functional deficits and improve performance in ADL tasks.     Rosalia Fagan is a 59 y.o. female with a medical diagnosis of CVA (cerebral vascular accident). Performance deficits affecting function are weakness, impaired endurance, impaired self care skills, impaired functional mobilty, gait instability, impaired balance, decreased upper extremity function, decreased ROM, impaired fine motor, impaired coordination, decreased safety awareness, decreased coordination.     Rehab Prognosis:  Good; patient would benefit from acute skilled OT services to address these deficits and reach maximum level of function.       Plan:     Patient to be seen 5 x/week to address the above listed problems via self-care/home management, therapeutic activities, therapeutic exercises  · Plan of Care Expires: 09/22/20  · Plan of Care Reviewed with: patient    Subjective     Pain/Comfort:  · Pain Rating 1: 0/10    Objective:     Communicated with: nurseLaura prior to session.  Patient found HOB elevated with bed alarm, telemetry upon OT entry to room.    General Precautions: Standard, fall   Orthopedic Precautions:N/A   Braces: N/A     Bed Mobility:    · Patient completed Rolling/Turning to Left with  supervision and with side rail  · Patient completed Scooting/Bridging with supervision  · Patient completed Supine to Sit with supervision and with side  "rail     Functional Mobility/Transfers:  · Patient completed Sit <> Stand Transfer with stand by assistance  with  no assistive device   · Patient completed Bed <> Chair Transfer using Step Transfer technique with stand by assistance with no assistive device  · Patient completed Toilet Transfer Step Transfer technique with contact guard assistance with  no AD    Activities of Daily Living:  · Grooming: stand by assistance to complete standing at sink; Set-up to open all items 2/2 poor RUE FMC  · Toileting: stand by assistance      [facility] 6 Click ADL: 18    Treatment & Education:  Patient /c very flat affect and responding /c one-word answers to questions. Bed mob as noted above. Ambulated around EOB to sink /c SB-CGA, no AD, slight sway to R. Patient required set-up to open all items 2/2 FMC, but able to complete oral care and facial hygiene SBA. Noted /c decreased R sh flexion, but later in session patient able to flex RUE overhead to reach on shelf (albeit uncoordinated and hitting hand on door). Patient completed toileting and t/f as above. Ambulated to bedside chair. Patient unable to describe any deficits or the way her RUE feels -- states "I don't know". Patient left in chair and advised to use call button for (A). Lab present for blood draw.    Patient left up in chair with all lines intact, call button in reach, chair alarm on and nurse notifiedEducation:      GOALS:   Multidisciplinary Problems     Occupational Therapy Goals        Problem: Occupational Therapy Goal    Goal Priority Disciplines Outcome Interventions   Occupational Therapy Goal     OT, PT/OT Ongoing, Progressing    Description: Goals to be met by: 09/23/2020     Patient will increase functional independence with ADLs by performing:    Feeding with Modified [place].  UE Dressing with Modified [place].  LE Dressing with Modified [place].  Grooming while standing at sink with Modified [place].  Toileting from toilet with " Modified [place] for hygiene and clothing management.   Toilet transfer to toilet with Modified [place].                     Time Tracking:     OT Date of Treatment: 08/24/20  OT Start Time: 0950  OT Stop Time: 1022  OT Total Time (min): 32 min    Billable Minutes:Self Care/Home Management 20  Therapeutic Activity 12    LINA Fofana  8/24/2020

## 2020-08-25 PROBLEM — I65.22 CAROTID OCCLUSION, LEFT: Status: ACTIVE | Noted: 2020-08-25

## 2020-08-25 PROBLEM — Q21.12 PFO (PATENT FORAMEN OVALE): Status: ACTIVE | Noted: 2020-08-25

## 2020-08-25 LAB — POCT GLUCOSE: 96 MG/DL (ref 70–110)

## 2020-08-25 PROCEDURE — 97535 SELF CARE MNGMENT TRAINING: CPT | Mod: CO

## 2020-08-25 PROCEDURE — 97116 GAIT TRAINING THERAPY: CPT | Mod: CQ

## 2020-08-25 PROCEDURE — 25000003 PHARM REV CODE 250: Performed by: NURSE PRACTITIONER

## 2020-08-25 PROCEDURE — 11000001 HC ACUTE MED/SURG PRIVATE ROOM

## 2020-08-25 PROCEDURE — 94761 N-INVAS EAR/PLS OXIMETRY MLT: CPT

## 2020-08-25 PROCEDURE — 25000003 PHARM REV CODE 250: Performed by: HOSPITALIST

## 2020-08-25 PROCEDURE — 97530 THERAPEUTIC ACTIVITIES: CPT | Mod: CQ

## 2020-08-25 PROCEDURE — 97530 THERAPEUTIC ACTIVITIES: CPT | Mod: CO

## 2020-08-25 RX ORDER — BENAZEPRIL HYDROCHLORIDE 20 MG/1
20 TABLET ORAL DAILY
Status: DISCONTINUED | OUTPATIENT
Start: 2020-08-26 | End: 2020-08-27 | Stop reason: HOSPADM

## 2020-08-25 RX ADMIN — ATORVASTATIN CALCIUM 40 MG: 40 TABLET, FILM COATED ORAL at 08:08

## 2020-08-25 RX ADMIN — BENAZEPRIL HYDROCHLORIDE 10 MG: 5 TABLET, COATED ORAL at 08:08

## 2020-08-25 RX ADMIN — AMLODIPINE BESYLATE 10 MG: 5 TABLET ORAL at 08:08

## 2020-08-25 RX ADMIN — CLOPIDOGREL 75 MG: 75 TABLET, FILM COATED ORAL at 08:08

## 2020-08-25 RX ADMIN — ASPIRIN 81 MG: 81 TABLET, COATED ORAL at 08:08

## 2020-08-25 RX ADMIN — SPIRONOLACTONE 25 MG: 25 TABLET ORAL at 08:08

## 2020-08-25 NOTE — PROGRESS NOTES
[facility] Medicine  Progress Note    Patient Name: Rosalia Fagan  MRN: [id number]  Patient Class: IP- Inpatient   Admission Date: 8/22/2020  Length of Stay: 1 days  Attending Physician: Nieves Velazquez MD  Primary Care Provider: Barbara Hameed NP        Subjective:     Principal Problem:Cerebrovascular accident (CVA) due to embolism        HPI:  Ms. Hendricks is a 59-year-old female with a history of hypertension, hyperlipidemia, COPD, chronic back pain who presented to [facility]  emergency department with c/o right-sided weakness for 2 days. She initially presented to [facility] ED for the same but refused to be transferred and left AMA. Head CT was negative. She has aphasia and decreased strength 3/5 in the right upper and lower extremity as well as a right sided facial droop. Her lab work revealed a mild troponin elevation, 0.030 and elevated triglycerides at 207. Vascular Neurology recommended further imaging and neurologic work-up. She was admitted to [facility] hospital medicine for further care.     Overview/Hospital Course:  No notes on file    Interval History:  Patient and son  No new complaints. Speech better  Right hand  better  Awaiting RHB insurance approval.  Her BP was running higher this AM and I did increase ACE      Review of Systems   Constitutional: Negative for chills and fever.   Respiratory: Negative for cough, shortness of breath and wheezing.    Cardiovascular: Negative for chest pain, palpitations and leg swelling.   Gastrointestinal: Negative for abdominal pain, blood in stool, constipation, diarrhea, nausea and vomiting.   Genitourinary: Negative for dysuria and hematuria.   Musculoskeletal: Negative for back pain.   Skin: Negative for rash.   Neurological: Positive for weakness (right side). Negative for dizziness and headaches.     Objective:     Vital Signs (Most Recent):  Temp: 97.9 °F (36.6 °C) (08/25/20 1151)  Pulse: 101  (08/25/20 1151)  Resp: 17 (08/25/20 1151)  BP: 125/76 (08/25/20 1151)  SpO2: (!) 94 % (08/25/20 1151) Vital Signs (24h Range):  Temp:  [96.1 °F (35.6 °C)-98.2 °F (36.8 °C)] 97.9 °F (36.6 °C)  Pulse:  [] 101  Resp:  [15-18] 17  SpO2:  [91 %-96 %] 94 %  BP: (125-176)/() 125/76     Weight: 97.5 kg (214 lb 15.2 oz)  Body mass index is 33.67 kg/m².    Intake/Output Summary (Last 24 hours) at 8/25/2020 1219  Last data filed at 8/25/2020 0800  Gross per 24 hour   Intake 100 ml   Output 1400 ml   Net -1300 ml      Physical Exam  Vitals signs and nursing note reviewed.   Constitutional:       General: She is not in acute distress.     Appearance: She is well-developed.   Eyes:      Conjunctiva/sclera: Conjunctivae normal.   Neck:      Vascular: No JVD.   Cardiovascular:      Rate and Rhythm: Normal rate and regular rhythm.      Heart sounds: Normal heart sounds.   Pulmonary:      Effort: Pulmonary effort is normal.      Breath sounds: Normal breath sounds. No wheezing.   Abdominal:      General: Bowel sounds are normal.      Palpations: Abdomen is soft.      Tenderness: There is no abdominal tenderness.   Musculoskeletal:         General: No tenderness.   Skin:     General: Skin is warm and dry.      Capillary Refill: Capillary refill takes less than 2 seconds.   Neurological:      Mental Status: She is alert and oriented to person, place, and time.      Comments: Right sided weakness . Handgrip. Weaker than on the left         Significant Labs:   BMP:   Recent Labs   Lab 08/24/20  0423   GLU 91      K 4.0      CO2 24   BUN 13   CREATININE 0.7   CALCIUM 9.4     CBC:   Recent Labs   Lab 08/24/20  0423   WBC 9.27   HGB 15.1   HCT 46.4          Significant Imaging: I have reviewed all pertinent imaging results/findings within the past 24 hours.     CTA Chest Non-Coronary  Narrative: EXAMINATION:  CTA CHEST NON CORONARY    CLINICAL HISTORY:  PE suspected, high pretest prob;    TECHNIQUE:  Low dose  axial images, sagittal and coronal reformations were obtained from the thoracic inlet to the lung bases following the IV administration of 100 mL of Omnipaque 350.  Contrast timing was optimized to evaluate the pulmonary arteries.  MIP images were performed.    COMPARISON:  02/25/2016    FINDINGS:  The structures at the base of the neck are grossly unremarkable.  No significant mediastinal lymphadenopathy.  The heart is not enlarged.  The thoracic aorta tapers normally noting atherosclerotic calcification in the distribution of the coronary arteries.  The visualized portions of the spleen, pancreas, liver and right adrenal gland are grossly unremarkable.  There is a nodule within the left adrenal gland measuring 1.7 cm, attenuation of which is nonspecific although may reflect adenoma.  There is a partially visualized low attenuating lesion arising from the upper pole of the left kidney measuring attenuation most suggestive of cyst.    The airways are patent.  Evaluation of the pulmonary parenchyma is limited secondary to motion artifact and artifact from patient's left arm.  There is subtle mosaic attenuation of the pulmonary parenchyma, could reflect sequela of small airways disease or small vessel disease.  No large focal consolidation.  No pneumothorax.  No pleural effusion.    Bolus timing is adequate for evaluation of pulmonary thromboembolism.  There is some limitation secondary to respiratory motion artifact.  Allowing for this, no convincing pulmonary arterial filling defect to the level of the segmental arteries bilaterally to suggest pulmonary thromboembolism.    Degenerative changes are noted of the spine.  No significant axillary lymphadenopathy.  Impression: 1. No pulmonary thromboembolism noting exam limitations above.  2. Pulmonary findings of mosaic attenuation is nonspecific, possibly reflecting early sequela of small airways disease or small vessel disease, no large focal consolidation.  3. Please  see above for additional findings.    Electronically signed by: Salo Garcia MD  Date:    08/24/2020  Time:    12:19  Echo Color Flow Doppler? Yes; Bubble Contrast? Yes  · Low normal left ventricular systolic function. The estimated ejection   fraction is 50%.  · Local segmental wall motion abnormalities.  · Concentric left ventricular remodeling.  · Indeterminate left ventricular diastolic function.  · Hyperdynamic right ventricular systolic function.  · Normal central venous pressure (3 mmHg).  · Patent foramen ovale present.  · Study is positive for an intracardiac shunt.  · Positive Matias's sign. If clinically indicated recommend evaluation   for acute pulmonary embolism.     MRI BRAIN WITHOUT CONTRAST  Narrative: EXAMINATION:  MRI BRAIN WITHOUT CONTRAST    CLINICAL HISTORY:  cva;    TECHNIQUE:  Multiplanar multisequence MR imaging of the brain was performed without contrast.    COMPARISON:  CTA head neck dated 08/23/2020    FINDINGS:  There are several foci of diffusion restriction of the left cerebral hemisphere involving the left frontal, left parietal, left temporal lobes, and left basal ganglia.  Foci distribution involve deep and subcortical white matter with few cortical foci of diffusion.  Foci demonstrate corresponding T2 FLAIR signal hyperintensity.  No midline shift, mass effect, or extra-axial collection.  Ventricles are similar in size.  Limited visualization of the left IC T2 flow void at the C2, C3 and portions of C4 segments in keeping with findings better demonstrated on comparison CTA.    There is mild mucosal thickening of the left sphenoid sinus.  No infiltrative T1 marrow signal.  Impression: Several foci of diffusion involving the left cerebral hemisphere (frontal, parietal, temporal lobes and left basal ganglia) in keeping with recent small infarcts, potentially embolic in nature.    This report was flagged in Epic as abnormal.    Electronically signed by: Billy  Lorenzo  Date:    08/24/2020  Time:    08:48           Assessment/Plan:      * Cerebrovascular accident (CVA) due to embolism  Presents with aphasia and right side weakness 3/5  - now improving      CT head nonacute  MRI of brain: Several foci of diffusion involving the left cerebral hemisphere (frontal, parietal, temporal lobes and left basal ganglia) in keeping with recent small infarcts, potentially embolic in nature.  CTA head and neck: Occlusion of the left proximal internal carotid artery with collateral filling/reconstitution intracranially within the cavernous carotid portion.  Carotid US: occluded left ICA  Echo with bubble: patent PFO with + Matias's sign  Atorvastatin 40mg daily  ASA 81mg daily and plavix 75mg daily x3 months at least  - TSH wnl, EKG NSR, A1c 5.3, , B12 wnl  - PT/OT/SLP: recommend IPR    Appreciate Neurology reccs  Awaiting for insurance approval for IPR    PFO (patent foramen ovale)  No immediate intervention. Will need follow up as OP      Carotid occlusion, left    Has collateral flow. No intervention at this point    Stroke    As above.    Hyperlipemia  Not on home statin  Lipid levels WNL with elevated triglyceride noted  Start atorvastatin 40mg      Hypertension  Presently on  Amlodipine, benazapril, aldactone  Increased Benazapril dose                 VTE Risk Mitigation (From admission, onward)         Ordered     IP VTE LOW RISK PATIENT  Once      08/23/20 0016     Place sequential compression device  Until discontinued      08/23/20 0016                Discharge Planning   ANN-MARIE:      Code Status: Full Code   Is the patient medically ready for discharge?: Yes    Reason for patient still in hospital (select all that apply): PT / OT recommendations   Awaiting insurance authorization for RHB  Discharge Plan A: Home Health                  Nieves Velazquez MD  Department of Hospital Medicine   [facility]

## 2020-08-25 NOTE — PLAN OF CARE
Patient is awake and orientedx4. Care plan explained. Telesitter Avasys placed at bedside, patient gets up without calling for help. Remains on room air. Sinus Rhythm to Sinus Tachycardia on the monitor, heart rate 80-low 100s. Neurochecks every 4 hours, right-sided weakness still noted. Ambulates to the bathroom with assist. Due medications given, swallows pill good.      No complaints of pain or shortness of breath.

## 2020-08-25 NOTE — PLAN OF CARE
Problem: Occupational Therapy Goal  Goal: Occupational Therapy Goal  Description: Goals to be met by: 09/23/2020     Patient will increase functional independence with ADLs by performing:    Feeding with Modified [place].  UE Dressing with Modified [place].  LE Dressing with Modified [place].  Grooming while standing at sink with Modified [place].  Toileting from toilet with Modified [place] for hygiene and clothing management.   Toilet transfer to toilet with Modified [place].    Outcome: Ongoing, Progressing     Patient /c flat affect and difficulty answering questions. Able to complete G/H tasks at sink but increased time and effort to incorporate RUE. Functional ambulation /c CGA and decreased balance, no arm swing and flexed posture. Recommend IPR to address functional deficits and improve performance in ADL tasks.

## 2020-08-25 NOTE — PLAN OF CARE
JORGE A contacted Sera at [facility] [phone] who states case has been presented to administrative team, awaiting final reply and will proceed with auth request.  Sera will contact pt for any questions.      Inés Tay RN    [phone]

## 2020-08-25 NOTE — PT/OT/SLP PROGRESS
Occupational Therapy   Treatment    Name: Rosalia Fagan  MRN: [id number]  Admitting Diagnosis:  Cerebrovascular accident (CVA) due to embolism       Recommendations:     Discharge Recommendations: rehabilitation facility  Discharge Equipment Recommendations:  (Defer to IPR)  Barriers to discharge:  None    Assessment:   Patient /c flat affect and difficulty answering questions. Able to complete G/H tasks at sink but increased time and effort to incorporate RUE. Functional ambulation /c CGA and decreased balance, no arm swing and flexed posture. Recommend IPR to address functional deficits and improve performance in ADL tasks.     Rosalia Fagan is a 59 y.o. female with a medical diagnosis of Cerebrovascular accident (CVA) due to embolism. Performance deficits affecting function are weakness, impaired endurance, impaired self care skills, impaired functional mobilty, gait instability, impaired balance, decreased upper extremity function, decreased lower extremity function, decreased ROM, decreased safety awareness, impaired cognition, impaired fine motor, impaired coordination.     Rehab Prognosis:  Good; patient would benefit from acute skilled OT services to address these deficits and reach maximum level of function.       Plan:     Patient to be seen 5 x/week to address the above listed problems via self-care/home management, therapeutic activities, therapeutic exercises  · Plan of Care Expires: 09/22/20  · Plan of Care Reviewed with: patient    Subjective     Pain/Comfort:  · Pain Rating 1: 0/10  · Pain Rating Post-Intervention 1: 0/10    Objective:     Communicated with: nurseLaura prior to session.  Patient found HOB elevated with bed alarm, telemetry upon OT entry to room.    General Precautions: Standard, aphasia, fall   Orthopedic Precautions:N/A   Braces: N/A     Bed Mobility:    · Patient completed Scooting/Bridging with stand by assistance  · Patient completed Supine to Sit with stand by  "assistance  · Patient completed Sit to Supine with stand by assistance     Functional Mobility/Transfers:  · Patient completed Sit <> Stand Transfer with stand by assistance  with  no assistive device     Activities of Daily Living:  · Grooming: stand by assistance and increased time to complete task using RUE    [facility] 6 Click ADL: 18    Treatment & Education:  When asked to lift RUE, patient /c limited range and unable to flex sh past ~45, but able to lift UE higher when standing at sink for hair grooming task. Patient able to mireille surgical mask mod indep /c increased time. Ambulated in klein /c CGA, no AD, slow consuelo, flexed posture and some lateral sway. When asked to describe how RUE feels and how ambulating feels, patient became frustrated and stated "I don't know".     Patient left HOB elevated with all lines intact, call button in reach, bed alarm on, nsg notified and family member presentEducation:      GOALS:   Multidisciplinary Problems     Occupational Therapy Goals        Problem: Occupational Therapy Goal    Goal Priority Disciplines Outcome Interventions   Occupational Therapy Goal     OT, PT/OT Ongoing, Progressing    Description: Goals to be met by: 09/23/2020     Patient will increase functional independence with ADLs by performing:    Feeding with Modified [place].  UE Dressing with Modified [place].  LE Dressing with Modified [place].  Grooming while standing at sink with Modified [place].  Toileting from toilet with Modified [place] for hygiene and clothing management.   Toilet transfer to toilet with Modified [place].                     Time Tracking:     OT Date of Treatment: 08/25/20  OT Start Time: 1110  OT Stop Time: 1133  OT Total Time (min): 23 min    Billable Minutes:Self Care/Home Management 8  Therapeutic Activity 15    LINA Fofana  8/25/2020    "

## 2020-08-25 NOTE — PT/OT/SLP PROGRESS
Physical Therapy Treatment    Patient Name:  Rosalia Fagan   MRN:  [id number]    Recommendations:     Discharge Recommendations:  rehabilitation facility   Discharge Equipment Recommendations: (defer to IPR)   Barriers to discharge: Decreased caregiver support and pt requires assist for gait at this time secondary to unsteadiness and decreased safety awareness.    Assessment:     Rosalia Fagan is a 59 y.o. female admitted with a medical diagnosis of Cerebrovascular accident (CVA) due to embolism.  She presents with the following impairments/functional limitations:  weakness, impaired endurance, impaired self care skills, impaired functional mobilty, gait instability, impaired balance, impaired cognition, decreased upper extremity function, decreased lower extremity function, decreased safety awareness, decreased ROM, impaired coordination.  Pt would continue to benefit from P.T. To address impairments listed above, and to assist pt's return to PLOF    Rehab Prognosis: Fair+; patient would benefit from acute skilled PT services to address these deficits and reach maximum level of function.    Recent Surgery: * No surgery found *      Plan:     During this hospitalization, patient to be seen 6 x/week to address the identified rehab impairments via gait training, therapeutic activities, therapeutic exercises, therapeutic groups and progress toward the following goals:    · Plan of Care Expires:  09/23/20    Subjective       Patient/Family Comments/goals: Pt agreed to tx.  Pain/Comfort:  · Pain Rating 1: 0/10  · Pain Rating Post-Intervention 1: 0/10      Objective:     Communicated with RN (Laura) prior to session.  Patient found supine with telemetry, bed alarm upon PT entry to room.     General Precautions: Standard, fall   Orthopedic Precautions:    Braces:       Functional Mobility:  · Bed Mobility:     · Rolling Left:  stand by assistance  · Scooting: stand by assistance, contact guard assistance and with  "vc's to EOB  · Supine to Sit: stand by assistance  · Transfers:     · Sit to Stand:  contact guard assistance with rolling walker and vc's for hand placement  · Gait: 50ft with RW and close CGA/Toby.  60ft x 3 without A.D. and HHA(close cGA) R hand. Standing rest breaks between bouts secondary to decreased strength and endurance with decline in quality of gait.  Pt ambulates with decreased consuelo, decreased step length. increased weight shift to the right with mild forward lean, increased right stance when compared to left, and decreased left hip flexion during swing phase.  Pt had one bout mild LOB that required Toby to recover during right stance phase with increased forward lean.    · Balance: sitting fair+, standing fair, gait fair-/fair close      AM-PAC 6 CLICK MOBILITY  Turning over in bed (including adjusting bedclothes, sheets and blankets)?: 4  Sitting down on and standing up from a chair with arms (e.g., wheelchair, bedside commode, etc.): 3  Moving from lying on back to sitting on the side of the bed?: 3  Moving to and from a bed to a chair (including a wheelchair)?: 3  Need to walk in hospital room?: 3  Climbing 3-5 steps with a railing?: 3  Basic Mobility Total Score: 19       Therapeutic Activities and Exercises:   Pt answers all questions during tx "Yes, No, Or I don't know."  When asked if she thinks she is ambulating better today, pt answered, "I don't know."  Pt has a flat affect and is mostly non-verbal during tx expect for above answers.  Pt sat EOB x 10 mins and performed BLE therex: AP, LAQ(improved R knee extension), hip flexion x 15 reps with close CGA/Toby at pt's shoulder to prevent increased trunk extension when performing hip flexion.  Pt sat in b/s chair after tx with all needs in reach.    Patient left up in chair with all lines intact, call button in reach, chair alarm on and RN notified..    GOALS:   Multidisciplinary Problems     Physical Therapy Goals        Problem: Physical " Therapy Goal    Goal Priority Disciplines Outcome Goal Variances Interventions   Physical Therapy Goal     PT, PT/OT Ongoing, Progressing     Description: Goals to be met by: 2020    Patient will increase functional independence with mobility by performin) Supine<>Sit with independence   2) Sit <>Stand with independence  3) Bed <>Chair with independence   4) Pt to ambulate 500 feet with independence   5) Pt to perform BLE X10 mod I with handout.                            Time Tracking:     PT Received On: 20  PT Start Time: 1400     PT Stop Time: 1424  PT Total Time (min): 24 min     Billable Minutes: Gait Training 14 and Therapeutic Activity 10    Treatment Type: Treatment  PT/PTA: PTA     PTA Visit Number: 2     Shantal Greco PTA  2020

## 2020-08-25 NOTE — ASSESSMENT & PLAN NOTE
Presents with aphasia and right side weakness 3/5  - now improving      CT head nonacute  MRI of brain: Several foci of diffusion involving the left cerebral hemisphere (frontal, parietal, temporal lobes and left basal ganglia) in keeping with recent small infarcts, potentially embolic in nature.  CTA head and neck: Occlusion of the left proximal internal carotid artery with collateral filling/reconstitution intracranially within the cavernous carotid portion.  Carotid US: occluded left ICA  Echo with bubble: patent PFO with + Matias's sign  Atorvastatin 40mg daily  ASA 81mg daily and plavix 75mg daily x3 months at least  - TSH wnl, EKG NSR, A1c 5.3, , B12 wnl  - PT/OT/SLP: recommend IPR    Appreciate Neurology reccs  Awaiting for insurance approval for IPR

## 2020-08-25 NOTE — PLAN OF CARE
VN rounds: VN cued into pt's room with pt's permission. Pt resting in bed. Son at bedside. VN instructed pt to call for assistance. Pt aware and agreeable. Allowed time for questions. Denies pain. NAD noted.     Patient's progress notes, labs, and care plan reviewed. Will cont to be available as needed.

## 2020-08-25 NOTE — SUBJECTIVE & OBJECTIVE
Interval History:  Patient and son  No new complaints. Speech better  Right hand  better  Awaiting [facility] insurance approval.  Her BP was running higher this AM and I did increase ACE      Review of Systems   Constitutional: Negative for chills and fever.   Respiratory: Negative for cough, shortness of breath and wheezing.    Cardiovascular: Negative for chest pain, palpitations and leg swelling.   Gastrointestinal: Negative for abdominal pain, blood in stool, constipation, diarrhea, nausea and vomiting.   Genitourinary: Negative for dysuria and hematuria.   Musculoskeletal: Negative for back pain.   Skin: Negative for rash.   Neurological: Positive for weakness (right side). Negative for dizziness and headaches.     Objective:     Vital Signs (Most Recent):  Temp: 97.9 °F (36.6 °C) (08/25/20 1151)  Pulse: 101 (08/25/20 1151)  Resp: 17 (08/25/20 1151)  BP: 125/76 (08/25/20 1151)  SpO2: (!) 94 % (08/25/20 1151) Vital Signs (24h Range):  Temp:  [96.1 °F (35.6 °C)-98.2 °F (36.8 °C)] 97.9 °F (36.6 °C)  Pulse:  [] 101  Resp:  [15-18] 17  SpO2:  [91 %-96 %] 94 %  BP: (125-176)/() 125/76     Weight: 97.5 kg (214 lb 15.2 oz)  Body mass index is 33.67 kg/m².    Intake/Output Summary (Last 24 hours) at 8/25/2020 1219  Last data filed at 8/25/2020 0800  Gross per 24 hour   Intake 100 ml   Output 1400 ml   Net -1300 ml      Physical Exam  Vitals signs and nursing note reviewed.   Constitutional:       General: She is not in acute distress.     Appearance: She is well-developed.   Eyes:      Conjunctiva/sclera: Conjunctivae normal.   Neck:      Vascular: No JVD.   Cardiovascular:      Rate and Rhythm: Normal rate and regular rhythm.      Heart sounds: Normal heart sounds.   Pulmonary:      Effort: Pulmonary effort is normal.      Breath sounds: Normal breath sounds. No wheezing.   Abdominal:      General: Bowel sounds are normal.      Palpations: Abdomen is soft.      Tenderness: There is no abdominal tenderness.    Musculoskeletal:         General: No tenderness.   Skin:     General: Skin is warm and dry.      Capillary Refill: Capillary refill takes less than 2 seconds.   Neurological:      Mental Status: She is alert and oriented to person, place, and time.      Comments: Right sided weakness . Handgrip. Weaker than on the left         Significant Labs:   BMP:   Recent Labs   Lab 08/24/20  0423   GLU 91      K 4.0      CO2 24   BUN 13   CREATININE 0.7   CALCIUM 9.4     CBC:   Recent Labs   Lab 08/24/20 0423   WBC 9.27   HGB 15.1   HCT 46.4          Significant Imaging: I have reviewed all pertinent imaging results/findings within the past 24 hours.     CTA Chest Non-Coronary  Narrative: EXAMINATION:  CTA CHEST NON CORONARY    CLINICAL HISTORY:  PE suspected, high pretest prob;    TECHNIQUE:  Low dose axial images, sagittal and coronal reformations were obtained from the thoracic inlet to the lung bases following the IV administration of 100 mL of Omnipaque 350.  Contrast timing was optimized to evaluate the pulmonary arteries.  MIP images were performed.    COMPARISON:  02/25/2016    FINDINGS:  The structures at the base of the neck are grossly unremarkable.  No significant mediastinal lymphadenopathy.  The heart is not enlarged.  The thoracic aorta tapers normally noting atherosclerotic calcification in the distribution of the coronary arteries.  The visualized portions of the spleen, pancreas, liver and right adrenal gland are grossly unremarkable.  There is a nodule within the left adrenal gland measuring 1.7 cm, attenuation of which is nonspecific although may reflect adenoma.  There is a partially visualized low attenuating lesion arising from the upper pole of the left kidney measuring attenuation most suggestive of cyst.    The airways are patent.  Evaluation of the pulmonary parenchyma is limited secondary to motion artifact and artifact from patient's left arm.  There is subtle mosaic  attenuation of the pulmonary parenchyma, could reflect sequela of small airways disease or small vessel disease.  No large focal consolidation.  No pneumothorax.  No pleural effusion.    Bolus timing is adequate for evaluation of pulmonary thromboembolism.  There is some limitation secondary to respiratory motion artifact.  Allowing for this, no convincing pulmonary arterial filling defect to the level of the segmental arteries bilaterally to suggest pulmonary thromboembolism.    Degenerative changes are noted of the spine.  No significant axillary lymphadenopathy.  Impression: 1. No pulmonary thromboembolism noting exam limitations above.  2. Pulmonary findings of mosaic attenuation is nonspecific, possibly reflecting early sequela of small airways disease or small vessel disease, no large focal consolidation.  3. Please see above for additional findings.    Electronically signed by: Salo Garcia MD  Date:    08/24/2020  Time:    12:19  Echo Color Flow Doppler? Yes; Bubble Contrast? Yes  · Low normal left ventricular systolic function. The estimated ejection   fraction is 50%.  · Local segmental wall motion abnormalities.  · Concentric left ventricular remodeling.  · Indeterminate left ventricular diastolic function.  · Hyperdynamic right ventricular systolic function.  · Normal central venous pressure (3 mmHg).  · Patent foramen ovale present.  · Study is positive for an intracardiac shunt.  · Positive Matias's sign. If clinically indicated recommend evaluation   for acute pulmonary embolism.     MRI BRAIN WITHOUT CONTRAST  Narrative: EXAMINATION:  MRI BRAIN WITHOUT CONTRAST    CLINICAL HISTORY:  cva;    TECHNIQUE:  Multiplanar multisequence MR imaging of the brain was performed without contrast.    COMPARISON:  CTA head neck dated 08/23/2020    FINDINGS:  There are several foci of diffusion restriction of the left cerebral hemisphere involving the left frontal, left parietal, left temporal lobes, and left  basal ganglia.  Foci distribution involve deep and subcortical white matter with few cortical foci of diffusion.  Foci demonstrate corresponding T2 FLAIR signal hyperintensity.  No midline shift, mass effect, or extra-axial collection.  Ventricles are similar in size.  Limited visualization of the left IC T2 flow void at the C2, C3 and portions of C4 segments in keeping with findings better demonstrated on comparison CTA.    There is mild mucosal thickening of the left sphenoid sinus.  No infiltrative T1 marrow signal.  Impression: Several foci of diffusion involving the left cerebral hemisphere (frontal, parietal, temporal lobes and left basal ganglia) in keeping with recent small infarcts, potentially embolic in nature.    This report was flagged in Epic as abnormal.    Electronically signed by: Billy Ventura  Date:    08/24/2020  Time:    08:48

## 2020-08-26 ENCOUNTER — CLINICAL SUPPORT (OUTPATIENT)
Dept: SMOKING CESSATION | Facility: CLINIC | Age: 60
End: 2020-08-26
Payer: COMMERCIAL

## 2020-08-26 DIAGNOSIS — F17.210 CIGARETTE SMOKER: Primary | ICD-10-CM

## 2020-08-26 PROCEDURE — 99407 PR TOBACCO USE CESSATION INTENSIVE >10 MINUTES: ICD-10-PCS | Mod: S$GLB,,,

## 2020-08-26 PROCEDURE — 25000003 PHARM REV CODE 250: Performed by: HOSPITALIST

## 2020-08-26 PROCEDURE — 97110 THERAPEUTIC EXERCISES: CPT | Mod: CQ

## 2020-08-26 PROCEDURE — 97530 THERAPEUTIC ACTIVITIES: CPT | Mod: CO

## 2020-08-26 PROCEDURE — 11000001 HC ACUTE MED/SURG PRIVATE ROOM

## 2020-08-26 PROCEDURE — 92507 TX SP LANG VOICE COMM INDIV: CPT

## 2020-08-26 PROCEDURE — 97803 MED NUTRITION INDIV SUBSEQ: CPT

## 2020-08-26 PROCEDURE — 25000003 PHARM REV CODE 250: Performed by: INTERNAL MEDICINE

## 2020-08-26 PROCEDURE — 99999 PR PBB SHADOW E&M-EST. PATIENT-LVL I: CPT | Mod: PBBFAC,,,

## 2020-08-26 PROCEDURE — 97535 SELF CARE MNGMENT TRAINING: CPT | Mod: CO

## 2020-08-26 PROCEDURE — 25000003 PHARM REV CODE 250: Performed by: NURSE PRACTITIONER

## 2020-08-26 PROCEDURE — 99999 PR PBB SHADOW E&M-EST. PATIENT-LVL I: ICD-10-PCS | Mod: PBBFAC,,,

## 2020-08-26 PROCEDURE — 99407 BEHAV CHNG SMOKING > 10 MIN: CPT | Mod: S$GLB,,,

## 2020-08-26 PROCEDURE — 97116 GAIT TRAINING THERAPY: CPT | Mod: CQ

## 2020-08-26 PROCEDURE — 94761 N-INVAS EAR/PLS OXIMETRY MLT: CPT

## 2020-08-26 RX ADMIN — SPIRONOLACTONE 25 MG: 25 TABLET ORAL at 08:08

## 2020-08-26 RX ADMIN — BENAZEPRIL HYDROCHLORIDE 20 MG: 20 TABLET, COATED ORAL at 08:08

## 2020-08-26 RX ADMIN — ASPIRIN 81 MG: 81 TABLET, COATED ORAL at 08:08

## 2020-08-26 RX ADMIN — CLOPIDOGREL 75 MG: 75 TABLET, FILM COATED ORAL at 08:08

## 2020-08-26 RX ADMIN — AMLODIPINE BESYLATE 10 MG: 5 TABLET ORAL at 08:08

## 2020-08-26 RX ADMIN — ATORVASTATIN CALCIUM 40 MG: 40 TABLET, FILM COATED ORAL at 08:08

## 2020-08-26 NOTE — PLAN OF CARE
[facility], [facility] facility did request auth but so far no authorization has yet been received.  Pt cannot be admitted until auth received, will again reiterate to pt and family.      Inés Tay RN    [phone]

## 2020-08-26 NOTE — PT/OT/SLP PROGRESS
"Speech Language Pathology Treatment    Patient Name:  Rosalia Fagan   MRN:  [id number]  Admitting Diagnosis: Cerebrovascular accident (CVA) due to embolism    Recommendations:     General Recommendations:  language. communication              Remediation   Diet recommendations:  Regular, Thin   Aspiration Precautions: 1 bite/sip at a time, Alternating bites/sips, Frequent oral care, HOB to 90 degrees, Meds whole 1 at a time, Remain upright 30 minutes post meal, Small bites/sips and Standard aspiration precautions   General Precautions: Standard, fall  Communication strategies:  provide increased time to answer               Subjective     Pt seen in room, she was lying down, eyes closed.   Patient goals: none stated     Pain/Comfort:  Pain Rating 1: (did not state)    Objective:     Has the patient been evaluated by SLP for swallowing?   Yes  Keep patient NPO? No   Current Respiratory Status: room air      Communication/language: Pt found in room, she was noted with eyes closed. Pt required max cues to participate. SLP did elevate HOB for pt to attend to SLP. Pt finally opened eyes with max cues. Pt did state name, did state hospital and year. Pt required max cues to state address. Pt could not recall why she was here. Pt did participate in automatics by counting up to 25 with starter cues. Able to produce JESS and STANISLAW with starter cues. Hesitations were noted during midway activity. (Pt appeared to lose her focus or what she was doing).     Pt did respond to very basic yes/ no questions w/ some delay noted. Pt noted to become agitated as more questions asked. Pt finally stated "I don't know, I don't know." Pt closed eyes and began to turn away from SLP.   SLP attempted to re-engage pt with no success.     Assessment:     Rosalia Fagan is a 59 y.o. female admitted with CVA with an SLP diagnosis of aphasia, delayed processing and impaired cognition, dcr'd safety awareness is noted.     Goals: "   Multidisciplinary Problems     SLP Goals        Problem: SLP Goal    Goal Priority Disciplines Outcome   SLP Goal     SLP Ongoing, Progressing   Description: Short Term Goals:  1. Pt will participate in BSS to determine least restrictive diet. - MET 8/23/20  2. Pt will tolerate regular textures/thin liquids PO diet with no overt s/s of aspiration.  3. Pt will participate in informal speech-lang eval to r/o cognitive-linguistic deficits. - MET 8/23/20  4. Pt will answer complex Y/N questions with 80% acc ind'ly  5. Pt will follow 2+ step commands with 80% acc ind'ly  6. Pt will complete word retrieval tasks with 80% acc with min cuing  7. Pt will complete higher level mental manipulation tasks (functional problem solving, reasoning, memory, sequencing, etc). with 80% acc with min-mod asst.   8. Pt will participate in OMEs x10 to improve oral motor function/strength   *further goals pending pt's progress*                   Plan:     · Patient to be seen:  3 x/week   · Plan of Care expires:  09/23/20  · Plan of Care reviewed with:  patient   · SLP Follow-Up:  Yes       Discharge recommendations:  rehabilitation facility   Barriers to Discharge:  none    Time Tracking:     SLP Treatment Date:   08/26/20  Speech Start Time:  0755  Speech Stop Time:  0810     Speech Total Time (min):  15 min    Billable Minutes: Speech Therapy Individual 15    BINA Mata, CCC-SLP  08/26/2020

## 2020-08-26 NOTE — PLAN OF CARE
Problem: Physical Therapy Goal  Goal: Physical Therapy Goal  Description: Goals to be met by: 2020    Patient will increase functional independence with mobility by performin) Supine<>Sit with independence   2) Sit <>Stand with independence  3) Bed <>Chair with independence   4) Pt to ambulate 500 feet with independence   5) Pt to perform BLE X10 mod I with handout.           Outcome: Ongoing, Progressing   Pt continued to work to achieve all established goals. Refused to ambulate with RW initially. Pt very unstable when ambulating without AD grabbing onto counter at nurses station . RW given to pt for increased safety and stability and able to ambulate ~150 ft with RW and requiring CGA/min A.

## 2020-08-26 NOTE — PLAN OF CARE
Problem: SLP Goal  Goal: SLP Goal  Description: Short Term Goals:  1. Pt will participate in BSS to determine least restrictive diet. - MET 8/23/20  2. Pt will tolerate regular textures/thin liquids PO diet with no overt s/s of aspiration.  3. Pt will participate in informal speech-lang eval to r/o cognitive-linguistic deficits. - MET 8/23/20  4. Pt will answer complex Y/N questions with 80% acc ind'ly  5. Pt will follow 2+ step commands with 80% acc ind'ly  6. Pt will complete word retrieval tasks with 80% acc with min cuing  7. Pt will complete higher level mental manipulation tasks (functional problem solving, reasoning, memory, sequencing, etc). with 80% acc with min-mod asst.   8. Pt will participate in OMEs x10 to improve oral motor function/strength   *further goals pending pt's progress*  Outcome: Ongoing, Progressing   Pt with dcr'd verbalizations for open ended questions. Still flat affect and minimal eye contact noted.

## 2020-08-26 NOTE — PLAN OF CARE
Pt stable. NO distress noted. POC reviewed with pt. Pt verbalized understanding. Pt remains SR pn the monitor. NO true red alarms noted. Pt denied pain. Flat affect the entire shift. Right sided weakness noted to upper extremities. Fall precautions maintained. Bed in lowest position, call light in reach and bed alarm on.

## 2020-08-26 NOTE — NURSING
CASE MANAGEMENT UPDATE  Following up on  Rehab case, called [facility] Case Management  Dept [phone] and spoke to Edwin MARTINEZ about case for this patient to go to Rehab, she states the Case Number is #[id number],  The case is pending and would like to have additional clinical information sent to [phone] .  Inés BLANDON Informed of above.

## 2020-08-26 NOTE — PROGRESS NOTES
Individual Follow-Up Form    8/26/2020    Quit Date: To be determined    Clinical Status of Patient: Inpatient    Length of Service: 30 minutes    Comments: Smoking cessation education provided.  Pt is a 0.5 pk/day smoker x 45 years. She denies nicotine withdrawal symptoms. Pt  states that she is ready to quit- enrolled in the [employer] during this encounter.      Diagnosis: F17.210    Next Visit:  Ambulatory referral to Smoking Cessation program following hospital discharge.

## 2020-08-26 NOTE — PT/OT/SLP PROGRESS
Physical Therapy Treatment    Patient Name:  Rosalia Fagan   MRN:  [id number]    Recommendations:     Discharge Recommendations:  rehabilitation facility   Discharge Equipment Recommendations: (Defer to Rehab)   Barriers to discharge: Decreased caregiver support    Assessment:     Rosalia Fagan is a 59 y.o. female admitted with a medical diagnosis of Cerebrovascular accident (CVA) due to embolism.  She presents with the following impairments/functional limitations:  weakness, impaired endurance, impaired self care skills, impaired functional mobilty, gait instability, impaired balance, impaired cognition, decreased coordination, decreased upper extremity function, decreased lower extremity function, decreased safety awareness, decreased ROM, impaired coordination, edema. Pt able to perform ambulation training ~20 ft without RW with pt demonstrating unsteadiness and loss of balance. RW given to pt and she ambulated ~40 ft stating she did not want to use the RW. RW taken and pt ambulated ~20 ft demonstrating unsteadiness and loss of balance once again grabbing onto nurses station counter. Pt given RW once again and able to ambulate ~150 ft with RW requiring CGA/Min A. Pt remains with flat affect and only answering questions with a yes/no response. Would benefit from continued PT services to increase pt's out of bed therapeutic activity and exercise, and her safety awareness for out of bed functional mobility.    Rehab Prognosis: Good and Fait+; patient would benefit from acute skilled PT services to address these deficits and reach maximum level of function.    Recent Surgery: * No surgery found *      Plan:     During this hospitalization, patient to be seen 6 x/week to address the identified rehab impairments via gait training, therapeutic activities, therapeutic exercises, neuromuscular re-education and progress toward the following goals:    · Plan of Care Expires:  09/23/20    Subjective     Chief  "Complaint: None Expressed  Patient/Family Comments/goals: None Expressed  Pain/Comfort:  · Pain Rating 1: 0/10(Did not complain of pain)  · Pain Rating Post-Intervention 1: 0/10      Objective:     Communicated with nurse prior to session.  Patient found supine with bed alarm, peripheral IV, telemetry upon PT entry to room.     General Precautions: Standard, fall   Orthopedic Precautions:    Braces: N/A     Functional Mobility:  · Bed Mobility:     · Rolling Left:  stand by assistance  · Scooting: stand by assistance and  To EOB  · Supine to Sit: stand by assistance  · Sit to Supine: stand by assistance  · Transfers:     · Sit to Stand:  contact guard assistance with no AD and rolling walker  · Gait: ~20 ft wirthout RW, ~40 ft with RW, ~20 ft without RW, and ~150 ft with RW. Without RW requiried min A and mod A to recover from loss of balance, and CGA/min A with RW      AM-PAC 6 CLICK MOBILITY  Turning over in bed (including adjusting bedclothes, sheets and blankets)?: 4  Sitting down on and standing up from a chair with arms (e.g., wheelchair, bedside commode, etc.): 3  Moving from lying on back to sitting on the side of the bed?: 3  Moving to and from a bed to a chair (including a wheelchair)?: 3  Need to walk in hospital room?: 3  Climbing 3-5 steps with a railing?: 3  Basic Mobility Total Score: 19       Therapeutic Activities and Exercises:   Pt performed seated therapeutic exercises 1 x 10 reps BLE's consisting of hip ADD/ABD, LAQ's, and hip/knee flexion(marches). Required verbal, tactile, and at times visual cueing to continue with task/movement. Ambulation training: Pt refused to use RW initially. Ambulated ~20 ft without RW with pt unsteady and swaying. RW given to pt and proceeded to ambulate ~40 ft when she stated, "I don't want", referring to RW. Ambulated ~20 ft with increased unsteadiness and swaying at which time pt grabbed for nurses station counter secondary to loss of balance. Reiterated to pt and " pt's son who was walking with PTA and pt that using the RW would increase her safety and stability. Son agreed with pt reluctantly agreeing and RW given to pt who ambulated an additional ~150 ft requiring CGA/min A plus verbal cueing for proper RW use.     Patient left supine with all lines intact, call button in reach, bed alarm on,  nurse notified and  son present..    GOALS:   Multidisciplinary Problems     Physical Therapy Goals        Problem: Physical Therapy Goal    Goal Priority Disciplines Outcome Goal Variances Interventions   Physical Therapy Goal     PT, PT/OT Ongoing, Progressing     Description: Goals to be met by: 2020    Patient will increase functional independence with mobility by performin) Supine<>Sit with independence   2) Sit <>Stand with independence  3) Bed <>Chair with independence   4) Pt to ambulate 500 feet with independence   5) Pt to perform BLE X10 mod I with handout.                            Time Tracking:     PT Received On: 20  PT Start Time: 909     PT Stop Time: 934  PT Total Time (min): 25 min     Billable Minutes: Gait Training  15 and Therapeutic Exercise  10    Treatment Type: Treatment  PT/PTA: PTA     PTA Visit Number: 3     Cindi Barnes, PTA  2020

## 2020-08-26 NOTE — PROGRESS NOTES
"[facility]  Adult Nutrition  Progress Note    SUMMARY       Recommendations    Recommendation:   1. Continue to encourage intake at meals as tolerated.   2. RD to follow to monitor po intake    Goals:   Pt will consume at least 50-75% intake at meals by RD follow up  Nutrition Goal Status: progressing towards goal    Reason for Assessment  Reason For Assessment: RD follow-up  Diagnosis: stroke/CVA  Relevant Medical History: cholecystectomy, appendectomy, lumbar laminectomy, HTN, GERD, shingles, pancreatitis, COPD, DJD, spinal stenosis  General Information Comments: Pt on Cardiac diet. Reports fair intake at meals. Noted poor intake of breakfast 2/2 dislikes hospital food. Dion 19-skin intact. NFPE completed today -nourished at this time  Nutrition Discharge Planning: pt to d/c on Cardiac diet.    Nutrition Risk Screen  Nutrition Risk Screen: no indicators present    Nutrition/Diet History  Food Preferences: no Yazdanism or cutural food prefs identfied  Spiritual, Cultural Beliefs, Scientologist Practices, Values that Affect Care: no  Factors Affecting Nutritional Intake: None identified at this time    Anthropometrics  Temp: 97.4 °F (36.3 °C)  Height Method: Stated  Height: 5' 7" (170.2 cm)  Height (inches): 67 in  Weight Method: Bed Scale  Weight: 97.5 kg (214 lb 15.2 oz)  Weight (lb): 214.95 lb  Ideal Body Weight (IBW), Female: 135 lb  % Ideal Body Weight, Female (lb): 158.52 %  BMI (Calculated): 33.7  BMI Grade: 30 - 34.9- obesity - grade I  Usual Body Weight (UBW), k.2 kg  % Usual Body Weight: 98.02  % Weight Change From Usual Weight: -2.18 %     Lab/Procedures/Meds  Pertinent Labs Reviewed: reviewed  Pertinent Labs Comments: WNL  Pertinent Medications Reviewed: reviewed  Pertinent Medications Comments: aspirin    Estimated/Assessed Needs  Weight Used For Calorie Calculations: 97.5 kg (214 lb 15.2 oz)  Energy Calorie Requirements (kcal):   Energy Need Method: Cocke-St " Tera(x1.25)  Protein Requirements: 61-73g (1.0-1.2g/kg)  Weight Used For Protein Calculations: 61.3 kg (135 lb 2.3 oz)(IBW)  Estimated Fluid Requirement Method: RDA Method  RDA Method (mL): 1977     Nutrition Prescription Ordered  Current Diet Order: Cardiac    Evaluation of Received Nutrient/Fluid Intake  I/O: 0/750  Energy Calories Required: meeting needs  Protein Required: meeting needs  Fluid Required: meeting needs  Comments: LBM 8/25  % Intake of Estimated Energy Needs: 25 - 50 %  % Meal Intake: 25 - 50 %    Nutrition Risk  Level of Risk/Frequency of Follow-up: (2xweekly)     Assessment and Plan  No nutrition dx at this time     Monitor and Evaluation  Food and Nutrient Intake: food and beverage intake  Food and Nutrient Adminstration: diet order  Physical Activity and Function: nutrition-related ADLs and IADLs  Anthropometric Measurements: weight  Biochemical Data, Medical Tests and Procedures: electrolyte and renal panel  Nutrition-Focused Physical Findings: overall appearance     Malnutrition Assessment  Orbital Region (Subcutaneous Fat Loss): well nourished  Upper Arm Region (Subcutaneous Fat Loss): well nourished       Nutrition Follow-Up  RD Follow-up?: Yes

## 2020-08-26 NOTE — PLAN OF CARE
Recommendation:   1. Continue to encourage intake at meals as tolerated.   2. RD to follow to monitor po intake    Goals:   Pt will consume at least 50-75% intake at meals by RD follow up  Nutrition Goal Status: progressing towards goal

## 2020-08-26 NOTE — PLAN OF CARE
Problem: Occupational Therapy Goal  Goal: Occupational Therapy Goal  Description: Goals to be met by: 09/23/2020     Patient will increase functional independence with ADLs by performing:    Feeding with Modified [place].  UE Dressing with Modified [place].  LE Dressing with Modified [place].  Grooming while standing at sink with Modified [place].  Toileting from toilet with Modified [place] for hygiene and clothing management.   Toilet transfer to toilet with Modified [place].    Outcome: Ongoing, Progressing     Patient remains /c flat affect, somewhat labile at end of session. Remains /c impaired proprioception and dysmetria in RUE. Patient remains appropriate for IPR to address functional deficits and improve performance in ADL tasks.

## 2020-08-26 NOTE — PLAN OF CARE
Patient resting in bed Alert and oriented x4.  Ambulates with one assist.  BM 8/25/2020. All questions and concerns addressed

## 2020-08-26 NOTE — SUBJECTIVE & OBJECTIVE
Interval History: no complaints  Improved strength on right arm but still somewhat spastic  Awaiting insurance approval    Review of Systems   Constitutional: Negative for chills and fever.   Respiratory: Negative for cough, shortness of breath and wheezing.    Cardiovascular: Negative for chest pain, palpitations and leg swelling.   Gastrointestinal: Negative for abdominal pain, blood in stool, constipation, diarrhea, nausea and vomiting.   Genitourinary: Negative for dysuria and hematuria.   Musculoskeletal: Negative for back pain.   Skin: Negative for rash.   Neurological: Positive for weakness. Negative for dizziness and headaches.     Objective:     Vital Signs (Most Recent):  Temp: 97.2 °F (36.2 °C) (08/26/20 1129)  Pulse: 97 (08/26/20 1142)  Resp: 17 (08/26/20 1129)  BP: 122/69 (08/26/20 1129)  SpO2: 95 % (08/26/20 1129) Vital Signs (24h Range):  Temp:  [97.2 °F (36.2 °C)-98.5 °F (36.9 °C)] 97.2 °F (36.2 °C)  Pulse:  [] 97  Resp:  [16-18] 17  SpO2:  [93 %-98 %] 95 %  BP: (122-143)/(68-86) 122/69     Weight: 97.5 kg (214 lb 15.2 oz)  Body mass index is 33.67 kg/m².    Intake/Output Summary (Last 24 hours) at 8/26/2020 1211  Last data filed at 8/25/2020 1700  Gross per 24 hour   Intake --   Output 400 ml   Net -400 ml      Physical Exam  Vitals signs and nursing note reviewed.   Constitutional:       General: She is not in acute distress.     Appearance: She is well-developed.   Eyes:      Conjunctiva/sclera: Conjunctivae normal.   Neck:      Vascular: No JVD.   Cardiovascular:      Rate and Rhythm: Normal rate and regular rhythm.      Heart sounds: Normal heart sounds.   Pulmonary:      Effort: Pulmonary effort is normal.      Breath sounds: Normal breath sounds. No wheezing.   Abdominal:      General: Bowel sounds are normal.      Palpations: Abdomen is soft.      Tenderness: There is no abdominal tenderness.   Musculoskeletal:         General: No tenderness.   Skin:     General: Skin is warm and dry.       Capillary Refill: Capillary refill takes less than 2 seconds.   Neurological:      Mental Status: She is alert and oriented to person, place, and time.      Comments: Right sided weakness . Handgrip. Weaker than on the left   Psychiatric:         Mood and Affect: Mood is depressed.         Significant Labs: BMP: No results for input(s): GLU, NA, K, CL, CO2, BUN, CREATININE, CALCIUM, MG in the last 48 hours.  CBC: No results for input(s): WBC, HGB, HCT, PLT in the last 48 hours.  CMP: No results for input(s): NA, K, CL, CO2, GLU, BUN, CREATININE, CALCIUM, PROT, ALBUMIN, BILITOT, ALKPHOS, AST, ALT, ANIONGAP, EGFRNONAA in the last 48 hours.    Invalid input(s): ESTGF[place]    Significant Imaging: I have reviewed all pertinent imaging results/findings within the past 24 hours.     CTA Chest Non-Coronary  Narrative: EXAMINATION:  CTA CHEST NON CORONARY    CLINICAL HISTORY:  PE suspected, high pretest prob;    TECHNIQUE:  Low dose axial images, sagittal and coronal reformations were obtained from the thoracic inlet to the lung bases following the IV administration of 100 mL of Omnipaque 350.  Contrast timing was optimized to evaluate the pulmonary arteries.  MIP images were performed.    COMPARISON:  02/25/2016    FINDINGS:  The structures at the base of the neck are grossly unremarkable.  No significant mediastinal lymphadenopathy.  The heart is not enlarged.  The thoracic aorta tapers normally noting atherosclerotic calcification in the distribution of the coronary arteries.  The visualized portions of the spleen, pancreas, liver and right adrenal gland are grossly unremarkable.  There is a nodule within the left adrenal gland measuring 1.7 cm, attenuation of which is nonspecific although may reflect adenoma.  There is a partially visualized low attenuating lesion arising from the upper pole of the left kidney measuring attenuation most suggestive of cyst.    The airways are patent.  Evaluation of the pulmonary  parenchyma is limited secondary to motion artifact and artifact from patient's left arm.  There is subtle mosaic attenuation of the pulmonary parenchyma, could reflect sequela of small airways disease or small vessel disease.  No large focal consolidation.  No pneumothorax.  No pleural effusion.    Bolus timing is adequate for evaluation of pulmonary thromboembolism.  There is some limitation secondary to respiratory motion artifact.  Allowing for this, no convincing pulmonary arterial filling defect to the level of the segmental arteries bilaterally to suggest pulmonary thromboembolism.    Degenerative changes are noted of the spine.  No significant axillary lymphadenopathy.  Impression: 1. No pulmonary thromboembolism noting exam limitations above.  2. Pulmonary findings of mosaic attenuation is nonspecific, possibly reflecting early sequela of small airways disease or small vessel disease, no large focal consolidation.  3. Please see above for additional findings.    Electronically signed by: Salo Garcia MD  Date:    08/24/2020  Time:    12:19  Echo Color Flow Doppler? Yes; Bubble Contrast? Yes  · Low normal left ventricular systolic function. The estimated ejection   fraction is 50%.  · Local segmental wall motion abnormalities.  · Concentric left ventricular remodeling.  · Indeterminate left ventricular diastolic function.  · Hyperdynamic right ventricular systolic function.  · Normal central venous pressure (3 mmHg).  · Patent foramen ovale present.  · Study is positive for an intracardiac shunt.  · Positive Matias's sign. If clinically indicated recommend evaluation   for acute pulmonary embolism.     MRI BRAIN WITHOUT CONTRAST  Narrative: EXAMINATION:  MRI BRAIN WITHOUT CONTRAST    CLINICAL HISTORY:  cva;    TECHNIQUE:  Multiplanar multisequence MR imaging of the brain was performed without contrast.    COMPARISON:  CTA head neck dated 08/23/2020    FINDINGS:  There are several foci of diffusion  restriction of the left cerebral hemisphere involving the left frontal, left parietal, left temporal lobes, and left basal ganglia.  Foci distribution involve deep and subcortical white matter with few cortical foci of diffusion.  Foci demonstrate corresponding T2 FLAIR signal hyperintensity.  No midline shift, mass effect, or extra-axial collection.  Ventricles are similar in size.  Limited visualization of the left IC T2 flow void at the C2, C3 and portions of C4 segments in keeping with findings better demonstrated on comparison CTA.    There is mild mucosal thickening of the left sphenoid sinus.  No infiltrative T1 marrow signal.  Impression: Several foci of diffusion involving the left cerebral hemisphere (frontal, parietal, temporal lobes and left basal ganglia) in keeping with recent small infarcts, potentially embolic in nature.    This report was flagged in Epic as abnormal.    Electronically signed by: Billy Ventura  Date:    08/24/2020  Time:    08:48

## 2020-08-26 NOTE — PT/OT/SLP PROGRESS
Occupational Therapy   Treatment    Name: Rosalia Fagan  MRN: [id number]  Admitting Diagnosis:  Cerebrovascular accident (CVA) due to embolism       Recommendations:     Discharge Recommendations: rehabilitation facility  Discharge Equipment Recommendations:  (Defer to IPR)  Barriers to discharge:  None    Assessment:   Patient remains /c flat affect, somewhat labile at end of session. Remains /c impaired proprioception and dysmetria in RUE. Patient remains appropriate for IPR to address functional deficits and improve performance in ADL tasks.     Rosalia Fagan is a 59 y.o. female with a medical diagnosis of Cerebrovascular accident (CVA) due to embolism. Performance deficits affecting function are weakness, impaired endurance, impaired self care skills, impaired functional mobilty, gait instability, impaired balance, decreased upper extremity function, decreased ROM, decreased lower extremity function, decreased safety awareness, decreased coordination, impaired coordination, impaired fine motor.     Rehab Prognosis:  Good; patient would benefit from acute skilled OT services to address these deficits and reach maximum level of function.       Plan:     Patient to be seen 5 x/week to address the above listed problems via self-care/home management, therapeutic activities, therapeutic exercises  · Plan of Care Expires: 09/22/20  · Plan of Care Reviewed with: patient, son    Subjective     Pain/Comfort:  · Pain Rating 1: 0/10  · Pain Rating Post-Intervention 1: 0/10    Objective:     Communicated with: nurseTeena prior to session.  Patient found HOB elevated with bed alarm, telemetry upon OT entry to room.    General Precautions: Standard, fall   Orthopedic Precautions:N/A   Braces: N/A     Bed Mobility:    · Patient completed Scooting/Bridging with stand by assistance  · Patient completed Supine to Sit with stand by assistance  · Patient completed Sit to Supine with stand by assistance     Functional  Mobility/Transfers:  · Patient completed Sit <> Stand Transfer with stand by assistance  with  no assistive device     Activities of Daily Living:  · Grooming: stand by assistance and increased time to open items    [facility] 6 Click ADL: 18    Treatment & Education:  Patient /c bed mob as noted above. Flat affect throughout, though conversation increased slightly today. Ambulated to sink for G/H tasks /c CGA, no AD-- increased time to use RUE for fnxl tasks. Returned to EOB and completed FTN RUE (impaired; dysmetria noted), finger opposition (impaired on R, minimal fluidity), AROM, crossing midline, visual field testing (unable to complete 2/2 patient becoming tearful/upset). Encouragement provided.     Patient left HOB elevated with all lines intact, call button in reach, bed alarm on, nsg notified and son presentEducation:      GOALS:   Multidisciplinary Problems     Occupational Therapy Goals        Problem: Occupational Therapy Goal    Goal Priority Disciplines Outcome Interventions   Occupational Therapy Goal     OT, PT/OT Ongoing, Progressing    Description: Goals to be met by: 09/23/2020     Patient will increase functional independence with ADLs by performing:    Feeding with Modified [place].  UE Dressing with Modified [place].  LE Dressing with Modified [place].  Grooming while standing at sink with Modified [place].  Toileting from toilet with Modified [place] for hygiene and clothing management.   Toilet transfer to toilet with Modified [place].                     Time Tracking:     OT Date of Treatment: 08/26/20  OT Start Time: 1101  OT Stop Time: 1124  OT Total Time (min): 23 min    Billable Minutes:Self Care/Home Management 10  Therapeutic Activity 13    LINA Fofana  8/26/2020

## 2020-08-27 VITALS
HEART RATE: 112 BPM | BODY MASS INDEX: 33.74 KG/M2 | SYSTOLIC BLOOD PRESSURE: 111 MMHG | RESPIRATION RATE: 16 BRPM | HEIGHT: 67 IN | DIASTOLIC BLOOD PRESSURE: 75 MMHG | OXYGEN SATURATION: 97 % | TEMPERATURE: 98 F | WEIGHT: 214.94 LBS

## 2020-08-27 PROBLEM — I63.9 CVA (CEREBRAL VASCULAR ACCIDENT): Status: ACTIVE | Noted: 2020-08-27

## 2020-08-27 PROCEDURE — 97530 THERAPEUTIC ACTIVITIES: CPT

## 2020-08-27 PROCEDURE — 25000003 PHARM REV CODE 250: Performed by: HOSPITALIST

## 2020-08-27 PROCEDURE — 92526 ORAL FUNCTION THERAPY: CPT

## 2020-08-27 PROCEDURE — 25000003 PHARM REV CODE 250: Performed by: INTERNAL MEDICINE

## 2020-08-27 PROCEDURE — 92507 TX SP LANG VOICE COMM INDIV: CPT

## 2020-08-27 PROCEDURE — 25000003 PHARM REV CODE 250: Performed by: NURSE PRACTITIONER

## 2020-08-27 PROCEDURE — 97110 THERAPEUTIC EXERCISES: CPT

## 2020-08-27 PROCEDURE — 97535 SELF CARE MNGMENT TRAINING: CPT

## 2020-08-27 PROCEDURE — 97110 THERAPEUTIC EXERCISES: CPT | Mod: CQ

## 2020-08-27 PROCEDURE — 94761 N-INVAS EAR/PLS OXIMETRY MLT: CPT

## 2020-08-27 PROCEDURE — 97116 GAIT TRAINING THERAPY: CPT | Mod: CQ

## 2020-08-27 RX ORDER — ESCITALOPRAM OXALATE 5 MG/1
5 TABLET ORAL DAILY
Status: DISCONTINUED | OUTPATIENT
Start: 2020-08-28 | End: 2020-08-27 | Stop reason: HOSPADM

## 2020-08-27 RX ORDER — SPIRONOLACTONE 25 MG/1
25 TABLET ORAL DAILY
Qty: 30 TABLET | Refills: 11 | Status: SHIPPED | OUTPATIENT
Start: 2020-08-28 | End: 2020-09-09

## 2020-08-27 RX ORDER — BENAZEPRIL HYDROCHLORIDE 20 MG/1
20 TABLET ORAL DAILY
Qty: 90 TABLET | Refills: 3 | Status: SHIPPED | OUTPATIENT
Start: 2020-08-28 | End: 2020-09-09

## 2020-08-27 RX ORDER — ESCITALOPRAM OXALATE 5 MG/1
5 TABLET ORAL DAILY
Qty: 30 TABLET | Refills: 11 | Status: SHIPPED | OUTPATIENT
Start: 2020-08-28 | End: 2020-09-09 | Stop reason: SDUPTHER

## 2020-08-27 RX ORDER — AMLODIPINE BESYLATE 10 MG/1
10 TABLET ORAL DAILY
Qty: 30 TABLET | Refills: 11 | Status: SHIPPED | OUTPATIENT
Start: 2020-08-28 | End: 2020-09-09

## 2020-08-27 RX ADMIN — AMLODIPINE BESYLATE 10 MG: 5 TABLET ORAL at 08:08

## 2020-08-27 RX ADMIN — ASPIRIN 81 MG: 81 TABLET, COATED ORAL at 08:08

## 2020-08-27 RX ADMIN — ATORVASTATIN CALCIUM 40 MG: 40 TABLET, FILM COATED ORAL at 08:08

## 2020-08-27 RX ADMIN — BENAZEPRIL HYDROCHLORIDE 20 MG: 20 TABLET, COATED ORAL at 08:08

## 2020-08-27 RX ADMIN — SPIRONOLACTONE 25 MG: 25 TABLET ORAL at 08:08

## 2020-08-27 RX ADMIN — CLOPIDOGREL 75 MG: 75 TABLET, FILM COATED ORAL at 08:08

## 2020-08-27 NOTE — ASSESSMENT & PLAN NOTE
Presents with aphasia and right side weakness 3/5  - now improving      CT head nonacute  MRI of brain: Several foci of diffusion involving the left cerebral hemisphere (frontal, parietal, temporal lobes and left basal ganglia) in keeping with recent small infarcts, potentially embolic in nature.  CTA head and neck: Occlusion of the left proximal internal carotid artery with collateral filling/reconstitution intracranially within the cavernous carotid portion.  Carotid US: occluded left ICA  Echo with bubble: patent PFO with + Matias's sign  Atorvastatin 40mg daily  ASA 81mg daily and plavix 75mg daily x3 months at least  - TSH wnl, EKG NSR, A1c 5.3, , B12 wnl  - PT/OT/SLP: recommend IPR    Appreciate Neurology reccs  Awaiting for insurance approval for RAYMOND VALLADARES a.m. concerned that she may be developing post stroke depression.  Consider SSRI

## 2020-08-27 NOTE — PLAN OF CARE
Problem: Occupational Therapy Goal  Goal: Occupational Therapy Goal  Description: Goals to be met by: 09/23/2020     Patient will increase functional independence with ADLs by performing:    Feeding with Modified [place].  UE Dressing with Modified [place].  LE Dressing with Modified [place].  Grooming while standing at sink with Modified [place].  Toileting from toilet with Modified [place] for hygiene and clothing management.   Toilet transfer to toilet with Modified [place].    Outcome: Ongoing, Progressing   Pt found UIC & agreeable to OT this AM.  Re-assessment as follows for R UE:  Scap elevation 4-/5; retraction 3-/5 w/ delayed initiation  Shldr flex: (compensatory abd w/ IR); true flex 2-/5; abd: just under 90* w/ compensatory lateral trunk lean to L; ext 3-/5  Elb flex: 3-/5 w/ delayed initiation; ext 3-/5 (unable to achieve terminal ext)  Forearm sup/pron at least 3/5  Wrist ext: 4/5; flex 3-/5   Interosseous: 3-/5  Composite flex/ext Ds: 3-/5  Pt w/ diminished proprio/kinesthesia throughout RUE  Noted mod decreased fxnl use (dominant) RUE w/ subsequent increased fxnl use (non-dominant) LUE throughout oral hygiene tasks w/ delayed motor planning/initiation at RUE.  Provided pt w/ yellow Tputty, coins & HEP for digital isolation, 2-3 point pinch, in-hand manipulation & SROM for true R shldr flex.  Edu/tx re: general safety techs, fxnl prognosis RUE, visual compensation techs & HEP. Pt/son verbalized understanding.    Pt w/ noted emotional lability & frustration w/ R FMC. No misalignment of R scap or sublux R GHJ. Pt w/ great potential for full/near-full return at RUE & excellent candidate for IPR followed by out-pt PT/OT. Cont w/ OT per POC.

## 2020-08-27 NOTE — PLAN OF CARE
08/27/20 1736   Final Note   Assessment Type Final Discharge Note   Anticipated Discharge Disposition Rehab  ([facility])   Hospital Follow Up  Appt(s) scheduled? Yes   Discharge plans and expectations educations in teach back method with documentation complete? Yes   Right Care Referral Info   Post Acute Recommendation IRF   Post-Acute Status   Post-Acute Authorization Placement   Post-Acute Placement Status Set-up Complete   Discharge Delays None known at this time       Inés Tay RN    [phone]

## 2020-08-27 NOTE — DISCHARGE INSTRUCTIONS
Stroke, Discharge Instructions for (English) View Edit Remove   Stroke, Symptoms (English) View Edit Remove   Smoking Cessation (English) View Edit Remove   Stroke, Risk Factors for (English) View Edit Remove   Atorvastatin tablets (English) View Edit Remove   Clopidogrel Bisulfate Oral tablet (English) View Edit Remove   Benazepril tablets (English) View Edit Remove   Escitalopram tablets (English) View Edit Remove

## 2020-08-27 NOTE — PLAN OF CARE
AVS summary printed. Discharge instruction and education provided. Patient voices understanding. IV site removed cath tip intact. Telemetry discontinued without adverse reaction. Patient shows no acute distress. Report called to [facility]. Patient transport waiting at bedside.

## 2020-08-27 NOTE — PLAN OF CARE
CM continues to attempt to contact insurance company at [phone], call disconnect x's 2 after gay 10min hold.  Communicated with son Brayden regarding attempts.  CM attempted to fax updated clinicals again.    CM placed on conference call with son Darwin and  Airam HERNANDEZ, discussed delay in processing, Darwin voiced frustration and displeasure with insurance process and standard turnaround 3-5 days.  Airam verbalized understanding and states she will document but cannot escalate case as it is with the medical director for review.  Per Airam, CM can anticipate decision today.  Will continue to follow and assist as needed.    Inés Tay RN    [phone]

## 2020-08-27 NOTE — ASSESSMENT & PLAN NOTE
Presently on  Amlodipine, benazapril, aldactone  Increased Benazapril dose  Doing much better.  Will need to change doses in transfer

## 2020-08-27 NOTE — PLAN OF CARE
Problem: SLP Goal  Goal: SLP Goal  Description: Short Term Goals:  1. Pt will participate in BSS to determine least restrictive diet. - MET 8/23/20  2. Pt will tolerate regular textures/thin liquids PO diet with no overt s/s of aspiration.  3. Pt will participate in informal speech-lang eval to r/o cognitive-linguistic deficits. - MET 8/23/20  4. Pt will answer complex Y/N questions with 80% acc ind'ly  5. Pt will follow 2+ step commands with 80% acc ind'ly  6. Pt will complete word retrieval tasks with 80% acc with min cuing  7. Pt will complete higher level mental manipulation tasks (functional problem solving, reasoning, memory, sequencing, etc). with 80% acc with min-mod asst.   8. Pt will participate in OMEs x10 to improve oral motor function/strength   *further goals pending pt's progress*  Outcome: Ongoing, Progressing   Pt seen in room, labile during session, increased difficulty with communicating noted this AM.

## 2020-08-27 NOTE — PLAN OF CARE
Problem: Physical Therapy Goal  Goal: Physical Therapy Goal  Description: Goals to be met by: 2020    Patient will increase functional independence with mobility by performin) Supine<>Sit with independence   2) Sit <>Stand with independence  3) Bed <>Chair with independence   4) Pt to ambulate 500 feet with independence   5) Pt to perform BLE X10 mod I with handout.           Outcome: Ongoing, Progressing   Goals ongoing

## 2020-08-27 NOTE — SUBJECTIVE & OBJECTIVE
Interval History:  Blood pressure much better controlled.  Still awaiting insurance authorization for rehab    Review of Systems  Objective:     Vital Signs (Most Recent):  Temp: 98.3 °F (36.8 °C) (08/27/20 0733)  Pulse: 99 (08/27/20 0733)  Resp: 17 (08/27/20 0733)  BP: (!) 141/80 (08/27/20 0733)  SpO2: 97 % (08/27/20 0733) Vital Signs (24h Range):  Temp:  [97.2 °F (36.2 °C)-98.4 °F (36.9 °C)] 98.3 °F (36.8 °C)  Pulse:  [] 99  Resp:  [16-17] 17  SpO2:  [95 %-97 %] 97 %  BP: (122-141)/(69-80) 141/80     Weight: 97.5 kg (214 lb 15.2 oz)  Body mass index is 33.67 kg/m².    Intake/Output Summary (Last 24 hours) at 8/27/2020 0941  Last data filed at 8/27/2020 0337  Gross per 24 hour   Intake 480 ml   Output 900 ml   Net -420 ml      Physical Exam

## 2020-08-27 NOTE — PT/OT/SLP PROGRESS
Occupational Therapy   Treatment    Name: Rosalia Fagan  MRN: [id number]  Admitting Diagnosis:  Cerebrovascular accident (CVA) due to embolism       Recommendations:     Discharge Recommendations: rehabilitation facility  Discharge Equipment Recommendations:  (defer to IPR)  Barriers to discharge:  None    Assessment:     Pt w/ noted emotional lability & frustration w/ R FMC. No misalignment of R scap or sublux R GHJ. Pt w/ great potential for full/near-full return at RUE & excellent candidate for IPR followed by out-pt PT/OT. Cont w/ OT per POC.    Rosalia Fagan is a 59 y.o. female with a medical diagnosis of Cerebrovascular accident (CVA) due to embolism.  She presents with . Performance deficits affecting function are weakness, impaired endurance, impaired sensation, impaired self care skills, impaired functional mobilty, gait instability, decreased lower extremity function, decreased upper extremity function, decreased coordination, impaired balance, decreased safety awareness, abnormal tone, decreased ROM, impaired fine motor.     Rehab Prognosis:  Good; patient would benefit from acute skilled OT services to address these deficits and reach maximum level of function.       Plan:     Patient to be seen 5 x/week to address the above listed problems via therapeutic activities, therapeutic exercises, neuromuscular re-education  · Plan of Care Expires: 09/22/20  · Plan of Care Reviewed with: patient, son    Subjective     Pain/Comfort:  · Pain Rating 1: 0/10  · Pain Rating Post-Intervention 1: 0/10    Objective:     Communicated with: nspepe prior to session.  Patient found up in chair with telemetry(chair alarm) upon OT entry to room.    General Precautions: Standard, fall   Orthopedic Precautions:N/A   Braces: N/A     Occupational Performance:     Bed Mobility:    ·      Functional Mobility/Transfers:  · Patient completed Sit <> Stand Transfer with stand by assistance  with  no assistive device   · Patient  completed Toilet Transfer Step Transfer technique with stand by assistance with  grab bars  · Functional Mobility: w/o DME for short dist w/in room for ADLs w/ SB-CGA    Activities of Daily Living:  · Grooming: stand by assistance standing at sink  · Toileting: supervision on toilet      [facility] 6 Click ADL: 18    Treatment & Education:  Pt found [facility]IC & agreeable to OT this AM.  Re-assessment as follows for R UE:  Scap elevation 4-/5; retraction 3-/5 w/ delayed initiation  Shldr flex: (compensatory abd w/ IR); true flex 2-/5; abd: just under 90* w/ compensatory lateral trunk lean to L; ext 3-/5  Elb flex: 3-/5 w/ delayed initiation; ext 3-/5 (unable to achieve terminal ext)  Forearm sup/pron at least 3/5  Wrist ext: 4/5; flex 3-/5   Interosseous: 3-/5  Composite flex/ext Ds: 3-/5  Pt w/ diminished proprio/kinesthesia throughout RUE  Noted mod decreased fxnl use (dominant) RUE w/ subsequent increased fxnl use (non-dominant) LUE throughout oral hygiene tasks w/ delayed motor planning/initiation at RUE.  Provided pt w/ yellow Tputty, coins & HEP for digital isolation, 2-3 point pinch, in-hand manipulation & SROM for true R shldr flex.  Edu/tx re: general safety techs, fxnl prognosis RUE, visual compensation techs & HEP. Pt/son verbalized understanding.    Patient left up in chair with all lines intact, call button in reach, chair alarm on, nsg notified and son presentEducation:      GOALS:   Multidisciplinary Problems     Occupational Therapy Goals        Problem: Occupational Therapy Goal    Goal Priority Disciplines Outcome Interventions   Occupational Therapy Goal     OT, PT/OT Ongoing, Progressing    Description: Goals to be met by: 09/23/2020     Patient will increase functional independence with ADLs by performing:    Feeding with Modified [place].  UE Dressing with Modified [place].  LE Dressing with Modified [place].  Grooming while standing at sink with Modified [place].  Toileting from toilet  with Modified [place] for hygiene and clothing management.   Toilet transfer to toilet with Modified [place].                     Time Tracking:     OT Date of Treatment: 08/27/20  OT Start Time: 1014  OT Stop Time: 1112  OT Total Time (min): 58 min    Billable Minutes:Self Care/Home Management 15  Therapeutic Activity 15  Therapeutic Exercise 28  Total Time 58    SANTIAGO Mota  8/27/2020

## 2020-08-27 NOTE — PLAN OF CARE
Pt has been approved for inpatient rehab at [facility].  Spoke with Sera in admission, will discuss with her staff if pt can transfer today vs tomorrow AM.  Son Darwin has been notified.      Inés Tay RN    [phone]

## 2020-08-27 NOTE — PT/OT/SLP PROGRESS
"Speech Language Pathology Treatment    Patient Name:  Rosalia Fagan   MRN:  [id number]  Admitting Diagnosis: Cerebrovascular accident (CVA) due to embolism    Recommendations:     General Recommendations:  language. communication              Remediation   Diet recommendations:  Regular, Thin   Aspiration Precautions: 1 bite/sip at a time, Alternating bites/sips, Frequent oral care, HOB to 90 degrees, Meds whole 1 at a time, Remain upright 30 minutes post meal, Small bites/sips and Standard aspiration precautions   General Precautions: Standard, fall  Communication strategies:  provide increased time to answer               Subjective     Pt seen at bedside for ongoing language and monitor with PO.   Patient goals: "its not good."     Pain/Comfort:  · Pain Rating 1: 0/10    Objective:     Has the patient been evaluated by SLP for swallowing?   Yes  Keep patient NPO? No   Current Respiratory Status: room air      Language/communication: Pt seen in room, discussed ST goals. Pt with flat affect and discussed specific techniques that can be used to elicit target words in conversation. Pt did not appear to understand strategies. Attempted writing activity but she refused, pt became labile "I cannot do that stuff." Pt required verbal encouragement and cues to continue to participate in session. Pt completed automatics using target word with fair success. Pt has difficulty generating word list and completing descriptive tasks. Pt will continue to benefit from aggressive ST therapy post DC.       Swallowing: Pt seen this date in room, provided assistance to her on feeding self yogurt. Pt stated she did not have much appetite. She is down and asking about DC. Pt did request coffee, SLP fixed her a cup of coffee and pt consumed with no issues. Pt was appreciative of coffee.       Assessment:     Rosalia Fagan is a 59 y.o. female admitted with CVA who presents with an SLP diagnosis of Aphasia and Cognitive-Linguistic " Impairment.      Goals:   Multidisciplinary Problems     SLP Goals        Problem: SLP Goal    Goal Priority Disciplines Outcome   SLP Goal     SLP Ongoing, Progressing   Description: Short Term Goals:  1. Pt will participate in BSS to determine least restrictive diet. - MET 8/23/20  2. Pt will tolerate regular textures/thin liquids PO diet with no overt s/s of aspiration.  3. Pt will participate in informal speech-lang eval to r/o cognitive-linguistic deficits. - MET 8/23/20  4. Pt will answer complex Y/N questions with 80% acc ind'ly  5. Pt will follow 2+ step commands with 80% acc ind'ly  6. Pt will complete word retrieval tasks with 80% acc with min cuing  7. Pt will complete higher level mental manipulation tasks (functional problem solving, reasoning, memory, sequencing, etc). with 80% acc with min-mod asst.   8. Pt will participate in OMEs x10 to improve oral motor function/strength   *further goals pending pt's progress*                   Plan:     · Patient to be seen:  3 x/week   · Plan of Care expires:  09/23/20  · Plan of Care reviewed with:  patient   · SLP Follow-Up:  Yes       Discharge recommendations:  rehabilitation facility     Time Tracking:     SLP Treatment Date:   08/27/20  Speech Start Time:  0922  Speech Stop Time:  0941     Speech Total Time (min):  19 min    Billable Minutes: Speech Therapy Individual 11 and Treatment Swallowing Dysfunction 8    BINA Mata, CCC-SLP  08/27/2020

## 2020-08-27 NOTE — PROGRESS NOTES
Pharmacy New Medication Education    Patient and/or Caregiver ACCEPTED medication education.    Pharmacy has provided education on the name, indication, and possible side effects of the medication(s) prescribed, using teach-back method.     Learners of pharmacy medication education includes:  patient    The following medications have also been discussed, during this admission.     Current Facility-Administered Medications   Medication Frequency    amLODIPine tablet 10 mg Daily    aspirin EC tablet 81 mg Daily    atorvastatin tablet 40 mg Daily    benazepriL tablet 20 mg Daily    clopidogreL tablet 75 mg Daily    ondansetron disintegrating tablet 8 mg Q8H PRN    pneumoc 13-piotr conj-dip cr(PF) (PREVNAR 13 (PF)) 0.5 mL vaccine x 1 dose    sodium chloride 0.9% flush 10 mL PRN    spironolactone tablet 25 mg Daily        Thank you  Vasu Valdes, PharmD

## 2020-08-27 NOTE — PLAN OF CARE
Problem: Adult Inpatient Plan of Care  Goal: Plan of Care Review    Patient is awake and orientedx4. Care plan explained to patient; she verbalized understanding. On room air, O2 saturation at 95%. Hooked to heart monitor running normal sinus rhythm at 80-90bpm. Urinal in reach. No pain/n/v/d during shift. Due medications given. Neuro checks every 4 hours [slight right facial droop noted, delayed responses, and slight slurred speech. NIH increased from a 3 to a 5. Stroke education provided to patient. QUENTIN in room for safety. Encouraged to turn every 2 hours as tolerated. Maintained on fall risk precaution. Bed in lowest position, bed alarm on, call light/personal items within reach and instructed to call for help when needed. Will continue to monitor.    Outcome: Ongoing, Progressing

## 2020-08-27 NOTE — PROGRESS NOTES
[facility] Medicine  Progress Note    Patient Name: Rosalia Fagan  MRN: [id number]  Patient Class: IP- Inpatient   Admission Date: 8/22/2020  Length of Stay: 2 days  Attending Physician: Nieves Velazquez MD  Primary Care Provider: Barbara Hameed NP        Subjective:     Principal Problem:Cerebrovascular accident (CVA) due to embolism        HPI:  Ms. Hendricks is a 59-year-old female with a history of hypertension, hyperlipidemia, COPD, chronic back pain who presented to [place]  emergency department with c/o right-sided weakness for 2 days. She initially presented to [facility] ED for the same but refused to be transferred and left AMA. Head CT was negative. She has aphasia and decreased strength 3/5 in the right upper and lower extremity as well as a right sided facial droop. Her lab work revealed a mild troponin elevation, 0.030 and elevated triglycerides at 207. Vascular Neurology recommended further imaging and neurologic work-up. She was admitted to [facility] hospital medicine for further care.     Overview/Hospital Course:  No notes on file    Interval History: no complaints  Improved strength on right arm but still somewhat spastic  Awaiting insurance approval    Review of Systems   Constitutional: Negative for chills and fever.   Respiratory: Negative for cough, shortness of breath and wheezing.    Cardiovascular: Negative for chest pain, palpitations and leg swelling.   Gastrointestinal: Negative for abdominal pain, blood in stool, constipation, diarrhea, nausea and vomiting.   Genitourinary: Negative for dysuria and hematuria.   Musculoskeletal: Negative for back pain.   Skin: Negative for rash.   Neurological: Positive for weakness. Negative for dizziness and headaches.     Objective:     Vital Signs (Most Recent):  Temp: 97.2 °F (36.2 °C) (08/26/20 1129)  Pulse: 97 (08/26/20 1142)  Resp: 17 (08/26/20 1129)  BP: 122/69 (08/26/20 1129)  SpO2: 95 %  (08/26/20 1129) Vital Signs (24h Range):  Temp:  [97.2 °F (36.2 °C)-98.5 °F (36.9 °C)] 97.2 °F (36.2 °C)  Pulse:  [] 97  Resp:  [16-18] 17  SpO2:  [93 %-98 %] 95 %  BP: (122-143)/(68-86) 122/69     Weight: 97.5 kg (214 lb 15.2 oz)  Body mass index is 33.67 kg/m².    Intake/Output Summary (Last 24 hours) at 8/26/2020 1211  Last data filed at 8/25/2020 1700  Gross per 24 hour   Intake --   Output 400 ml   Net -400 ml      Physical Exam  Vitals signs and nursing note reviewed.   Constitutional:       General: She is not in acute distress.     Appearance: She is well-developed.   Eyes:      Conjunctiva/sclera: Conjunctivae normal.   Neck:      Vascular: No JVD.   Cardiovascular:      Rate and Rhythm: Normal rate and regular rhythm.      Heart sounds: Normal heart sounds.   Pulmonary:      Effort: Pulmonary effort is normal.      Breath sounds: Normal breath sounds. No wheezing.   Abdominal:      General: Bowel sounds are normal.      Palpations: Abdomen is soft.      Tenderness: There is no abdominal tenderness.   Musculoskeletal:         General: No tenderness.   Skin:     General: Skin is warm and dry.      Capillary Refill: Capillary refill takes less than 2 seconds.   Neurological:      Mental Status: She is alert and oriented to person, place, and time.      Comments: Right sided weakness . Handgrip. Weaker than on the left   Psychiatric:         Mood and Affect: Mood is depressed.         Significant Labs: BMP: No results for input(s): GLU, NA, K, CL, CO2, BUN, CREATININE, CALCIUM, MG in the last 48 hours.  CBC: No results for input(s): WBC, HGB, HCT, PLT in the last 48 hours.  CMP: No results for input(s): NA, K, CL, CO2, GLU, BUN, CREATININE, CALCIUM, PROT, ALBUMIN, BILITOT, ALKPHOS, AST, ALT, ANIONGAP, EGFRNONAA in the last 48 hours.    Invalid input(s): ESTGFAFRICA    Significant Imaging: I have reviewed all pertinent imaging results/findings within the past 24 hours.     CTA Chest  Non-Coronary  Narrative: EXAMINATION:  CTA CHEST NON CORONARY    CLINICAL HISTORY:  PE suspected, high pretest prob;    TECHNIQUE:  Low dose axial images, sagittal and coronal reformations were obtained from the thoracic inlet to the lung bases following the IV administration of 100 mL of Omnipaque 350.  Contrast timing was optimized to evaluate the pulmonary arteries.  MIP images were performed.    COMPARISON:  02/25/2016    FINDINGS:  The structures at the base of the neck are grossly unremarkable.  No significant mediastinal lymphadenopathy.  The heart is not enlarged.  The thoracic aorta tapers normally noting atherosclerotic calcification in the distribution of the coronary arteries.  The visualized portions of the spleen, pancreas, liver and right adrenal gland are grossly unremarkable.  There is a nodule within the left adrenal gland measuring 1.7 cm, attenuation of which is nonspecific although may reflect adenoma.  There is a partially visualized low attenuating lesion arising from the upper pole of the left kidney measuring attenuation most suggestive of cyst.    The airways are patent.  Evaluation of the pulmonary parenchyma is limited secondary to motion artifact and artifact from patient's left arm.  There is subtle mosaic attenuation of the pulmonary parenchyma, could reflect sequela of small airways disease or small vessel disease.  No large focal consolidation.  No pneumothorax.  No pleural effusion.    Bolus timing is adequate for evaluation of pulmonary thromboembolism.  There is some limitation secondary to respiratory motion artifact.  Allowing for this, no convincing pulmonary arterial filling defect to the level of the segmental arteries bilaterally to suggest pulmonary thromboembolism.    Degenerative changes are noted of the spine.  No significant axillary lymphadenopathy.  Impression: 1. No pulmonary thromboembolism noting exam limitations above.  2. Pulmonary findings of mosaic attenuation  is nonspecific, possibly reflecting early sequela of small airways disease or small vessel disease, no large focal consolidation.  3. Please see above for additional findings.    Electronically signed by: Salo Garcia MD  Date:    08/24/2020  Time:    12:19  Echo Color Flow Doppler? Yes; Bubble Contrast? Yes  · Low normal left ventricular systolic function. The estimated ejection   fraction is 50%.  · Local segmental wall motion abnormalities.  · Concentric left ventricular remodeling.  · Indeterminate left ventricular diastolic function.  · Hyperdynamic right ventricular systolic function.  · Normal central venous pressure (3 mmHg).  · Patent foramen ovale present.  · Study is positive for an intracardiac shunt.  · Positive Matias's sign. If clinically indicated recommend evaluation   for acute pulmonary embolism.     MRI BRAIN WITHOUT CONTRAST  Narrative: EXAMINATION:  MRI BRAIN WITHOUT CONTRAST    CLINICAL HISTORY:  cva;    TECHNIQUE:  Multiplanar multisequence MR imaging of the brain was performed without contrast.    COMPARISON:  CTA head neck dated 08/23/2020    FINDINGS:  There are several foci of diffusion restriction of the left cerebral hemisphere involving the left frontal, left parietal, left temporal lobes, and left basal ganglia.  Foci distribution involve deep and subcortical white matter with few cortical foci of diffusion.  Foci demonstrate corresponding T2 FLAIR signal hyperintensity.  No midline shift, mass effect, or extra-axial collection.  Ventricles are similar in size.  Limited visualization of the left IC T2 flow void at the C2, C3 and portions of C4 segments in keeping with findings better demonstrated on comparison CTA.    There is mild mucosal thickening of the left sphenoid sinus.  No infiltrative T1 marrow signal.  Impression: Several foci of diffusion involving the left cerebral hemisphere (frontal, parietal, temporal lobes and left basal ganglia) in keeping with recent small  infarcts, potentially embolic in nature.    This report was flagged in Epic as abnormal.    Electronically signed by: Billy Ventura  Date:    08/24/2020  Time:    08:48           Assessment/Plan:      * Cerebrovascular accident (CVA) due to embolism  Presents with aphasia and right side weakness 3/5  - now improving      CT head nonacute  MRI of brain: Several foci of diffusion involving the left cerebral hemisphere (frontal, parietal, temporal lobes and left basal ganglia) in keeping with recent small infarcts, potentially embolic in nature.  CTA head and neck: Occlusion of the left proximal internal carotid artery with collateral filling/reconstitution intracranially within the cavernous carotid portion.  Carotid US: occluded left ICA  Echo with bubble: patent PFO with + Matias's sign  Atorvastatin 40mg daily  ASA 81mg daily and plavix 75mg daily x3 months at least  - TSH wnl, EKG NSR, A1c 5.3, , B12 wnl  - PT/OT/SLP: recommend IPR    Appreciate Neurology reccs  Awaiting for insurance approval for IPR    I a.mCrystal concerned that she may be developing post stroke depression.  Consider SSRI    PFO (patent foramen ovale)  No immediate intervention. Will need follow up as OP      Carotid occlusion, left    Has collateral flow. No intervention at this point    Stroke    As above.    Hyperlipemia  Not on home statin  Lipid levels WNL with elevated triglyceride noted  Start atorvastatin 40mg      Hypertension  Presently on  Amlodipine, benazapril, aldactone  Increased Benazapril dose  Doing much better.  Will need to change doses in transfer                 VTE Risk Mitigation (From admission, onward)         Ordered     IP VTE LOW RISK PATIENT  Once      08/23/20 0016     Place sequential compression device  Until discontinued      08/23/20 0016                Discharge Planning   ANN-MARIE:      Code Status: Full Code   Is the patient medically ready for discharge?: Yes    Reason for patient still in hospital  (select all that apply):  Awaiting insurance approval for rehab  Discharge Plan A: Home Health          The patient's son has also asks me to fill out an insurance forms for the patient        Nieves Velazquez MD  Department of Hospital Medicine   [facility]

## 2020-08-27 NOTE — PT/OT/SLP PROGRESS
Physical Therapy Treatment    Patient Name:  Rosalia Fagan   MRN:  [id number]    Recommendations:     Discharge Recommendations:  rehabilitation facility   Discharge Equipment Recommendations: (defer to IPR)   Barriers to discharge: Decreased caregiver support and decreased mobility,strength and endurance    Assessment:     Rosalia Fagan is a 59 y.o. female admitted with a medical diagnosis of Cerebrovascular accident (CVA) due to embolism.  She presents with the following impairments/functional limitations:  weakness, impaired endurance, impaired functional mobilty, gait instability, impaired balance, decreased lower extremity function, decreased safety awareness, decreased ROM, impaired coordination,pt with good participation and requires assistance with gait using RW along with decreased safety awareness,pt with limited assistance and will benefit from IPR upon discharge to increase functional independence.    Rehab Prognosis: Good; patient would benefit from acute skilled PT services to address these deficits and reach maximum level of function.    Recent Surgery: * No surgery found *      Plan:     During this hospitalization, patient to be seen 6 x/week to address the identified rehab impairments via gait training, therapeutic activities, therapeutic exercises, neuromuscular re-education and progress toward the following goals:    · Plan of Care Expires:  09/23/20    Subjective     Chief Complaint: n/a  Patient/Family Comments/goals: pt does not want to try ambulation with a SC.  Pain/Comfort:  · Pain Rating 1: (no c/o's)      Objective:     Communicated with nsg prior to session.  Patient found supine with bed alarm, telemetry upon PT entry to room.     General Precautions: Standard, fall   Orthopedic Precautions:    Braces: N/A     Functional Mobility:  · Bed Mobility:     · Supine to Sit: modified independence  · Transfers:     · Sit to Stand:  stand by assistance with no AD  · Bed to Chair: stand by  assistance with  rolling walker  using  ambulation  · Gait: amb ~15' X 1 w/o AD and CGA,amb ~75' X 2 seperate trials with RW and SBA with v/c's required  · Balance: fair standing balance with RW      AM-PAC 6 CLICK MOBILITY  Turning over in bed (including adjusting bedclothes, sheets and blankets)?: 4  Sitting down on and standing up from a chair with arms (e.g., wheelchair, bedside commode, etc.): 3  Moving from lying on back to sitting on the side of the bed?: 3  Moving to and from a bed to a chair (including a wheelchair)?: 3  Need to walk in hospital room?: 3  Climbing 3-5 steps with a railing?: 3  Basic Mobility Total Score: 19       Therapeutic Activities and Exercises: pt declined ambulation trial with SC,pt performed le seated ex's X 10-12 reps inc: ap,laq,hip flex,abd/add.       Patient left up in chair with all lines intact, call button in reach and chair alarm on..    GOALS: see general POC  Multidisciplinary Problems     Physical Therapy Goals        Problem: Physical Therapy Goal    Goal Priority Disciplines Outcome Goal Variances Interventions   Physical Therapy Goal     PT, PT/OT Ongoing, Progressing     Description: Goals to be met by: 2020    Patient will increase functional independence with mobility by performin) Supine<>Sit with independence   2) Sit <>Stand with independence  3) Bed <>Chair with independence   4) Pt to ambulate 500 feet with independence   5) Pt to perform BLE X10 mod I with handout.                            Time Tracking:     PT Received On: 20  PT Start Time: 0943     PT Stop Time: 1007  PT Total Time (min): 24 min     Billable Minutes: Gait Training 15 and Therapeutic Exercise 9    Treatment Type: Treatment  PT/PTA: PTA     PTA Visit Number: 4     Konstantin Keller, PTA  2020

## 2020-09-01 ENCOUNTER — TELEPHONE (OUTPATIENT)
Dept: NEUROLOGY | Facility: CLINIC | Age: 60
End: 2020-09-01

## 2020-09-01 NOTE — TELEPHONE ENCOUNTER
----- Message from Aron Hay MD sent at 8/23/2020  8:15 PM CDT -----  Regarding: New patient from [place]  Currently admitted in [place].  Please set up elisabeth Reid in 2- 3 weeks.    Thanks.    Aron

## 2020-09-01 NOTE — TELEPHONE ENCOUNTER
Called pt and left vm. Called dtr left vm. Called [facility] where pt is admitted. Was given  Peri Tay number. [phone]. Called and left appt info and return number to verify information.

## 2020-09-02 NOTE — PROGRESS NOTES
[facility] Medicine  Progress Note    Patient Name: Rosalia Fagan  MRN: [id number]  Patient Class: IP- Inpatient   Admission Date: 8/22/2020  Length of Stay: 3 days  Attending Physician: No att. providers found  Primary Care Provider: Barbara Hameed NP        Subjective:     Principal Problem:Cerebrovascular accident (CVA) due to embolism        HPI:  Ms. Hendricks is a 59-year-old female with a history of hypertension, hyperlipidemia, COPD, chronic back pain who presented to [place]  emergency department with c/o right-sided weakness for 2 days. She initially presented to [facility] ED for the same but refused to be transferred and left AMA. Head CT was negative. She has aphasia and decreased strength 3/5 in the right upper and lower extremity as well as a right sided facial droop. Her lab work revealed a mild troponin elevation, 0.030 and elevated triglycerides at 207. Vascular Neurology recommended further imaging and neurologic work-up. She was admitted to [facility] hospital medicine for further care.     Overview/Hospital Course:  She was evaluated by vascular Neurology.  He did not receive tPA.  He had an occluded left carotid artery but with good collateral circulation so no surgical intervention was indicated.  He did have an echo that showed evidence of PFO but this was felt to be an incidental finding and no further treatment is indicated at this time.  She was monitored for arhythmia  He did develop some mild depression.  She was started on Lexapro.  He was maintained on dual anti-platelet therapy with aspirin and Plavix.  Statin.  Blood pressure medicine regimen was titrated blood pressure control.  On the day of discharge he is stable and will be transferred to the rehab unit.    Interval History:  Blood pressure much better controlled.  Still awaiting insurance authorization for rehab    Review of Systems  Objective:     Vital Signs (Most Recent):  Temp:  98.3 °F (36.8 °C) (08/27/20 0733)  Pulse: 99 (08/27/20 0733)  Resp: 17 (08/27/20 0733)  BP: (!) 141/80 (08/27/20 0733)  SpO2: 97 % (08/27/20 0733) Vital Signs (24h Range):  Temp:  [97.2 °F (36.2 °C)-98.4 °F (36.9 °C)] 98.3 °F (36.8 °C)  Pulse:  [] 99  Resp:  [16-17] 17  SpO2:  [95 %-97 %] 97 %  BP: (122-141)/(69-80) 141/80     Weight: 97.5 kg (214 lb 15.2 oz)  Body mass index is 33.67 kg/m².    Intake/Output Summary (Last 24 hours) at 8/27/2020 0941  Last data filed at 8/27/2020 0337  Gross per 24 hour   Intake 480 ml   Output 900 ml   Net -420 ml      Physical Exam        Assessment/Plan:      * Cerebrovascular accident (CVA) due to embolism  Presents with aphasia and right side weakness 3/5  - now improving      CT head nonacute  MRI of brain: Several foci of diffusion involving the left cerebral hemisphere (frontal, parietal, temporal lobes and left basal ganglia) in keeping with recent small infarcts, potentially embolic in nature.  CTA head and neck: Occlusion of the left proximal internal carotid artery with collateral filling/reconstitution intracranially within the cavernous carotid portion.  Carotid US: occluded left ICA  Echo with bubble: patent PFO with + Matias's sign  Atorvastatin 40mg daily  ASA 81mg daily and plavix 75mg daily x3 months at least  - TSH wnl, EKG NSR, A1c 5.3, , B12 wnl  - PT/OT/SLP: recommend IPR        PFO (patent foramen ovale)  No immediate intervention. Will need follow up as OP      Carotid occlusion, left    Has collateral flow. No intervention at this point    Stroke    As above.    Hyperlipemia  Not on home statin  Lipid levels WNL with elevated triglyceride noted  Start atorvastatin 40mg      Hypertension  Presently on  Amlodipine, benazapril, aldactone                   VTE Risk Mitigation (From admission, onward)         Ordered     IP VTE LOW RISK PATIENT  Once      08/23/20 0016                Discharge Planning   ANN-MARIE: 8/27/2020     Code Status: Full Code    Is the patient medically ready for discharge?: Yes    Reason for patient still in hospital (select all that apply): PT / OT recommendations  Discharge Plan A: Home Health   Discharge Delays: None known at this time              Nieves Velazquez MD  Department of Hospital Medicine   [facility]

## 2020-09-02 NOTE — HOSPITAL COURSE
She was evaluated by vascular Neurology.  He did not receive tPA.  He had an occluded left carotid artery but with good collateral circulation so no surgical intervention was indicated.  He did have an echo that showed evidence of PFO but this was felt to be an incidental finding and no further treatment is indicated at this time.  She was monitored for arhythmia  He did develop some mild depression.  She was started on Lexapro.  He was maintained on dual anti-platelet therapy with aspirin and Plavix.  Statin.  Blood pressure medicine regimen was titrated blood pressure control.  On the day of discharge he is stable and will be transferred to the rehab unit.

## 2020-09-02 NOTE — DISCHARGE SUMMARY
[facility]  Discharge Summary      Patient Name: Rosalia Fagan  MRN: [id number]  Admission Date: 8/22/2020  Hospital Length of Stay: 3 days  Discharge Date and Time: 8/27/2020  4:18 PM  Attending Physician: No att. providers found   Discharging Provider: Nieves Velazquez MD  Primary Care Provider: Barbara Hameed NP      HPI:   Ms. Hendricks is a 59-year-old female with a history of hypertension, hyperlipidemia, COPD, chronic back pain who presented to [place]  emergency department with c/o right-sided weakness for 2 days. She initially presented to [facility] ED for the same but refused to be transferred and left AMA. Head CT was negative. She has aphasia and decreased strength 3/5 in the right upper and lower extremity as well as a right sided facial droop. Her lab work revealed a mild troponin elevation, 0.030 and elevated triglycerides at 207. Vascular Neurology recommended further imaging and neurologic work-up. She was admitted to [facility] hospital medicine for further care.     * No surgery found *      Hospital Course:   She was evaluated by vascular Neurology.  He did not receive tPA.  He had an occluded left carotid artery but with good collateral circulation so no surgical intervention was indicated.  He did have an echo that showed evidence of PFO but this was felt to be an incidental finding and no further treatment is indicated at this time.  She was monitored for arhythmia  He did develop some mild depression.  She was started on Lexapro.  He was maintained on dual anti-platelet therapy with aspirin and Plavix.  Statin.  Blood pressure medicine regimen was titrated blood pressure control.  On the day of discharge he is stable and will be transferred to the rehab unit.     Consults:   Consults (From admission, onward)        Status Ordering Provider     Inpatient consult to Neurology  Once     Provider:  (Not yet assigned)    Completed  MADIE BARBER     Inpatient consult to Registered Dietitian/Nutritionist  Once     Provider:  (Not yet assigned)    Completed RAMBO HERNANDEZ          * Cerebrovascular accident (CVA) due to embolism  Presents with aphasia and right side weakness 3/5  - now improving      CT head nonacute  MRI of brain: Several foci of diffusion involving the left cerebral hemisphere (frontal, parietal, temporal lobes and left basal ganglia) in keeping with recent small infarcts, potentially embolic in nature.  CTA head and neck: Occlusion of the left proximal internal carotid artery with collateral filling/reconstitution intracranially within the cavernous carotid portion.  Carotid US: occluded left ICA  Echo with bubble: patent PFO with + Matias's sign  Atorvastatin 40mg daily  ASA 81mg daily and plavix 75mg daily x3 months at least  - TSH wnl, EKG NSR, A1c 5.3, , B12 wnl  - PT/OT/SLP: recommend IPR        PFO (patent foramen ovale)  No immediate intervention. Will need follow up as OP      Carotid occlusion, left    Has collateral flow. No intervention at this point    Stroke    As above.    Hyperlipemia  Not on home statin  Lipid levels WNL with elevated triglyceride noted  Start atorvastatin 40mg      Hypertension  Presently on  Amlodipine, benazapril, aldactone                   Final Active Diagnoses:    Diagnosis Date Noted POA    PRINCIPAL PROBLEM:  Cerebrovascular accident (CVA) due to embolism [I63.9] 08/22/2020 Yes    Carotid occlusion, left [I65.22] 08/25/2020 Yes    PFO (patent foramen ovale) [Q21.1] 08/25/2020 Not Applicable    Stroke [I63.9] 08/23/2020 Yes    Hypertension [I10]  Yes    Hyperlipemia [E78.5]  Yes      Problems Resolved During this Admission:       Discharged Condition: good    Disposition: Rehab Facility    Follow Up:    Patient Instructions:      Activity as tolerated           Pending Diagnostic Studies:     None         Medications:  Transfer Medications (for  Discharge Readmit only):   No current facility-administered medications for this encounter.      No current outpatient medications on file.     Facility-Administered Medications Ordered in Other Encounters   Medication Dose Route Frequency Provider Last Rate Last Dose    acetaminophen tablet 650 mg  650 mg Oral Q6H PRN Polly Mcfarland NP        albuterol-ipratropium 2.5 mg-0.5 mg/3 mL nebulizer solution 3 mL  3 mL Nebulization Q6H PRN Polly Mcfarland NP        amLODIPine tablet 10 mg  10 mg Oral Daily Steven H. MD Duncan   10 mg at 09/01/20 0823    aspirin EC tablet 81 mg  81 mg Oral Daily Steven H. MD Duncan   81 mg at 09/01/20 0823    atorvastatin tablet 40 mg  40 mg Oral Daily Steven H. MD Duncan   40 mg at 09/01/20 0823    benazepriL tablet 20 mg  20 mg Oral Daily Steven H. MD Duncan   20 mg at 09/01/20 0823    clopidogreL tablet 75 mg  75 mg Oral Daily Steven H. MD Duncan   75 mg at 09/01/20 0823    cyanocobalamin tablet 1,000 mcg  1,000 mcg Oral Daily Steven H. MD Duncan   1,000 mcg at 09/01/20 0823    cyclobenzaprine tablet 10 mg  10 mg Oral TID PRN Steven H. MD Duncan   10 mg at 09/01/20 1957    escitalopram oxalate tablet 5 mg  5 mg Oral Daily Steven H. MD Duncan   5 mg at 09/01/20 0823    hydrOXYzine pamoate capsule 25 mg  25 mg Oral Q8H PRN Deejay Mayers NP   25 mg at 09/01/20 1957    magnesium hydroxide 400 mg/5 ml suspension 2,400 mg  30 mL Oral Daily PRN Polly Mcfarland, NP        melatonin tablet 6 mg  6 mg Oral Nightly PRN Polly Mcfarland NP        multivitamin tablet  1 tablet Oral Daily Steven H. MD Duncan   1 tablet at 09/01/20 0824    ondansetron disintegrating tablet 8 mg  8 mg Oral Q8H PRN Polly Mcfarland, CHAN        polyethylene glycol packet 17 g  17 g Oral BID PRN Polly Mcfarland NP        spironolactone tablet 25 mg  25 mg Oral Daily Steven H. MD Duncan   25 mg at 09/01/20 0824    triamcinolone acetonide 0.5% cream   Topical (Top) BID Deejay Mayers NP            Indwelling Lines/Drains at time of discharge:   Lines/Drains/Airways     None                 Time spent on the discharge of patient: 40 minutes  Patient was seen and examined on the date of discharge and determined to be suitable for discharge.         Nieves Velazquez MD  Department of Hospital Medicine  [facility]

## 2020-09-02 NOTE — ASSESSMENT & PLAN NOTE
Presents with aphasia and right side weakness 3/5  - now improving      CT head nonacute  MRI of brain: Several foci of diffusion involving the left cerebral hemisphere (frontal, parietal, temporal lobes and left basal ganglia) in keeping with recent small infarcts, potentially embolic in nature.  CTA head and neck: Occlusion of the left proximal internal carotid artery with collateral filling/reconstitution intracranially within the cavernous carotid portion.  Carotid US: occluded left ICA  Echo with bubble: patent PFO with + Matias's sign  Atorvastatin 40mg daily  ASA 81mg daily and plavix 75mg daily x3 months at least  - TSH wnl, EKG NSR, A1c 5.3, , B12 wnl  - PT/OT/SLP: recommend IPR

## 2020-09-10 PROBLEM — Z78.9 DECREASED ACTIVITIES OF DAILY LIVING (ADL): Status: ACTIVE | Noted: 2020-09-10

## 2020-09-10 PROBLEM — Z74.09 IMPAIRED FUNCTIONAL MOBILITY, BALANCE, AND ENDURANCE: Status: ACTIVE | Noted: 2020-09-10

## 2020-09-10 PROBLEM — R41.3 POOR SHORT TERM MEMORY: Status: ACTIVE | Noted: 2020-09-10

## 2020-09-10 PROBLEM — R27.9 LACK OF COORDINATION: Status: ACTIVE | Noted: 2020-09-10

## 2020-09-10 PROBLEM — R47.89 WORD FINDING DIFFICULTY: Status: ACTIVE | Noted: 2020-09-10

## 2020-09-10 PROBLEM — M62.81 MUSCLE WEAKNESS OF LOWER EXTREMITY: Status: ACTIVE | Noted: 2020-09-10

## 2020-09-10 PROBLEM — R29.898 RIGHT ARM WEAKNESS: Status: ACTIVE | Noted: 2020-09-10

## 2020-09-10 PROBLEM — I69.320 APHASIA S/P CVA: Status: ACTIVE | Noted: 2020-09-10

## 2020-09-10 PROBLEM — R26.9 ABNORMALITY OF GAIT FOLLOWING CEREBROVASCULAR ACCIDENT (CVA): Status: ACTIVE | Noted: 2020-09-10

## 2020-09-10 PROBLEM — R29.898 DECREASED GRIP STRENGTH OF RIGHT HAND: Status: ACTIVE | Noted: 2020-09-10

## 2020-09-10 PROBLEM — I69.319 COGNITIVE DEFICIT AS LATE EFFECT OF CEREBROVASCULAR ACCIDENT (CVA): Status: ACTIVE | Noted: 2020-09-10

## 2020-09-10 PROBLEM — I69.398 ABNORMALITY OF GAIT FOLLOWING CEREBROVASCULAR ACCIDENT (CVA): Status: ACTIVE | Noted: 2020-09-10

## 2020-09-10 PROBLEM — Z78.9 IMPAIRED INSTRUMENTAL ACTIVITIES OF DAILY LIVING (IADL): Status: ACTIVE | Noted: 2020-09-10

## 2020-09-15 PROBLEM — I63.9 STROKE: Status: RESOLVED | Noted: 2020-08-23 | Resolved: 2020-09-15

## 2020-09-15 PROBLEM — I63.9 CVA (CEREBRAL VASCULAR ACCIDENT): Status: RESOLVED | Noted: 2020-08-27 | Resolved: 2020-09-15

## 2020-10-09 ENCOUNTER — HOSPITAL ENCOUNTER (INPATIENT)
Facility: HOSPITAL | Age: 60
LOS: 3 days | Discharge: HOME | DRG: 064 | End: 2020-10-12
Attending: EMERGENCY MEDICINE | Admitting: PSYCHIATRY & NEUROLOGY
Payer: MEDICAID

## 2020-10-09 DIAGNOSIS — I63.9 CVA (CEREBRAL VASCULAR ACCIDENT): ICD-10-CM

## 2020-10-09 DIAGNOSIS — I63.512 ACUTE ISCHEMIC LEFT MCA STROKE: ICD-10-CM

## 2020-10-09 DIAGNOSIS — I63.512 ACUTE ISCHEMIC LEFT MIDDLE CEREBRAL ARTERY (MCA) STROKE: ICD-10-CM

## 2020-10-09 PROCEDURE — 99285 EMERGENCY DEPT VISIT HI MDM: CPT | Mod: ,,, | Performed by: EMERGENCY MEDICINE

## 2020-10-09 PROCEDURE — 99285 PR EMERGENCY DEPT VISIT,LEVEL V: ICD-10-PCS | Mod: ,,, | Performed by: EMERGENCY MEDICINE

## 2020-10-09 PROCEDURE — 12000002 HC ACUTE/MED SURGE SEMI-PRIVATE ROOM

## 2020-10-09 PROCEDURE — 99285 EMERGENCY DEPT VISIT HI MDM: CPT | Mod: 25

## 2020-10-09 RX ADMIN — IOHEXOL 100 ML: 350 INJECTION, SOLUTION INTRAVENOUS at 11:10

## 2020-10-10 PROBLEM — G93.6 CYTOTOXIC BRAIN EDEMA: Status: ACTIVE | Noted: 2020-10-10

## 2020-10-10 PROBLEM — I63.512 ACUTE ISCHEMIC LEFT MCA STROKE: Status: ACTIVE | Noted: 2020-08-27

## 2020-10-10 PROBLEM — Z92.82 S/P ADMN TPA IN DIFF FAC W/N LAST 24 HR BEF ADM TO CRNT FAC: Status: ACTIVE | Noted: 2020-10-10

## 2020-10-10 PROBLEM — Z78.9 IMPAIRED MOBILITY AND ADLS: Status: ACTIVE | Noted: 2020-09-10

## 2020-10-10 LAB
ABO + RH BLD: NORMAL
ALBUMIN SERPL BCP-MCNC: 3.7 G/DL (ref 3.5–5.2)
ALP SERPL-CCNC: 126 U/L (ref 55–135)
ALT SERPL W/O P-5'-P-CCNC: 29 U/L (ref 10–44)
ANION GAP SERPL CALC-SCNC: 12 MMOL/L (ref 8–16)
AST SERPL-CCNC: 27 U/L (ref 10–40)
BASOPHILS # BLD AUTO: 0.14 K/UL (ref 0–0.2)
BASOPHILS NFR BLD: 0.8 % (ref 0–1.9)
BILIRUB SERPL-MCNC: 0.4 MG/DL (ref 0.1–1)
BILIRUB UR QL STRIP: NEGATIVE
BLD GP AB SCN CELLS X3 SERPL QL: NORMAL
BUN SERPL-MCNC: 21 MG/DL (ref 6–20)
CALCIUM SERPL-MCNC: 8.9 MG/DL (ref 8.7–10.5)
CHLORIDE SERPL-SCNC: 105 MMOL/L (ref 95–110)
CHOLEST SERPL-MCNC: 138 MG/DL (ref 120–199)
CHOLEST/HDLC SERPL: 2.7 {RATIO} (ref 2–5)
CLARITY UR REFRACT.AUTO: CLEAR
CO2 SERPL-SCNC: 21 MMOL/L (ref 23–29)
COLOR UR AUTO: YELLOW
CREAT SERPL-MCNC: 0.9 MG/DL (ref 0.5–1.4)
DIFFERENTIAL METHOD: ABNORMAL
EOSINOPHIL # BLD AUTO: 0.2 K/UL (ref 0–0.5)
EOSINOPHIL NFR BLD: 1 % (ref 0–8)
ERYTHROCYTE [DISTWIDTH] IN BLOOD BY AUTOMATED COUNT: 13.2 % (ref 11.5–14.5)
EST. GFR  (AFRICAN AMERICAN): >60 ML/MIN/1.73 M^2
EST. GFR  (NON AFRICAN AMERICAN): >60 ML/MIN/1.73 M^2
ESTIMATED AVG GLUCOSE: 108 MG/DL (ref 68–131)
GLUCOSE SERPL-MCNC: 128 MG/DL (ref 70–110)
GLUCOSE SERPL-MCNC: 133 MG/DL (ref 70–110)
GLUCOSE UR QL STRIP: NEGATIVE
HBA1C MFR BLD HPLC: 5.4 % (ref 4–5.6)
HCT VFR BLD AUTO: 42.8 % (ref 37–48.5)
HDLC SERPL-MCNC: 51 MG/DL (ref 40–75)
HDLC SERPL: 37 % (ref 20–50)
HGB BLD-MCNC: 13.8 G/DL (ref 12–16)
HGB UR QL STRIP: ABNORMAL
IMM GRANULOCYTES # BLD AUTO: 0.07 K/UL (ref 0–0.04)
IMM GRANULOCYTES NFR BLD AUTO: 0.4 % (ref 0–0.5)
KETONES UR QL STRIP: NEGATIVE
LDLC SERPL CALC-MCNC: 72 MG/DL (ref 63–159)
LEUKOCYTE ESTERASE UR QL STRIP: NEGATIVE
LYMPHOCYTES # BLD AUTO: 2.6 K/UL (ref 1–4.8)
LYMPHOCYTES NFR BLD: 14.1 % (ref 18–48)
MAGNESIUM SERPL-MCNC: 1.7 MG/DL (ref 1.6–2.6)
MCH RBC QN AUTO: 30 PG (ref 27–31)
MCHC RBC AUTO-ENTMCNC: 32.2 G/DL (ref 32–36)
MCV RBC AUTO: 93 FL (ref 82–98)
MICROSCOPIC COMMENT: NORMAL
MONOCYTES # BLD AUTO: 1.3 K/UL (ref 0.3–1)
MONOCYTES NFR BLD: 7.1 % (ref 4–15)
NEUTROPHILS # BLD AUTO: 14.2 K/UL (ref 1.8–7.7)
NEUTROPHILS NFR BLD: 76.6 % (ref 38–73)
NITRITE UR QL STRIP: NEGATIVE
NONHDLC SERPL-MCNC: 87 MG/DL
NRBC BLD-RTO: 0 /100 WBC
PH UR STRIP: 6 [PH] (ref 5–8)
PHOSPHATE SERPL-MCNC: 4.6 MG/DL (ref 2.7–4.5)
PLATELET # BLD AUTO: 294 K/UL (ref 150–350)
PMV BLD AUTO: 10.1 FL (ref 9.2–12.9)
POCT GLUCOSE: 104 MG/DL (ref 70–110)
POCT GLUCOSE: 128 MG/DL (ref 70–110)
POTASSIUM SERPL-SCNC: 4.3 MMOL/L (ref 3.5–5.1)
PROCALCITONIN SERPL IA-MCNC: 0.02 NG/ML
PROT SERPL-MCNC: 7.2 G/DL (ref 6–8.4)
PROT UR QL STRIP: NEGATIVE
RBC # BLD AUTO: 4.6 M/UL (ref 4–5.4)
RBC #/AREA URNS AUTO: 2 /HPF (ref 0–4)
SODIUM SERPL-SCNC: 138 MMOL/L (ref 136–145)
SP GR UR STRIP: 1.02 (ref 1–1.03)
T4 FREE SERPL-MCNC: 0.89 NG/DL (ref 0.71–1.51)
TRIGL SERPL-MCNC: 75 MG/DL (ref 30–150)
TSH SERPL DL<=0.005 MIU/L-ACNC: 4.4 UIU/ML (ref 0.4–4)
URN SPEC COLLECT METH UR: ABNORMAL
WBC # BLD AUTO: 18.47 K/UL (ref 3.9–12.7)
WBC #/AREA URNS AUTO: 1 /HPF (ref 0–5)

## 2020-10-10 PROCEDURE — 83036 HEMOGLOBIN GLYCOSYLATED A1C: CPT

## 2020-10-10 PROCEDURE — 84100 ASSAY OF PHOSPHORUS: CPT

## 2020-10-10 PROCEDURE — 87040 BLOOD CULTURE FOR BACTERIA: CPT | Mod: 59

## 2020-10-10 PROCEDURE — 84145 PROCALCITONIN (PCT): CPT

## 2020-10-10 PROCEDURE — 80061 LIPID PANEL: CPT

## 2020-10-10 PROCEDURE — 85025 COMPLETE CBC W/AUTO DIFF WBC: CPT

## 2020-10-10 PROCEDURE — 97165 OT EVAL LOW COMPLEX 30 MIN: CPT

## 2020-10-10 PROCEDURE — 20000000 HC ICU ROOM

## 2020-10-10 PROCEDURE — 25000003 PHARM REV CODE 250: Performed by: NURSE PRACTITIONER

## 2020-10-10 PROCEDURE — 25000003 PHARM REV CODE 250: Performed by: PHYSICIAN ASSISTANT

## 2020-10-10 PROCEDURE — 97161 PT EVAL LOW COMPLEX 20 MIN: CPT

## 2020-10-10 PROCEDURE — 94761 N-INVAS EAR/PLS OXIMETRY MLT: CPT

## 2020-10-10 PROCEDURE — 99233 PR SUBSEQUENT HOSPITAL CARE,LEVL III: ICD-10-PCS | Mod: ,,, | Performed by: PSYCHIATRY & NEUROLOGY

## 2020-10-10 PROCEDURE — 92610 EVALUATE SWALLOWING FUNCTION: CPT

## 2020-10-10 PROCEDURE — 97535 SELF CARE MNGMENT TRAINING: CPT

## 2020-10-10 PROCEDURE — 25500020 PHARM REV CODE 255: Performed by: EMERGENCY MEDICINE

## 2020-10-10 PROCEDURE — 99223 PR INITIAL HOSPITAL CARE,LEVL III: ICD-10-PCS | Mod: ,,, | Performed by: NURSE PRACTITIONER

## 2020-10-10 PROCEDURE — 81001 URINALYSIS AUTO W/SCOPE: CPT

## 2020-10-10 PROCEDURE — 99223 1ST HOSP IP/OBS HIGH 75: CPT | Mod: ,,, | Performed by: NURSE PRACTITIONER

## 2020-10-10 PROCEDURE — 97802 MEDICAL NUTRITION INDIV IN: CPT

## 2020-10-10 PROCEDURE — 83735 ASSAY OF MAGNESIUM: CPT

## 2020-10-10 PROCEDURE — 99233 SBSQ HOSP IP/OBS HIGH 50: CPT | Mod: ,,, | Performed by: PSYCHIATRY & NEUROLOGY

## 2020-10-10 PROCEDURE — 80053 COMPREHEN METABOLIC PANEL: CPT

## 2020-10-10 PROCEDURE — 86901 BLOOD TYPING SEROLOGIC RH(D): CPT

## 2020-10-10 PROCEDURE — 84443 ASSAY THYROID STIM HORMONE: CPT

## 2020-10-10 PROCEDURE — 84439 ASSAY OF FREE THYROXINE: CPT

## 2020-10-10 RX ORDER — SODIUM CHLORIDE 0.9 % (FLUSH) 0.9 %
10 SYRINGE (ML) INJECTION
Status: DISCONTINUED | OUTPATIENT
Start: 2020-10-10 | End: 2020-10-12 | Stop reason: HOSPADM

## 2020-10-10 RX ORDER — ESCITALOPRAM OXALATE 5 MG/1
5 TABLET ORAL DAILY
Status: DISCONTINUED | OUTPATIENT
Start: 2020-10-10 | End: 2020-10-12 | Stop reason: HOSPADM

## 2020-10-10 RX ORDER — ATORVASTATIN CALCIUM 20 MG/1
40 TABLET, FILM COATED ORAL DAILY
Status: DISCONTINUED | OUTPATIENT
Start: 2020-10-10 | End: 2020-10-12 | Stop reason: HOSPADM

## 2020-10-10 RX ORDER — AMOXICILLIN 250 MG
1 CAPSULE ORAL 2 TIMES DAILY
Status: DISCONTINUED | OUTPATIENT
Start: 2020-10-10 | End: 2020-10-12 | Stop reason: HOSPADM

## 2020-10-10 RX ORDER — SODIUM CHLORIDE 9 MG/ML
INJECTION, SOLUTION INTRAVENOUS CONTINUOUS
Status: DISCONTINUED | OUTPATIENT
Start: 2020-10-10 | End: 2020-10-10

## 2020-10-10 RX ORDER — LABETALOL HCL 20 MG/4 ML
10 SYRINGE (ML) INTRAVENOUS EVERY 6 HOURS PRN
Status: DISCONTINUED | OUTPATIENT
Start: 2020-10-10 | End: 2020-10-12 | Stop reason: HOSPADM

## 2020-10-10 RX ADMIN — ATORVASTATIN CALCIUM 40 MG: 20 TABLET, FILM COATED ORAL at 09:10

## 2020-10-10 RX ADMIN — SODIUM CHLORIDE: 0.9 INJECTION, SOLUTION INTRAVENOUS at 01:10

## 2020-10-10 RX ADMIN — DOCUSATE SODIUM 50MG AND SENNOSIDES 8.6MG 1 TABLET: 8.6; 5 TABLET, FILM COATED ORAL at 09:10

## 2020-10-10 RX ADMIN — ESCITALOPRAM OXALATE 5 MG: 5 TABLET, FILM COATED ORAL at 09:10

## 2020-10-10 NOTE — PT/OT/SLP EVAL
"Occupational Therapy   Evaluation    Name: Rosalia Fagan  MRN: [id number]  Admitting Diagnosis:  Acute ischemic left MCA stroke      Recommendations:     Discharge Recommendations: rehabilitation facility  Discharge Equipment Recommendations:  other (see comments)(TBD)  Barriers to discharge:  Inaccessible home environment, Decreased caregiver support    Assessment:     Rosalia Fagan is a 60 y.o. female with a medical diagnosis of Acute ischemic left MCA stroke.  Upon evaluation, pt was within the 24 hour window of receiving tPA, therefore OT did not facilitate pt with transfer or ambulation.  Of note, pt was found sitting UIC with assistance from nursing.  Pt performed grooming tasks while UIC with set-up assistance.  She presents with the following deficits. Performance deficits affecting function: weakness, impaired endurance, impaired self care skills, impaired functional mobilty, gait instability, impaired balance.      Rehab Prognosis: Good; patient would benefit from acute skilled OT services to address these deficits and reach maximum level of function.       Plan:     Patient to be seen 5 x/week to address the above listed problems via self-care/home management, therapeutic activities, therapeutic exercises  · Plan of Care Expires: 11/10/20  · Plan of Care Reviewed with: patient, son    Subjective   Pt's son stated, "She looks so much better since the tPA."  Chief Complaint: no complaints  Patient/Family Comments/goals: to get stronger and return home    Occupational Profile:  Living Environment: Pt is currently living with her son in a [facility] with 0 JARED, which has a standard toilet and a tub/shower combo.  Pt had lived alone in a trailer with 4 JARED with [facility] (3 ft apart that son built), which also has a tub/shower combo and a standard toilet.  Pt's son reports pt has a hand-held shower head.   Pt reports no recent falls.  Previous level of function: IND with ADLs and functional mobility, but has " required RW or rollator to ambulate for past 2 months.   Roles and Routines: mother   Equipment Used at Home:  other (see comments), walker, rolling, rollator(Pt owns a shower chair, bath bench, and bedside commode but does not use them.)  Assistance upon Discharge: Her children     Pain/Comfort:  Pain Rating 1: 0/10  Pain Rating Post-Intervention 1: 0/10    Patients cultural, spiritual, Yazidism conflicts given the current situation: no    Objective:     Communicated with: RN and PT prior to session.  Patient found up in chair with blood pressure cuff, peripheral IV, telemetry, pulse ox (continuous) with her son present upon OT entry to room.    General Precautions: Standard, aspiration, fall   Orthopedic Precautions:N/A   Braces: N/A     Occupational Performance:    Bed Mobility:    · Deferred due to pt being UIC at beginning and at end of session.    Functional Mobility/Transfers:  · Deferred due to being within 24-hour tPA window.    Activities of Daily Living:  · Grooming: setup assistance of tray table to  brush her hair, brush her teeth, and wash her face while UIC.  Pt's gums were bleeding; therapist intially instructed pt to be more gentle with brushing but then instructed pt to stop.  · Lower Body Dressing: Pt was able to reach/adjust B socks while sitting UIC.    Cognitive/Visual Perceptual:  Cognitive/Psychosocial Skills:     -       Oriented to: Person, Place, Time and Situation   -       Follows Commands/attention:Follows one-step commands  -       Communication: clear/fluent  -       Memory: No Deficits noted  -       Safety awareness/insight to disability: intact   -       Mood/Affect/Coping skills/emotional control: Appropriate to situation    Physical Exam:  Dominant hand:    -       R-handed  Upper Extremity Range of Motion:     -       Right Upper Extremity: WFL  -       Left Upper Extremity: WFL  Upper Extremity Strength:    -       Right Upper Extremity: WFL  -       Left Upper Extremity:  WFL   Strength:    -       Right Upper Extremity: WFL but composite grasp noticeably weaker than L hand.  -       Left Upper Extremity: WFL  Fine Motor Coordination:    -       Intact  Left hand, finger to nose, Right hand, finger to nose, Left hand thumb/finger opposition skills and Right hand thumb/finger opposition skills    AMPAC 6 Click ADL:  AMPAC Total Score: 18    Treatment & Education:  - Pt and her son edu on role of OT, POC.    - Self care tasks completed-- as noted above     Education:    Patient left up in chair with all lines intact, call button in reach and her son present    GOALS:   Multidisciplinary Problems     Occupational Therapy Goals        Problem: Occupational Therapy Goal    Goal Priority Disciplines Outcome Interventions   Occupational Therapy Goal     OT, PT/OT Ongoing, Progressing    Description: Goals to be met by: 10/24/2020    Patient will increase functional independence with ADLs by performing:    UE Dressing with Set-up Assistance.  LE Dressing (mireille/doff socks) with Set-up Assistance.  Grooming while standing at sink with Contact Guard Assistance.  Toileting from toilet with Contact Guard Assistance for hygiene and clothing management.   Supine to sit with Stand-by Assistance.  Sit to stand transfer with Contact Guard Assistance with LRAD.  Toilet transfer to toilet with Contact Guard Assistance with LRAD.                     History:     Past Medical History:   Diagnosis Date    Adrenal adenoma, left 05/2017    Chronic anxiety     Chronic back pain     Closed L3 vertebral fracture 12/2017    fell 2 weeks after back surgery     COPD (chronic obstructive pulmonary disease)     Degenerative joint disease (DJD) of lumbar spine     Diverticulosis of colon     Elevated liver enzymes     Encounter for blood transfusion     Fibroids, intramural 02/2016    GERD (gastroesophageal reflux disease)     History of shingles 02/2016    Right flank    Hyperlipemia      Hypertension     Ischemic colitis 10/2017    Lumbar post-laminectomy syndrome     Menopausal and female climacteric states     Muscle contraction headache syndrome     Pancreatitis 04/2017    PTSD (post-traumatic stress disorder)     daughter age 7 killed in the driveway hit by a drunk     Spinal stenosis of lumbar region at multiple levels     Varicose veins of both lower extremities with inflammation     Vitamin D deficiency          Past Surgical History:   Procedure Laterality Date    APPENDECTOMY  1992    BREAST BIOPSY Right 1992    [place] [facility]    CHOLECYSTECTOMY  1981    [facility]    COLONOSCOPY N/A 10/10/2017    Procedure: COLONOSCOPY;  Surgeon: Mike Narvaez MD;  Location: [facility];  Service: Endoscopy;  Laterality: N/A;    COLONOSCOPY  10/05/2018    CORONARY ANGIOPLASTY  03/31/2016    EPIDURAL STEROID INJECTION INTO LUMBAR SPINE  11/2014    [facility]    ESOPHAGOGASTRODUODENOSCOPY N/A 10/5/2018    Procedure: EGD (ESOPHAGOGASTRODUODENOSCOPY);  Surgeon: Mike Narvaez MD;  Location: [facility];  Service: Endoscopy;  Laterality: N/A;    LUMBAR LAMINECTOMY WITH FUSION  11/2017    TOTAL ABDOMINAL HYSTERECTOMY W/ BILATERAL SALPINGOOPHORECTOMY  02/2016       Time Tracking:     OT Date of Treatment: 10/10/20  OT Start Time: 1051  OT Stop Time: 1118  OT Total Time (min): 27 min (co-eval with PT)    Billable Minutes:Evaluation 15 min.  Self Care/Home Management 12 min.    Beau Rosales, OT  10/10/2020

## 2020-10-10 NOTE — SUBJECTIVE & OBJECTIVE
Past Medical History:   Diagnosis Date    Adrenal adenoma, left 05/2017    Chronic anxiety     Chronic back pain     Closed L3 vertebral fracture 12/2017    fell 2 weeks after back surgery     COPD (chronic obstructive pulmonary disease)     Degenerative joint disease (DJD) of lumbar spine     Diverticulosis of colon     DJD (degenerative joint disease)     Elevated liver enzymes     Encounter for blood transfusion     Fibroids, intramural 02/2016    GERD (gastroesophageal reflux disease)     History of shingles 02/2016    Right flank    Hyperlipemia     Hypertension     Ischemic colitis 10/2017    Lumbar post-laminectomy syndrome     Menopausal and female climacteric states     Muscle contraction headache syndrome     Pancreatitis 04/2017    PTSD (post-traumatic stress disorder)     daughter age 7 killed in the driveway hit by a drunk     Spinal stenosis of lumbar region at multiple levels     Varicose veins of both lower extremities with inflammation     Vitamin D deficiency      Past Surgical History:   Procedure Laterality Date    APPENDECTOMY  1992    [place]OPSY Right 1992    [place] [facility]    [place]    [facility] [place] COLONOSCOPY N/A 10/10/2017    Procedure: COLONOSCOPY;  Surgeon: Mike Narvaez MD;  Location: [facility] ENDO;  Service: Endoscopy;  Laterality: N/A;    COLONOSCOPY  10/05/2018    CORONARY ANGIOPLASTY  03/31/2016    EPIDURAL STEROID INJECTION INTO LUMBAR SPINE  11/2014    [facility]    ESOPHAGOGASTRODUODENOSCOPY N/A 10/5/2018    Procedure: EGD (ESOPHAGOGASTRODUODENOSCOPY);  Surgeon: Mike Narvaez MD;  Location[facility];  Service: Endoscopy;  Laterality: N/A;    LUMBAR LAMINECTOMY WITH FUSION  11/2017    TOTAL ABDOMINAL HYSTERECTOMY W/ BILATERAL SALPINGOOPHORECTOMY  02/2016      Current Facility-Administered Medications on File Prior to Encounter   Medication Dose Route Frequency Provider Last Rate Last Dose    [COMPLETED] alteplase (ACTIVASE)  injection 81 mg  81 mg Intravenous Once Leroy Hess MD 81 mL/hr at 10/09/20 2143 81 mg at 10/09/20 2143    [COMPLETED] alteplase (ACTIVASE) injection 9 mg  0.09 mg/kg Intravenous Once Leroy Hess MD   Stopped at 10/09/20 2142    [DISCONTINUED] 0.9%  NaCl infusion  50 mL Intravenous Once Lreoy Hess MD         Current Outpatient Medications on File Prior to Encounter   Medication Sig Dispense Refill    ALPRAZolam (XANAX) 0.5 MG tablet Take 1 tablet (0.5 mg total) by mouth 3 (three) times daily as needed for Anxiety. 90 tablet 5    amLODIPine (NORVASC) 10 MG tablet Take 1 tablet (10 mg total) by mouth once daily. 30 tablet 11    aspirin (ECOTRIN) 81 MG EC tablet Take 1 tablet (81 mg total) by mouth once daily. 30 tablet 0    atorvastatin (LIPITOR) 40 MG tablet Take 1 tablet (40 mg total) by mouth once daily. 30 tablet 11    benazepriL (LOTENSIN) 20 MG tablet Take 1 tablet (20 mg total) by mouth once daily. 30 tablet 11    clopidogreL (PLAVIX) 75 mg tablet Take 1 tablet (75 mg total) by mouth once daily. 30 tablet 11    cyanocobalamin (VITAMIN B-12) 1000 MCG tablet Take 1,000 mcg by mouth once daily.      cyclobenzaprine (FLEXERIL) 10 MG tablet TAKE 1 TABLET BY MOUTH THREE TIMES A DAY AS NEEDED FOR MUSCLE SPASMS 90 tablet 5    escitalopram oxalate (LEXAPRO) 5 MG Tab Take 1 tablet (5 mg total) by mouth once daily. 30 tablet 11    multivitamin capsule Take 1 capsule by mouth once daily.      spironolactone (ALDACTONE) 25 MG tablet Take 1 tablet (25 mg total) by mouth once daily. 30 tablet 11    triamcinolone acetonide 0.5% (KENALOG) 0.5 % Crea Apply 1 application topically 2 (two) times daily. 15 g 11      Allergies: Patient has no known allergies.    Family History   Problem Relation Age of Onset    Arthritis Mother         severe lumbar    Hyperlipidemia Mother     Hypertension Mother     Anxiety disorder Mother     Breast cancer Sister     Kidney cancer Brother     Cancer Maternal  "Aunt     Bladder Cancer Father      Social History     Tobacco Use    Smoking status: Former Smoker     Packs/day: 0.50     Years: 45.00     Pack years: 22.50     Types: Cigarettes, Cigars     Start date:      Quit date: 2020     Years since quittin.1    Smokeless tobacco: Never Used    Tobacco comment: Pt enrolled in [employer] . Ambulatory referral to Smoking Cessation program.   Substance Use Topics    Alcohol use: No     Frequency: Never     Comment: "OCASSIONALLY"    Drug use: No     Review of Systems     Review of Symptoms:  Constitutional: Denies fevers, weight loss, chills, or weakness.  Eyes: Denies changes in vision.  ENT: Denies dysphagia, nasal discharge, ear pain or discharge.  Cardiovascular: Denies chest pain, palpitations, orthopnea, or claudication.  Respiratory: Denies shortness of breath, cough, hemoptysis, or wheezing.  GI: Denies nausea/vomitting, hematochezia, melena, abd pain, or changes in appetite.  : Denies dysuria, incontinence, or hematuria.  Musculoskeletal: Denies joint pain or myalgias. R weakness   Skin/breast: Denies rashes, lumps, lesions, or discharge.  Neurologic: Denies headache, dizziness, vertigo, or paresthesias. Speech difficulty   Psychiatric: Denies changes in mood or hallucinations.  Endocrine: Denies polyuria, polydipsia, heat/cold intolerance.  Hematologic/Lymph: Denies lymphadenopathy, easy bruising or easy bleeding.  Allergic/Immunologic: Denies rash, rhinitis.     Objective:     Vitals:    Temp: 98.2 °F (36.8 °C)  Pulse: 98  BP: 139/60  MAP (mmHg): 109  Resp: 18  SpO2: 96 %  O2 Device (Oxygen Therapy): room air    Temp  Min: 98.2 °F (36.8 °C)  Max: 98.3 °F (36.8 °C)  Pulse  Min: 85  Max: 102  BP  Min: 127/73  Max: 157/82  MAP (mmHg)  Min: 93  Max: 111  Resp  Min: 18  Max: 20  SpO2  Min: 96 %  Max: 99 %    No intake/output data recorded.           Physical Exam      Physical Exam:  GA: Alert, comfortable, no acute distress.   HEENT: No scleral " icterus or JVD.   Pulmonary: Clear to auscultation A/L.   Cardiac: RRR S1 & S2 w/o rubs/murmurs/gallops.   Abdominal: Bowel sounds present x 4.   Skin: No jaundice, rashes, or visible lesions.  Psych: Mental Status:  Alert oriented follows   Neuro:  --GCS: E4 V5 M6  --R facial droop, slurred speech   --Pupils 3mm, PERRL.   --Corneal reflex, gag, cough intact.  --LUE strength: 5/5  --RUE strength: 3/5  --LLE strength: 5/5  --RLE strength: 3/5    Unable to test gait due to level of consciousness.    Today I personally reviewed pertinent medications, lines/drains/airways, imaging, cardiology results, laboratory results, notably: CTA

## 2020-10-10 NOTE — PLAN OF CARE
[facility] Care Plan    POC reviewed with Rosalia Fagan at 0500. Pt verbalized understanding. Questions and concerns addressed. No acute events overnight. MICHELLE passed. Pt straight cathed for urinary retention. Pt progressing toward goals. Will continue to monitor. See below and flowsheets for full assessment and VS info.       Neuro:  Va Coma Scale  Best Eye Response: 4-->(E4) spontaneous  Best Motor Response: 6-->(M6) obeys commands  Best Verbal Response: 5-->(V5) oriented  Va Coma Scale Score: 15  Assessment Qualifiers: patient not sedated/intubated, no eye obstruction present        24hr Temp:  [98 °F (36.7 °C)-98.3 °F (36.8 °C)]     CV:   Rhythm: normal sinus rhythm  BP goals:   SBP < 180  MAP > 65    Resp:   O2 Device (Oxygen Therapy): room air       Plan: N/A    GI/:  MICHELLE Total Score: 20  Diet/Nutrition Received: NPO  Last Bowel Movement: 10/09/20  Voiding Characteristics: unable to void    Intake/Output Summary (Last 24 hours) at 10/10/2020 0439  Last data filed at 10/10/2020 0405  Gross per 24 hour   Intake 217.5 ml   Output 1500 ml   Net -1282.5 ml     Unmeasured Output  Stool Occurrence: 0    Labs/Accuchecks:  Recent Labs   Lab 10/10/20  0158   WBC 18.47*   RBC 4.60   HGB 13.8   HCT 42.8         Recent Labs   Lab 10/10/20  0158      K 4.3   CO2 21*      BUN 21*   CREATININE 0.9   ALKPHOS 126   ALT 29   AST 27   BILITOT 0.4      Recent Labs   Lab 10/09/20  2115   INR 1.1   APTT 30.9    No results for input(s): CPK, CPKMB, TROPONINI, MB in the last 168 hours.    Electrolytes: N/A - electrolytes WDL  Accuchecks: Q6H    Gtts:   sodium chloride 0.9% 75 mL/hr at 10/10/20 0405       LDA/Wounds:  Lines/Drains/Airways       Peripheral Intravenous Line                   Peripheral IV - Single Lumen 10/10/20 0010 20 G Left Antecubital less than 1 day         Peripheral IV - Single Lumen 10/10/20 0010 20 G Right Hand less than 1 day                  Wounds: No  Wound care consulted:  No

## 2020-10-10 NOTE — ED PROVIDER NOTES
Encounter Date: 10/9/2020       History     Chief Complaint   Patient presents with    Cerebrovascular Accident     +TPA -- poss IR -- CTAM on arrival     Patient is 60-year-old female with history of COPD, GERD, recent stroke in August who presents to the outside hospital with right facial droop, right sided weakness, and expressive aphasia 30 min prior to arrival.  At the outside hospital, he consulted vascular Neurology via telemedicine who initiated tPA, and was transferred here for possible IR treatment versus medical management with vascular Neurology.  Patient had a stroke in August with similar symptoms of right-sided weakness and aphasia for which she did not receive tPA, but recovered most her function.  Here, the patient does not really know why she is here.  At the outside hospital, patient had a headache, which is now resolved.  Patient denies fevers, chills, nausea, vomiting, diarrhea, constipation, back pain.        Review of patient's allergies indicates:  No Known Allergies  Past Medical History:   Diagnosis Date    Adrenal adenoma, left 05/2017    Chronic anxiety     Chronic back pain     Closed L3 vertebral fracture 12/2017    fell 2 weeks after back surgery     COPD (chronic obstructive pulmonary disease)     Degenerative joint disease (DJD) of lumbar spine     Diverticulosis of colon     Elevated liver enzymes     Encounter for blood transfusion     Fibroids, intramural 02/2016    GERD (gastroesophageal reflux disease)     History of shingles 02/2016    Right flank    Hyperlipemia     Hypertension     Ischemic colitis 10/2017    Lumbar post-laminectomy syndrome     Menopausal and female climacteric states     Muscle contraction headache syndrome     Pancreatitis 04/2017    PTSD (post-traumatic stress disorder)     daughter age 7 killed in the driveway hit by a drunk     Spinal stenosis of lumbar region at multiple levels     Varicose veins of both lower extremities  "with inflammation     Vitamin D deficiency      Past Surgical History:   Procedure Laterality Date    APPENDECTOMY  1992    BRE[place] BIOPSY Right 1992    [place] [facility]    [place]      [facility] [place]    COLONOSCOPY N/A 10/10/2017    Procedure: COLONOSCOPY;  Surgeon: Mike Narvaez MD;  Location: [facility] ENDO;  Service: Endoscopy;  Laterality: N/A;    COLONOSCOPY  10/05/2018    CORONARY ANGIOPLASTY  2016    EPIDURAL STEROID INJECTION INTO LUMBAR SPINE  2014    [facility]    ESOPHAGOGASTRODUODENOSCOPY N/A 10/5/2018    Procedure: EGD (ESOPHAGOGASTRODUODENOSCOPY);  Surgeon: Mike Narvaez MD;  Location: [facility] ENDO;  Service: Endoscopy;  Laterality: N/A;    LUMBAR LAMINECTOMY WITH FUSION  2017    TOTAL ABDOMINAL HYSTERECTOMY W/ BILATERAL SALPINGOOPHORECTOMY  2016     Family History   Problem Relation Age of Onset    Arthritis Mother         severe lumbar    Hyperlipidemia Mother     Hypertension Mother     Anxiety disorder Mother     Breast cancer Sister     Kidney cancer Brother     Cancer Maternal Aunt     Bladder Cancer Father      Social History     Tobacco Use    Smoking status: Former Smoker     Packs/day: 0.50     Years: 45.00     Pack years: 22.50     Types: Cigarettes, Cigars     Start date:      Quit date: 2020     Years since quittin.1    Smokeless tobacco: Never Used    Tobacco comment: Pt enrolled in [employer] . Ambulatory referral to Smoking Cessation program.   Substance Use Topics    Alcohol use: No     Frequency: Never     Comment: "OCASSIONALLY"    Drug use: No     Review of Systems   Constitutional: Negative for chills, diaphoresis, fatigue and fever.   HENT: Negative for congestion, postnasal drip, rhinorrhea and sore throat.    Respiratory: Negative for cough, chest tightness, shortness of breath and wheezing.    Cardiovascular: Negative for chest pain, palpitations and leg swelling.   Gastrointestinal: Negative for abdominal distention, " abdominal pain, constipation, diarrhea, nausea and vomiting.   Genitourinary: Negative for dysuria.   Skin: Negative for color change, pallor, rash and wound.   Neurological: Positive for facial asymmetry, weakness, numbness and headaches. Negative for dizziness.   All other systems reviewed and are negative.      Physical Exam     Initial Vitals [10/09/20 2331]   BP Pulse Resp Temp SpO2   (!) 154/100 102 20 98.2 °F (36.8 °C) 96 %      MAP       --         Physical Exam    Nursing note and vitals reviewed.  Constitutional: She appears well-developed and well-nourished. She is not diaphoretic. No distress.   HENT:   Head: Normocephalic and atraumatic.   Mouth/Throat: No oropharyngeal exudate.   Eyes: Conjunctivae and EOM are normal. Pupils are equal, round, and reactive to light. Right eye exhibits no discharge. Left eye exhibits no discharge. No scleral icterus.   Eyes are dilated, but reactive to light   Neck: Normal range of motion.   Cardiovascular: Normal rate and regular rhythm. Exam reveals no gallop and no friction rub.    No murmur heard.  Pulmonary/Chest: No respiratory distress. She has no wheezes. She has no rhonchi. She has no rales. She exhibits no tenderness.   Abdominal: Soft. Bowel sounds are normal. She exhibits no distension and no mass. There is no abdominal tenderness. There is no rebound and no guarding.   Neurological: She is alert and oriented to person, place, and time. She has normal strength. No sensory deficit. GCS score is 15. GCS eye subscore is 4. GCS verbal subscore is 5. GCS motor subscore is 6.   Patient does not active short, could not move the right side of her mouth.  Otherwise, cranial nerves intact.  Right upper extremity is 4/5 strength, right lower extremity is 3/5.  Left upper and lower extremities are 5/5 strength care patient does not endorse any sensory deficits.  Patient does not seem to have expressive or receptive aphasia.  Patient has normal finger-to-nose,  heel-to-shin.    NIH stroke scale of 7.   Skin: Skin is warm and dry. No erythema. No pallor.   Psychiatric: She has a normal mood and affect. Her behavior is normal. Judgment and thought content normal.         ED Course   Procedures  Labs Reviewed   TSH - Abnormal; Notable for the following components:       Result Value    TSH 4.399 (*)     All other components within normal limits   POCT GLUCOSE - Abnormal; Notable for the following components:    POCT Glucose 128 (*)     All other components within normal limits   CULTURE, BLOOD   HEMOGLOBIN A1C   LIPID PANEL   T4, FREE   URINALYSIS, REFLEX TO URINE CULTURE   POCT GLUCOSE MONITORING CONTINUOUS          Imaging Results          CTA STROKE MULTI-PHASE (Final result)  Result time 10/10/20 00:49:54    Final result by Surinder Lopez MD (10/10/20 00:49:54)                 Impression:      CT head:    Patchy areas of hypoattenuation in the left corona radiata/centrum semiovale consistent with patient's known prior infarct, with a distribution suggesting watershed infarction.  No hemorrhagic transformation.  No new acute major vascular distribution infarct.    CTA head and neck:    Persistent occlusion of the left internal carotid artery with collateral filling/reconstitution at the cavernous segment.    Relative diminutive appearance of the left MCA without high-grade stenosis or occlusion.  This represents interval change from 08/23/2020.    No intracranial large vessel arterial occlusion.    Findings were discussed with Laura Trevino, nurse-practitioner of the stroke team, at 23:50 p.m. on 10/09/2020 by telephone by Dr. Ocasio.    Electronically signed by resident: Yinka Ocasio  Date:    10/09/2020  Time:    23:47    Electronically signed by: Surinder Lopez MD  Date:    10/10/2020  Time:    00:49             Narrative:    EXAMINATION:  CTA STROKE MULTI-PHASE    CLINICAL HISTORY:  Neuro deficit, acute, stroke suspected;    TECHNIQUE:  Multiphase CTA head and  neck. Axial CT images obtained throughout the head without intravenous contrast. CT angiogram was then performed from the top of aortic arch to the vertex during bolus administration of 100 mL of Omnipaque 350 intravenous contrast.  Scan through the head and neck was performed in arterial phase, with 2 additional delayed phases of scans through the head alone. Sagittal and coronal reconstructions, including maximum intensity projection reconstructions were performed.    COMPARISON:  CT head 10/09/2020.  MRI brain 08/24/2020.  CTA head neck 08/23/2020.  CT head 08/22/2020    FINDINGS:  Intracranial compartment:    Ventricles and sulci are normal in size for age without evidence of hydrocephalus. No extra-axial blood or fluid collections.    Several small areas of hypoattenuation in the left corona radiata and centrum semiovale consistent patient's known evolving infarct in similar in distribution when compared to prior MRI brain.  No acute parenchymal hemorrhage or significant mass effect.  No midline shift.  No definite additional area of major vascular distribution infarct.    Intracranial Vessels:    Anterior circulation: Anterior cerebral arteries are patent without high-grade stenosis or occlusion.    Right MCA is patent without high-grade stenosis or occlusion.  Noting non opacification of the intracranial left ICA, there is a relatively asymmetric diminutive appearance of the left MCA as compared to the right, an interval change when compared to prior CTA, allowing for differences in technique.  No high-grade stenosis or occlusion of the left MCA.    Posterior circulation: No focal high grade stenosis, occlusion, aneurysm, or vascular malformation.    Venous structures:  Normal opacification.    Neck Vessels:    Aortic arch: Left-sided aortic arch without significant calcification.    Carotid circulation:    The right common carotid and ICA is well opacified without high-grade stenosis or occlusion.  There is  mild calcified noncalcified atherosclerotic plaque at the right carotid bulb with less than 50% stenosis of the right ICA by NASCET criteria.    There is abundant atherosclerotic plaque at the left carotid bulb and origin of the left ICA with complete narrowing of the left ICA at its origin and non opacification throughout the remaining cervical course.  The left ICA becomes opacified distally at its cavernous portion, likely from collateral vessels.  There is 100% stenosis of the left ICA by NASCET criteria.    Vertebral circulation: There are no hemodynamically significant stenoses, aneurysmal dilatations, or evidence of dissection.    Skull/extracranial contents (limited evaluation): No calvarial fracture.  Mild mucosal thickening in the ethmoid air cells.  Mastoid air cells are clear.  Orbits are within normal limits.    Neck/Cervical Spine/Thoracic Inlet (limited evaluation): Straightening of the normal cervical lordosis.  No acute fracture or osseous destructive process.  Craniocervical junction is intact.                                 Medical Decision Making:   History:   Old Medical Records: I decided to obtain old medical records.  Initial Assessment:   60-year-old female presenting to the ED from outside hospital for further  Differential Diagnosis:   Ischemic stroke, hemorrhagic stroke, complex migraine  ED Management:  Vascular neurology was at bedside.  Patient's NIH stroke scale improved from 9 at outside hospital is a 7 here after tPA.  CTA multi phase was ordered, and was suggestive for watershed infarction.  They assessed patient is, and patient is not a candidate for IR intervention.  Patient will be medically managed, and is being admitted to neuro ICU.  Other:   I have discussed this case with another health care provider.       <> Summary of the Discussion: Vascular Neurology was at bedside, and they consulted neuro ICU for admission.              Attending Attestation:   Physician Attestation  Statement for Resident:  As the supervising MD   Physician Attestation Statement: I have personally seen and examined this patient.   I agree with the above history. -:   As the supervising MD I agree with the above PE.    As the supervising MD I agree with the above treatment, course, plan, and disposition.   -:   Transfer from outside hospital, patient received tPA for stroke.  Not candidate for IR.  Admitted to neuro ICU.  I have reviewed the following: records from a referring facility and old CTs.                              Clinical Impression:     ICD-10-CM ICD-9-CM   1. CVA (cerebral vascular accident)  I63.9 434.91                          ED Disposition Condition    Admit                             Santy Mahmood MD  Resident  10/10/20 3773       Krystin Zaidi MD  10/11/20 3437

## 2020-10-10 NOTE — HPI
Rosalia Fagan is a 60-year-old female with a pmh of CVA, HTN, HLD, pancreatitis  who presents to [facility] s/p TPA for L MCA syndrome. She went to [facility] ED with acute onset of right-sided weakness and right-sided facial droop.  Symptoms started approximately 30 minutes prior to arrival.  Her son also reported that her speech was altered.  This was similar to a stroke she had back in August for which she had recovered completely from. CTA showed chronic L ICA occlusion but no LVO. She is being admitted to [facility] for a higher level of care and close neurologic monitoring.

## 2020-10-10 NOTE — PLAN OF CARE
Recommendations     1. Continue current Cardiac diet.   2. RD to monitor & follow-up.     Goals: Meet % EEN, EPN by RD f/u date  Nutrition Goal Status: new  Communication of RD Recs: reviewed with RN

## 2020-10-10 NOTE — PROGRESS NOTES
Patient arrived to [facility] >>  ED>> [facility]>> [facility]    Type of stroke/diagnosis: L MCA    TPA start: 2143,  end time: 2243    Current symptoms: RLE weakness, delayed speech    Skin assessment done: Y    Wounds noted: none    VESNA Armband Applied: yes, pending vesna    Patient Belongings on Admit: pink panties, black sandals,  Black eyeglasses, ernesto (rock), iphone    NCC notified: [place] NP

## 2020-10-10 NOTE — ED NOTES
Rosalia Fagan, a 60 y.o. female presents to the ED via EMS from [facility] for vascular/neurocritcal care r/t CVA. Per patient, LKN approximately 10/9 @ 2030. Symptoms included generalized weakness, right sided weakness and right sided facial droop. TPA given at outside facility - bolus 9mg @ 2141 and 81mg infusion started @ 2143. Infusion finished @ 2243 per EMS. Daily baby Asprin and Plavix. Previous stroke in August w/ RUE/RLE weakness. Walker baseline. AAOx4.     Triage note:  Chief Complaint   Patient presents with    Cerebrovascular Accident     +TPA -- poss IR -- CTAM on arrival     Review of patient's allergies indicates:  No Known Allergies  Past Medical History:   Diagnosis Date    Adrenal adenoma, left 05/2017    Chronic anxiety     Chronic back pain     Closed L3 vertebral fracture 12/2017    fell 2 weeks after back surgery     COPD (chronic obstructive pulmonary disease)     Degenerative joint disease (DJD) of lumbar spine     Diverticulosis of colon     DJD (degenerative joint disease)     Elevated liver enzymes     Encounter for blood transfusion     Fibroids, intramural 02/2016    GERD (gastroesophageal reflux disease)     History of shingles 02/2016    Right flank    Hyperlipemia     Hypertension     Ischemic colitis 10/2017    Lumbar post-laminectomy syndrome     Menopausal and female climacteric states     Muscle contraction headache syndrome     Pancreatitis 04/2017    PTSD (post-traumatic stress disorder)     daughter age 7 killed in the driveway hit by a drunk     Spinal stenosis of lumbar region at multiple levels     Varicose veins of both lower extremities with inflammation     Vitamin D deficiency

## 2020-10-10 NOTE — ASSESSMENT & PLAN NOTE
- VN following   - CTA shows L ICA occlusion no LVO no IR   - MRI tomorrow   -  -200  - Q 1 vital signs   - Q 1 neuro checks  - TSH, A1c, lipid, echo, and ekg  - Atorvastatin daily   - aspirin pending post tPA imaging   - PT/OT/SLP eval and treat

## 2020-10-10 NOTE — HPI
59 y/o female PMH HTN, HLD, recent CVA. Presents with acute onset of right sided weakness, facial droop. Patient had recent stroke in August with L ICA occlusion and back to baseline with no residual deficits. [place] was [place] and seen in telemedicine by Dr Merritt and with no acute process seen on CT scan and no contraindications recommendation was to give IV TPA and transfer to [facility] for further evaluation.     Upon arrival CTA head and neck multiphase done and no LVO seen so no IR. Patient is conversant has facial droop, slight weakness on right and sensory loss. MRI negative for acute stroke; not ADC correlate; likely hypoperfusion related events. Avoid hypoperfusion; target -160s on long term basis. Continue DAPT, statin. Therapy recommends to continue outpatient PT/OT.

## 2020-10-10 NOTE — PLAN OF CARE
[facility] Care Plan    POC reviewed with Rosalia Fagan and son at 1400. MRI is scheduled for 8pm for 24 hour post TPA. Cardiac diet started, tolerating well. Out of bed today for a few hours. Patient verbalized understanding. Questions and concerns addressed. No acute events today. Progressing toward goals. Will continue to monitor. See below and flowsheets for full assessment and VS info.       Neuro:  [place] Coma Scale  Best Eye Response: 4-->(E4) spontaneous  Best Motor Response: 6-->(M6) obeys commands  Best Verbal Response: 5-->(V5) oriented  [place] Coma Scale Score: 15  Assessment Qualifiers: patient not sedated/intubated        24 hr Temp:  [98 °F (36.7 °C)-98.7 °F (37.1 °C)]     CV:   Rhythm: normal sinus rhythm  BP goals:   SBP < 180  MAP > 65    Resp:   O2 Device (Oxygen Therapy): room air       Plan: N/A    GI/:  MICHELLE Total Score: 20  Diet/Nutrition Received: NPO  Last Bowel Movement: 10/09/20  Voiding Characteristics: unable to void    Intake/Output Summary (Last 24 hours) at 10/10/2020 1635  Last data filed at 10/10/2020 1400  Gross per 24 hour   Intake 792.5 ml   Output 2000 ml   Net -1207.5 ml     Unmeasured Output  Stool Occurrence: 0    Labs/Accuchecks:  Recent Labs   Lab 10/10/20  0158   WBC 18.47*   RBC 4.60   HGB 13.8   HCT 42.8         Recent Labs   Lab 10/10/20  0158      K 4.3   CO2 21*      BUN 21*   CREATININE 0.9   ALKPHOS 126   ALT 29   AST 27   BILITOT 0.4      Recent Labs   Lab 10/09/20  2115   INR 1.1   APTT 30.9    No results for input(s): CPK, CPKMB, TROPONINI, MB in the last 168 hours.    Electrolytes: N/A - electrolytes WDL  Accuchecks: Q6H    Gtts:       LDA/Wounds:  Lines/Drains/Airways       Peripheral Intravenous Line                   Peripheral IV - Single Lumen 10/10/20 0010 20 G Left Antecubital less than 1 day         Peripheral IV - Single Lumen 10/10/20 0010 20 G Right Hand less than 1 day                  Wounds: No  Wound care consulted: No

## 2020-10-10 NOTE — PT/OT/SLP EVAL
Speech Language Pathology Evaluation  Bedside Swallow    Patient Name:  Rosalia Fagan   MRN:  [id number]  Admitting Diagnosis: Acute ischemic left MCA stroke    Recommendations:                 General Recommendations:  Speech language evaluation, Cognitive-linguistic evaluation and monitor diet tolerance  Diet recommendations:  Regular, Thin   Aspiration Precautions: Standard aspiration precautions   General Precautions: Standard, fall  Communication strategies:  provide increased time to answer    History:     Past Medical History:   Diagnosis Date    Adrenal adenoma, left 05/2017    Chronic anxiety     Chronic back pain     Closed L3 vertebral fracture 12/2017    fell 2 weeks after back surgery     COPD (chronic obstructive pulmonary disease)     Degenerative joint disease (DJD) of lumbar spine     Diverticulosis of colon     Elevated liver enzymes     Encounter for blood transfusion     Fibroids, intramural 02/2016    GERD (gastroesophageal reflux disease)     History of shingles 02/2016    Right flank    Hyperlipemia     Hypertension     Ischemic colitis 10/2017    Lumbar post-laminectomy syndrome     Menopausal and female climacteric states     Muscle contraction headache syndrome     Pancreatitis 04/2017    PTSD (post-traumatic stress disorder)     daughter age 7 killed in the driveway hit by a drunk     Spinal stenosis of lumbar region at multiple levels     Varicose veins of both lower extremities with inflammation     Vitamin D deficiency        Past Surgical History:   Procedure Laterality Date    APPENDECTOMY  1992    BREAST BIOPSY Right 1992    [place]    [facility]  [facility]    COLONOSCOPY N/A 10/10/2017    Procedure: COLONOSCOPY;  Surgeon: Mike Narvaez MD;  Location: [facility];  Service: Endoscopy;  Laterality: N/A;    COLONOSCOPY  10/05/2018    CORONARY ANGIOPLASTY  03/31/2016    EPIDURAL STEROID INJECTION INTO LUMBAR SPINE  11/2014    [facility]  "[place]    ESOPHAGOGASTRODUODENOSCOPY N/A 10/5/2018    Procedure: EGD (ESOPHAGOGASTRODUODENOSCOPY);  Surgeon: Mike Narvaez MD;  Location: [facility];  Service: Endoscopy;  Laterality: N/A;    LUMBAR LAMINECTOMY WITH FUSION  11/2017    TOTAL ABDOMINAL HYSTERECTOMY W/ BILATERAL SALPINGOOPHORECTOMY  02/2016       Prior Intubation HX:  None this admit    Modified Barium Swallow: None this admit    Chest X-Rays: 10/9: "No evidence of cardiopulmonary disease"    Prior diet: Reg/thin    Subjective     Pt awake and alert upon ST entry. Pt agreeable to participate with ST.    Pain/Comfort:  · Pain Rating 1: 0/10  · Pain Rating Post-Intervention 1: 0/10    Objective:     Oral Musculature Evaluation  · Oral Musculature: WFL  · Dentition: present and adequate  · Secretion Management: adequate  · Mucosal Quality: adequate  · Oral Labial Strength and Mobility: WFL(limited labial retraction however functional)  · Lingual Strength and Mobility: WFL  · Volitional Cough: strong  · Volitional Swallow: adequate rise to palpation  · Voice Prior to PO Intake: clear    Bedside Swallow Eval:   Consistencies Assessed:  · Thin liquids - x1 ice chip, x3 cup sips water, x3 straw sips water  · Puree - x1 tsp pudding  · Solids - x2 bites naina cracker     Oral Phase:   · WFL    Pharyngeal Phase:   · no overt clinical signs/symptoms of aspiration  · no overt clinical signs/symptoms of pharyngeal dysphagia    Compensatory Strategies  · None    Treatment: Pt consumed all po trials without overt s/s of aspiration or airway compromise. SLP recommend regular diet with thin liquids following safe swallow precautions. RN notified of findings. Continue ST per POC.    Assessment:     Rosalia Fagan is a 60 y.o. female with swallow function appearing WFL. Pt remains in need of speech/language/cognitive evaluation to determine future therapeutic plan of care. ST will follow up to ensure diet tolerance and initiate speech/language/cognitive " evaluation.     Goals:   Multidisciplinary Problems     SLP Goals        Problem: SLP Goal    Goal Priority Disciplines Outcome   SLP Goal     SLP Ongoing, Progressing   Description: Speech Language Pathology Goals  Goals expected to be met by 10/17:  1. Pt will tolerate regular diet with thin liquids following safe swallow precautions.   2. Pt will participate in evaluation of speech/language/cognitive skills to determine future therapeutic plan of care.                      Plan:     · Patient to be seen:  4 x/week   · Plan of Care expires:  11/08/20  · Plan of Care reviewed with:  patient   · SLP Follow-Up:  Yes       Discharge recommendations:  (tba)     Time Tracking:     SLP Treatment Date:   10/10/20  Speech Start Time:  0852  Speech Stop Time:  0903     Speech Total Time (min):  11 min    Billable Minutes: Eval Swallow and Oral Function 11    Gris Smith CF-SLP  10/10/2020

## 2020-10-10 NOTE — PLAN OF CARE
Problem: Physical Therapy Goal  Goal: Physical Therapy Goal  Description: Goals to be met by: 10/23/20     Patient will increase functional independence with mobility by performin. Supine to sit with Contact Guard Assistance.  2. Sit to supine with Contact Guard Assistance.  3. Sit to stand transfer with Contact Guard Assistance.  4. Bed to chair transfer with Contact Guard Assistance using Rolling Walker PRN.  5. Gait  x 150 feet with Contact Guard Assistance using Rolling Walker PRN.   6. Ascend/descend 4 stair with bilateral Handrails Contact Guard Assistance.   7. Lower extremity exercise program x 20 reps per handout, with assistance as needed.    Outcome: Ongoing, Progressing     PT goals established.

## 2020-10-10 NOTE — ED NOTES
Stroke code paged overhead, ED MD at bedside on EMS hallway. CT ready for pt. Stroke NP notified of pt arrival.

## 2020-10-10 NOTE — CARE UPDATE
Pt admitted from ED to  9014.  Pt on RA: Room Air with O2 sats 95%.  Will continue to monitor.

## 2020-10-10 NOTE — CONSULTS
[facility]  Vascular Neurology  Comprehensive Stroke Center  Consult Note    Inpatient consult to Vascular (Stroke) Neurology  Consult performed by: Laura Muse NP  Consult ordered by: Darwin Camp NP    Inpatient consult to Vascular (Stroke) Neurology  Consult performed by: Laura Muse NP  Consult ordered by: Krystin Zaidi MD        Assessment/Plan:     Patient is a 60 y.o. year old female with:    * Acute ischemic left MCA stroke  59 y/o female with chronic L ICA occlusion and prior stroke with new     Antithrombotics: once out of window of TPA , DAPT    Statins : lipitor 40 mg daily    Aggressive risk factor modification: HTN, HLD, Diet, Exercise, Obesity     Rehab efforts: The patient has been evaluated by a stroke team provider and the therapy needs have been fully considered based off the presenting complaints and exam findings. The following therapy evaluations are needed: PT evaluate and treat, OT evaluate and treat, SLP evaluate and treat    Diagnostics ordered/pending: MRI head without contrast to assess brain parenchyma    VTE prophylaxis: Mechanical prophylaxis: Place SCDs  None: Reason for No Pharmacological VTE Prophylaxis: Holding x 24 hours s/p treatment with alteplase (tPA)    BP parameters: Infarct: Post tPA, SBP <180        Carotid occlusion, left  chronic occlusion    Hypertension  Stroke risk factor  SBP <180 but avoid hypotension    COPD (chronic obstructive pulmonary disease)  Stroke risk factor      Hyperlipemia  Stroke risk factor  LDL 72.0  Lipitor 40 mg daily    Cytotoxic brain edema  As seen on cerebral imaging due to stroke        STROKE DOCUMENTATION     Acute Stroke Times   Last Known Normal Date: 10/09/20  Last Known Normal Time: 2000  Symptom Onset Date: 10/09/20  Symptom Onset Time: 2040  Stroke Team Called Date: 10/09/20  Stroke Team Called Time: 2117  Stroke Team Arrival Date: 10/09/20  Stroke Team Arrival Time: 2330  CT Interpretation  Time: 2127  Decision to Treat Time for Alteplase: 2141(bolus given)    NIH Scale:  1a. Level of Consciousness: 0-->Alert, keenly responsive  1b. LOC Questions: 0-->Answers both questions correctly  1c. LOC Commands: 0-->Performs both tasks correctly  2. Best Gaze: 0-->Normal  3. Visual: 1-->Partial hemianopia  4. Facial Palsy: 2-->Partial paralysis (total or near-total paralysis of lower face)  5a. Motor Arm, Left: 0-->No drift, limb holds 90 (or 45) degrees for full 10 secs  5b. Motor Arm, Right: 1-->Drift, limb holds 90 (or 45) degrees, but drifts down before full 10 secs, does not hit bed or other support  6a. Motor Leg, Left: 0-->No drift, leg holds 30 degree position for full 5 secs  6b. Motor Leg, Right: 1-->Drift, leg falls by the end of the 5-sec period but does not hit bed  7. Limb Ataxia: 0-->Absent  8. Sensory: 1-->Mild-to-moderate sensory loss, patient feels pinprick is less sharp or is dull on the affected side, or there is a loss of superficial pain with pinprick, but patient is aware of being touched  9. Best Language: 0-->No aphasia, normal  10. Dysarthria: 0-->Normal  11. Extinction and Inattention (formerly Neglect): 0-->No abnormality  Total (NIH Stroke Scale): 6    Modified [place] Score: 0  [place] Coma Scale:15   ABCD2 Score:    [id number] Score:   HAS -BLED Score:   ICH Score:   Hunt & Boss Classification:       Thrombolysis Candidate? Yes, given prior to arrival at outside hospital    Delays to Thrombolysis?  No    Interventional Revascularization Candidate?   Is the patient eligible for mechanical endovascular reperfusion (LAURA)?  No; No large vessel occlusion      Hemorrhagic change of an Ischemic Stroke: Does this patient have an ischemic stroke with hemorrhagic changes? No     Subjective:     History of Present Illness:  61 y/o female with acute onset of right sided weakness, facial droop. Patient had recent stroke in August with L ICA occlusion and back to baseline with no residual  deficits. She was taken to [facility] and seen in telemedicine by Dr Merritt and with no acute process seen on CT scan and no contraindications recommendation was to give IV TPA and transfer to [facility] for further evaluation.   Upon arrival CTA head and neck multiphase done and no LVO seen so no IR.   Patient is conversant has facial droop, slight weakness on right and sensory loss.    Risk factors obesity, HTN, HLP, prior stroke        Past Medical History:   Diagnosis Date    Adrenal adenoma, left 05/2017    Chronic anxiety     Chronic back pain     Closed L3 vertebral fracture 12/2017    fell 2 weeks after back surgery     COPD (chronic obstructive pulmonary disease)     Degenerative joint disease (DJD) of lumbar spine     Diverticulosis of colon     Elevated liver enzymes     Encounter for blood transfusion     Fibroids, intramural 02/2016    GERD (gastroesophageal reflux disease)     History of shingles 02/2016    Right flank    Hyperlipemia     Hypertension     Ischemic colitis 10/2017    Lumbar post-laminectomy syndrome     Menopausal and female climacteric states     Muscle contraction headache syndrome     Pancreatitis 04/2017    PTSD (post-traumatic stress disorder)     daughter age 7 killed in the driveway hit by a drunk     Spinal stenosis of lumbar region at multiple levels     Varicose veins of both lower extremities with inflammation     Vitamin D deficiency      Past Surgical History:   Procedure Laterality Date    APPENDECTOMY  1992    BRE[place] BIOPSY Right 1992    [place]    [facility]    [facility]    COLONOSCOPY N/A 10/10/2017    Procedure: COLONOSCOPY;  Surgeon: Mike Narvaez MD;  Location: [facility];  Service: Endoscopy;  Laterality: N/A;    COLONOSCOPY  10/05/2018    CORONARY ANGIOPLASTY  03/31/2016    EPIDURAL STEROID INJECTION INTO LUMBAR SPINE  11/2014    [facility] [place]    ESOPHAGOGASTRODUODENOSCOPY N/A 10/5/2018     "Procedure: EGD (ESOPHAGOGASTRODUODENOSCOPY);  Surgeon: Mike Narvaez MD;  Location: [facility];  Service: Endoscopy;  Laterality: N/A;    LUMBAR LAMINECTOMY WITH FUSION  2017    TOTAL ABDOMINAL HYSTERECTOMY W/ BILATERAL SALPINGOOPHORECTOMY  2016     Family History   Problem Relation Age of Onset    Arthritis Mother         severe lumbar    Hyperlipidemia Mother     Hypertension Mother     Anxiety disorder Mother     Breast cancer Sister     Kidney cancer Brother     Cancer Maternal Aunt     Bladder Cancer Father      Social History     Tobacco Use    Smoking status: Former Smoker     Packs/day: 0.50     Years: 45.00     Pack years: 22.50     Types: Cigarettes, Cigars     Start date:      Quit date: 2020     Years since quittin.1    Smokeless tobacco: Never Used    Tobacco comment: Pt enrolled in [employer] . Ambulatory referral to Smoking Cessation program.   Substance Use Topics    Alcohol use: No     Frequency: Never     Comment: "OCASSIONALLY"    Drug use: No     Review of patient's allergies indicates:  No Known Allergies    Medications: I have reviewed the current medication administration record.    (Not in a hospital admission)      Review of Systems   Constitutional: Negative for chills and fever.   HENT: Negative for ear pain and facial swelling.    Eyes: Positive for visual disturbance. Negative for pain and redness.   Respiratory: Negative for cough and shortness of breath.    Cardiovascular: Negative for chest pain and leg swelling.   Genitourinary: Negative for dyspareunia and dysuria.   Musculoskeletal: Positive for arthralgias and back pain.   Skin: Negative for rash and wound.   Neurological: Positive for facial asymmetry, speech difficulty, weakness and numbness.     Objective:     Vital Signs (Most Recent):  Temp: 98.2 °F (36.8 °C) (10/09/20 2331)  Pulse: 98 (10/10/20 0011)  Resp: 18 (10/10/20 0011)  BP: 139/60 (10/10/20 0011)  SpO2: 96 % (10/10/20 " 0011)    Vital Signs Range (Last 24H):  Temp:  [98.2 °F (36.8 °C)-98.3 °F (36.8 °C)]   Pulse:  []   Resp:  [18-20]   BP: (127-157)/()   SpO2:  [96 %-99 %]     Physical Exam  Vitals signs and nursing note reviewed.   Constitutional:       Appearance: Normal appearance. She is obese.   HENT:      Head: Normocephalic and atraumatic.   Eyes:      Extraocular Movements: Extraocular movements intact.      Pupils: Pupils are equal, round, and reactive to light.   Neck:      Musculoskeletal: Normal range of motion.   Cardiovascular:      Rate and Rhythm: Normal rate and regular rhythm.   Pulmonary:      Effort: Pulmonary effort is normal.      Breath sounds: Normal breath sounds.   Abdominal:      General: Abdomen is flat.      Palpations: Abdomen is soft.   Skin:     General: Skin is warm and dry.   Neurological:      Mental Status: She is alert and oriented to person, place, and time.      Comments: Right sided weakness, facial droop, sensory loss, hemianopsia         Neurological Exam:   LOC: alert  Attention Span: Good   Language: No aphasia  Articulation: No dysarthria  Orientation: Person, Place, Time   Visual Fields: Hemianopsia right  EOM (CN III, IV, VI): Full/intact  Pupils (CN II, III): PERRL  Facial Sensation (CN V): Normal  Facial Movement (CN VII): Lower facial weakness on the Right  Gag Reflex: present  Reflexes: flexor plantar responses bilaterally  Motor: Arm left  Normal 5/5  Leg left  Normal 5/5  Arm right  Paresis: 4/5  Leg right Paresis: 4/5  Cebellar: No evidence of appendicular or axial ataxia  Sensation: Huey-hypoesthesia right  Tone: Normal tone throughout      Laboratory:  CMP:   Recent Labs   Lab 10/09/20  2115   CALCIUM 9.4   ALBUMIN 4.5   PROT 7.8      K 4.2   CO2 25      BUN 19*   CREATININE 0.90   ALKPHOS 132   ALT 30   AST 39*   BILITOT 0.5     CBC:   Recent Labs   Lab 10/09/20  2115   WBC 11.60   RBC 4.32   HGB 13.1   HCT 39.0      MCV 90   MCH 30.3   MCHC 33.6      Lipid Panel:   Recent Labs   Lab 10/10/20  0004   CHOL 138   LDLCALC 72.0   HDL 51   TRIG 75     Coagulation:   Recent Labs   Lab 10/09/20  2115   INR 1.1   APTT 30.9     Hgb A1C:   Recent Labs   Lab 10/10/20  0004   HGBA1C 5.4     TSH:   Recent Labs   Lab 10/10/20  0004   TSH 4.399*       Diagnostic Results:      Brain imaging:  CT Head w/o contrast 10-9-10 results:      Vessel Imaging:  CTA Head and neck multiphase 10-9-20 results:  CT head:     Patchy areas of hypoattenuation in the left corona radiata/centrum semiovale consistent with patient's known prior infarct, with a distribution suggesting watershed infarction.  No hemorrhagic transformation.  No new acute major vascular distribution infarct.     CTA head and neck:     Persistent occlusion of the left internal carotid artery with collateral filling/reconstitution at the cavernous segment.     Relative diminutive appearance of the left MCA without high-grade stenosis or occlusion.  This represents interval change from 08/23/2020.     No intracranial large vessel arterial occlusion.    Cardiac Evaluation:   2 D Echo 8-24-20 results:  · Low normal left ventricular systolic function. The estimated ejection fraction is 50%.  · Local segmental wall motion abnormalities.  · Concentric left ventricular remodeling.  · Indeterminate left ventricular diastolic function.  · Hyperdynamic right ventricular systolic function.  · Normal central venous pressure (3 mmHg).  · Patent foramen ovale present.  · Study is positive for an intracardiac shunt.  · Positive Matias's sign. If clinically indicated recommend evaluation for acute pulmonary embolism.      Laura Muse NP  [facility]  Department of Vascular Neurology   [facility]

## 2020-10-10 NOTE — PT/OT/SLP EVAL
"Physical Therapy Evaluation    Patient Name:  Rosalia Fagan   MRN:  [id number]    Recommendations:     Discharge Recommendations:  rehabilitation facility   Discharge Equipment Recommendations: (TBA)   Barriers to discharge: Inaccessible home and Decreased caregiver support    Assessment:     Rosalia Fagan is a 60 y.o. female admitted with a medical diagnosis of Acute ischemic left MCA stroke.     Upon evaluation, pt was within the 24 hour window of receiving tPA, therefore PT did not facilitate pt with transfer nor gait training.  Of note, pt was found sitting in chair, assisted by nurse. She presents with the following impairments/functional limitations:  weakness, impaired endurance, impaired functional mobilty, gait instability, impaired balance.    Rehab Prognosis: Good; patient would benefit from acute skilled PT services to address these deficits and reach maximum level of function.    Recent Surgery: * No surgery found *      Plan:     During this hospitalization, patient to be seen 5 x/week to address the identified rehab impairments via gait training, therapeutic activities, therapeutic exercises, neuromuscular re-education and progress toward the following goals:    · Plan of Care Expires:  11/08/20    Subjective     Chief Complaint: No complaints  Patient/Family Comments/goals: "I feel better."  Pain/Comfort:  · Pain Rating 1: 0/10    Patients cultural, spiritual, Taoist conflicts given the current situation: no    Living Environment:  Pt currently living with son, SSH, 0 JARED.  Prior to admission, patients level of function was I with all functional mobility and ADLs, though for the past 2 months pt requires use of rollator or RW to amb.  Equipment used at home: rollator, RW.  DME owned (not currently used): bedside commode, shower chair and transfer tub bench.  Upon discharge, patient will have assistance from children.    Objective:     Communicated with nursing prior to session.  Patient " found up in chair with blood pressure cuff, peripheral IV, pulse ox (continuous), telemetry  upon PT entry to room.    General Precautions: Standard, fall   Orthopedic Precautions:Full weight bearing   Braces: N/A     Exams:  · Cognitive Exam:  Patient is oriented to Person, Place, Time and Situation  · Fine Motor Coordination:    · -       Intact  Left hand thumb/finger opposition skills and Right hand thumb/finger opposition skills  · Gross Motor Coordination:  WFL  · Postural Exam:  Patient presented with the following abnormalities:    · -       No postural abnormalities identified  · Sensation:    · -       Intact  · Skin Integrity/Edema:      · -       Edema: None noted B LEs  · RUE ROM: WFL  · RUE Strength: WFL  · LUE ROM: WFL  · LUE Strength: WFL  · RLE ROM: WFL  · RLE Strength: WFL  · LLE ROM: WFL  · LLE Strength: WFL    Functional Mobility:  Pt not appropriate to participate in functional mobility at this time    Therapeutic Activities and Exercises:   Whiteboard updated    AM-PAC 6 CLICK MOBILITY  Total Score:16     Patient left up in chair with all lines intact and call button in reach.    GOALS:   Multidisciplinary Problems     Physical Therapy Goals        Problem: Physical Therapy Goal    Goal Priority Disciplines Outcome Goal Variances Interventions   Physical Therapy Goal     PT, PT/OT Ongoing, Progressing     Description: Goals to be met by: 10/23/20     Patient will increase functional independence with mobility by performin. Supine to sit with Contact Guard Assistance.  2. Sit to supine with Contact Guard Assistance.  3. Sit to stand transfer with Contact Guard Assistance.  4. Bed to chair transfer with Contact Guard Assistance using Rolling Walker PRN.  5. Gait  x 150 feet with Contact Guard Assistance using Rolling Walker PRN.   6. Ascend/descend 4 stair with bilateral Handrails Contact Guard Assistance.   7. Lower extremity exercise program x 20 reps per handout, with assistance as  needed.                     History:     Past Medical History:   Diagnosis Date    Adrenal adenoma, left 05/2017    Chronic anxiety     Chronic back pain     Closed L3 vertebral fracture 12/2017    fell 2 weeks after back surgery     COPD (chronic obstructive pulmonary disease)     Degenerative joint disease (DJD) of lumbar spine     Diverticulosis of colon     Elevated liver enzymes     Encounter for blood transfusion     Fibroids, intramural 02/2016    GERD (gastroesophageal reflux disease)     History of shingles 02/2016    Right flank    Hyperlipemia     Hypertension     Ischemic colitis 10/2017    Lumbar post-laminectomy syndrome     Menopausal and female climacteric states     Muscle contraction headache syndrome     Pancreatitis 04/2017    PTSD (post-traumatic stress disorder)     daughter age 7 killed in the driveway hit by a drunk     Spinal stenosis of lumbar region at multiple levels     Varicose veins of both lower extremities with inflammation     Vitamin D deficiency        Past Surgical History:   Procedure Laterality Date    APPENDECTOMY  1992    BRE[place]OPSY Right 1992    [place]    [place]    [facility] [place] COLONOSCOPY N/A 10/10/2017    Procedure: COLONOSCOPY;  Surgeon: Mike Narvaez MD;  [facility];  Service: Endoscopy;  Laterality: N/A;    COLONOSCOPY  10/05/2018    CORONARY ANGIOPLASTY  03/31/2016    EPIDURAL STEROID INJECTION INTO LUMBAR SPINE  11/2014    [facility]    ESOPHAGOGASTRODUODENOSCOPY N/A 10/5/2018    Procedure: EGD (ESOPHAGOGASTRODUODENOSCOPY);  Surgeon: Mike Narvaez MD;  Location[facility];  Service: Endoscopy;  Laterality: N/A;    LUMBAR LAMINECTOMY WITH FUSION  11/2017    TOTAL ABDOMINAL HYSTERECTOMY W/ BILATERAL SALPINGOOPHORECTOMY  02/2016       Time Tracking:     PT Received On: 10/10/20  PT Start Time: 1050     PT Stop Time: 1118  PT Total Time (min): 28 min     Billable Minutes: Evaluation  8      Justyna Rouse, PT  10/10/2020

## 2020-10-10 NOTE — H&P
[facility]  Neurocritical Care  History & Physical    Admit Date: 10/9/2020  Service Date: 10/10/2020  Length of Stay: 0    Subjective:     Chief Complaint: Acute ischemic left MCA stroke    History of Present Illness: Rosalia Fagan is a 60-year-old female with a pmh of CVA, HTN, HLD, pancreatitis  who presents to [facility] s/p TPA for L MCA syndrome. She went to [facility] ED with acute onset of right-sided weakness and right-sided facial droop.  Symptoms started approximately 30 minutes prior to arrival.  Her son also reported that her speech was altered.  This was similar to a stroke she had back in August for which she had recovered completely from. CTA showed chronic L ICA occlusion but no LVO. She is being admitted to [facility] for a higher level of care and close neurologic monitoring.     Past Medical History:   Diagnosis Date    Adrenal adenoma, left 05/2017    Chronic anxiety     Chronic back pain     Closed L3 vertebral fracture 12/2017    fell 2 weeks after back surgery     COPD (chronic obstructive pulmonary disease)     Degenerative joint disease (DJD) of lumbar spine     Diverticulosis of colon     DJD (degenerative joint disease)     Elevated liver enzymes     Encounter for blood transfusion     Fibroids, intramural 02/2016    GERD (gastroesophageal reflux disease)     History of shingles 02/2016    Right flank    Hyperlipemia     Hypertension     Ischemic colitis 10/2017    Lumbar post-laminectomy syndrome     Menopausal and female climacteric states     Muscle contraction headache syndrome     Pancreatitis 04/2017    PTSD (post-traumatic stress disorder)     daughter age 7 killed in the driveway hit by a drunk     Spinal stenosis of lumbar region at multiple levels     Varicose veins of both lower extremities with inflammation     Vitamin D deficiency      Past Surgical History:   Procedure Laterality Date    APPENDECTOMY  1992    BREAST BIOPSY Right 1992    [place]    [place]    [facility] [place]    COLONOSCOPY N/A 10/10/2017    Procedure: COLONOSCOPY;  Surgeon: Mike Narvaez MD;  Location: [facility];  Service: Endoscopy;  Laterality: N/A;    COLONOSCOPY  10/05/2018    CORONARY ANGIOPLASTY  03/31/2016    EPIDURAL STEROID INJECTION INTO LUMBAR SPINE  11/2014    [facility]    ESOPHAGOGASTRODUODENOSCOPY N/A 10/5/2018    Procedure: EGD (ESOPHAGOGASTRODUODENOSCOPY);  Surgeon: Mike Narvaez MD;  Location: [facility];  Service: Endoscopy;  Laterality: N/A;    LUMBAR LAMINECTOMY WITH FUSION  11/2017    TOTAL ABDOMINAL HYSTERECTOMY W/ BILATERAL SALPINGOOPHORECTOMY  02/2016      Current Facility-Administered Medications on File Prior to Encounter   Medication Dose Route Frequency Provider Last Rate Last Dose    [COMPLETED] alteplase (ACTIVASE) injection 81 mg  81 mg Intravenous Once Leroy Hess MD 81 mL/hr at 10/09/20 2143 81 mg at 10/09/20 2143    [COMPLETED] alteplase (ACTIVASE) injection 9 mg  0.09 mg/kg Intravenous Once Leroy Hess MD   Stopped at 10/09/20 2142    [DISCONTINUED] 0.9%  NaCl infusion  50 mL Intravenous Once Leroy Hess MD         Current Outpatient Medications on File Prior to Encounter   Medication Sig Dispense Refill    ALPRAZolam (XANAX) 0.5 MG tablet Take 1 tablet (0.5 mg total) by mouth 3 (three) times daily as needed for Anxiety. 90 tablet 5    amLODIPine (NORVASC) 10 MG tablet Take 1 tablet (10 mg total) by mouth once daily. 30 tablet 11    aspirin (ECOTRIN) 81 MG EC tablet Take 1 tablet (81 mg total) by mouth once daily. 30 tablet 0    atorvastatin (LIPITOR) 40 MG tablet Take 1 tablet (40 mg total) by mouth once daily. 30 tablet 11    benazepriL (LOTENSIN) 20 MG tablet Take 1 tablet (20 mg total) by mouth once daily. 30 tablet 11    clopidogreL (PLAVIX) 75 mg tablet Take 1 tablet (75 mg total) by mouth once daily. 30 tablet 11    cyanocobalamin (VITAMIN B-12) 1000 MCG tablet Take 1,000 mcg by mouth once daily.    "   cyclobenzaprine (FLEXERIL) 10 MG tablet TAKE 1 TABLET BY MOUTH THREE TIMES A DAY AS NEEDED FOR MUSCLE SPASMS 90 tablet 5    escitalopram oxalate (LEXAPRO) 5 MG Tab Take 1 tablet (5 mg total) by mouth once daily. 30 tablet 11    multivitamin capsule Take 1 capsule by mouth once daily.      spironolactone (ALDACTONE) 25 MG tablet Take 1 tablet (25 mg total) by mouth once daily. 30 tablet 11    triamcinolone acetonide 0.5% (KENALOG) 0.5 % Crea Apply 1 application topically 2 (two) times daily. 15 g 11      Allergies: Patient has no known allergies.    Family History   Problem Relation Age of Onset    Arthritis Mother         severe lumbar    Hyperlipidemia Mother     Hypertension Mother     Anxiety disorder Mother     Breast cancer Sister     Kidney cancer Brother     Cancer Maternal Aunt     Bladder Cancer Father      Social History     Tobacco Use    Smoking status: Former Smoker     Packs/day: 0.50     Years: 45.00     Pack years: 22.50     Types: Cigarettes, Cigars     Start date:      Quit date: 2020     Years since quittin.1    Smokeless tobacco: Never Used    Tobacco comment: Pt enrolled in Tobacco [facility] . Ambulatory referral to Smoking Cessation program.   Substance Use Topics    Alcohol use: No     Frequency: Never     Comment: "OCASSIONALLY"    Drug use: No     Review of Systems     Review of Symptoms:  Constitutional: Denies fevers, weight loss, chills, or weakness.  Eyes: Denies changes in vision.  ENT: Denies dysphagia, nasal discharge, ear pain or discharge.  Cardiovascular: Denies chest pain, palpitations, orthopnea, or claudication.  Respiratory: Denies shortness of breath, cough, hemoptysis, or wheezing.  GI: Denies nausea/vomitting, hematochezia, melena, abd pain, or changes in appetite.  : Denies dysuria, incontinence, or hematuria.  Musculoskeletal: Denies joint pain or myalgias. R weakness   Skin/breast: Denies rashes, lumps, lesions, or discharge.  Neurologic: " Denies headache, dizziness, vertigo, or paresthesias. Speech difficulty   Psychiatric: Denies changes in mood or hallucinations.  Endocrine: Denies polyuria, polydipsia, heat/cold intolerance.  Hematologic/Lymph: Denies lymphadenopathy, easy bruising or easy bleeding.  Allergic/Immunologic: Denies rash, rhinitis.     Objective:     Vitals:    Temp: 98.2 °F (36.8 °C)  Pulse: 98  BP: 139/60  MAP (mmHg): 109  Resp: 18  SpO2: 96 %  O2 Device (Oxygen Therapy): room air    Temp  Min: 98.2 °F (36.8 °C)  Max: 98.3 °F (36.8 °C)  Pulse  Min: 85  Max: 102  BP  Min: 127/73  Max: 157/82  MAP (mmHg)  Min: 93  Max: 111  Resp  Min: 18  Max: 20  SpO2  Min: 96 %  Max: 99 %    No intake/output data recorded.           Physical Exam      Physical Exam:  GA: Alert, comfortable, no acute distress.   HEENT: No scleral icterus or JVD.   Pulmonary: Clear to auscultation A/L.   Cardiac: RRR S1 & S2 w/o rubs/murmurs/gallops.   Abdominal: Bowel sounds present x 4.   Skin: No jaundice, rashes, or visible lesions.  Psych: Mental Status:  Alert oriented follows   Neuro:  --GCS: E4 V5 M6  --R facial droop, slurred speech   --Pupils 3mm, PERRL.   --Corneal reflex, gag, cough intact.  --LUE strength: 5/5  --RUE strength: 3/5  --LLE strength: 5/5  --RLE strength: 3/5    Unable to test gait due to level of consciousness.    Today I personally reviewed pertinent medications, lines/drains/airways, imaging, cardiology results, laboratory results, notably: CTA    Assessment/Plan:     Neuro  * Acute ischemic left MCA stroke  - VN following   - CTA shows L ICA occlusion no LVO no IR   - MRI tomorrow   -  -200  - Q 1 vital signs   - Q 1 neuro checks  - TSH, A1c, lipid, echo, and ekg  - Atorvastatin daily   - aspirin pending post tPA imaging   - PT/OT/SLP eval and treat     Cytotoxic brain edema  - see stroke    Pulmonary  COPD (chronic obstructive pulmonary disease)  - duo nebs prn   - SPO2 > 92%    Cardiac/Vascular  Carotid occlusion, left  - chronic  seen on CTA    Hyperlipemia  - lipid panel   - atorvastatin daily     Hypertension  - Echo and EKG   - -200      Other  Impaired instrumental activities of daily living (IADL)  - Pt/Ot eval and treat          The patient is being Prophylaxed for:  Venous Thromboembolism with: Mechanical  Stress Ulcer with: Not Applicable   Ventilator Pneumonia with: not applicable    Activity Orders          Diet NPO: NPO starting at 10/10 0037        Full Code    Darwin Camp NP  Neurocritical Care  [facility]

## 2020-10-10 NOTE — CONSULTS
"  [facility]  Adult Nutrition  Consult Note    SUMMARY     Recommendations    1. Continue current Cardiac diet.   2. RD to monitor & follow-up.    Goals: Meet % EEN, EPN by RD f/u date  Nutrition Goal Status: new  Communication of RD Recs: reviewed with RN    Reason for Assessment    Reason For Assessment: consult  Diagnosis: other (see comments)(Stroke)  Relevant Medical History: HTN, HLD  Interdisciplinary Rounds: did not attend    General Information Comments: Pt passed MICHELLE, diet advanced to Cardiac this AM. PTA, pt reports good appetite & stable wt (UBW: 220#). Chart review confirms weight history. Pt w/ no indicators of malnutrition, NFPE not warranted.  Nutrition Discharge Planning: Adequate PO intake    Nutrition/Diet History    Patient Reported Diet/Restrictions/Preferences: general  Factors Affecting Nutritional Intake: other (see comments)(Diet just advanced.)    Anthropometrics    Temp: 98.7 °F (37.1 °C)  Height Method: Stated  Height: 5' 7" (170.2 cm)  Height (inches): 67 in  Weight Method: Stated  Weight: 102.1 kg (225 lb 1.4 oz)  Weight (lb): 225.09 lb  Ideal Body Weight (IBW), Female: 135 lb  % Ideal Body Weight, Female (lb): 166.73 %  BMI (Calculated): 35.2  BMI Grade: 35 - 39.9 - obesity - grade II    Lab/Procedures/Meds    Pertinent Labs Reviewed: reviewed  Pertinent Medications Reviewed: reviewed  Pertinent Medications Comments: IVF, Statin    Estimated/Assessed Needs    Weight Used For Calorie Calculations: 102.1 kg (225 lb 1.4 oz)     Energy Calorie Requirements (kcal): 1624 kcal/d  Energy Need Method: [place]-St Jeor(1.0 PAL)     Protein Requirements:  g/d (.8-1 g/kg)  Weight Used For Protein Calculations: 102.1 kg (225 lb 1.4 oz)     Estimated Fluid Requirement Method: other (see comments)(Per MD or 1 mL/kcal)  RDA Method (mL): 1624    Nutrition Prescription Ordered    Current Diet Order: Cardiac    Evaluation of Received Nutrient/Fluid Intake    Comments: LBM: " 10/9    Nutrition Risk    Level of Risk/Frequency of Follow-up: (1x/week)     Assessment and Plan    No nutritional dx at this time.     Monitor and Evaluation    Food and Nutrient Intake: energy intake, food and beverage intake  Food and Nutrient Adminstration: diet order  Physical Activity and Function: nutrition-related ADLs and IADLs  Anthropometric Measurements: weight, weight change  Biochemical Data, Medical Tests and Procedures: inflammatory profile, lipid profile, glucose/endocrine profile, gastrointestinal profile, electrolyte and renal panel  Nutrition-Focused Physical Findings: overall appearance     Nutrition Follow-Up    RD Follow-up?: Yes

## 2020-10-10 NOTE — SUBJECTIVE & OBJECTIVE
Past Medical History:   Diagnosis Date    Adrenal adenoma, left 05/2017    Chronic anxiety     Chronic back pain     Closed L3 vertebral fracture 12/2017    fell 2 weeks after back surgery     COPD (chronic obstructive pulmonary disease)     Degenerative joint disease (DJD) of lumbar spine     Diverticulosis of colon     Elevated liver enzymes     Encounter for blood transfusion     Fibroids, intramural 02/2016    GERD (gastroesophageal reflux disease)     History of shingles 02/2016    Right flank    Hyperlipemia     Hypertension     Ischemic colitis 10/2017    Lumbar post-laminectomy syndrome     Menopausal and female climacteric states     Muscle contraction headache syndrome     Pancreatitis 04/2017    PTSD (post-traumatic stress disorder)     daughter age 7 killed in the driveway hit by a drunk     Spinal stenosis of lumbar region at multiple levels     Varicose 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    CHOLECYST[facility]OM[facility]  [facility]    [facility]    COLONOSCOPY N/A 10/10/2017    Procedure: COLONOSCOPY;  Surgeon: Mike Narvaez MD;  Location: [facility];  Service: Endoscopy;  Laterality: N/A;    COLONOSCOPY  10/05/2018    CORONARY ANGIOPLASTY  03/31/2016    EPIDURAL STEROID INJECTION INTO LUMBAR SPINE  11/2014    [facility]    ESOPHAGOGASTRODUODENOSCOPY N/A 10/5/2018    Procedure: EGD (ESOPHAGOGASTRODUODENOSCOPY);  Surgeon: Mike Narvaez MD;  Location: [facility];  Service: Endoscopy;  Laterality: N/A;    LUMBAR LAMINECTOMY WITH FUSION  11/2017    TOTAL ABDOMINAL HYSTERECTOMY W/ BILATERAL SALPINGOOPHORECTOMY  02/2016     Family History   Problem Relation Age of Onset    Arthritis Mother         severe lumbar    Hyperlipidemia Mother     Hypertension Mother     Anxiety disorder Mother     Breast cancer Sister      "Kidney cancer Brother     Cancer Maternal Aunt     Bladder Cancer Father      Social History     Tobacco Use    Smoking status: Former Smoker     Packs/day: 0.50     Years: 45.00     Pack years: 22.50     Types: Cigarettes, Cigars     Start date:      Quit date: 2020     Years since quittin.1    Smokeless tobacco: Never Used    Tobacco comment: Pt enrolled in [employer] . Ambulatory referral to Smoking Cessation program.   Substance Use Topics    Alcohol use: No     Frequency: Never     Comment: "OCASSIONALLY"    Drug use: No     Review of patient's allergies indicates:  No Known Allergies    Medications: I have reviewed the current medication administration record.    (Not in a hospital admission)      Review of Systems   Constitutional: Negative for chills and fever.   HENT: Negative for ear pain and facial swelling.    Eyes: Positive for visual disturbance. Negative for pain and redness.   Respiratory: Negative for cough and shortness of breath.    Cardiovascular: Negative for chest pain and leg swelling.   Genitourinary: Negative for dyspareunia and dysuria.   Musculoskeletal: Positive for arthralgias and back pain.   Skin: Negative for rash and wound.   Neurological: Positive for facial asymmetry, speech difficulty, weakness and numbness.     Objective:     Vital Signs (Most Recent):  Temp: 98.2 °F (36.8 °C) (10/09/20 2331)  Pulse: 98 (10/10/20 0011)  Resp: 18 (10/10/20 0011)  BP: 139/60 (10/10/20 001)  SpO2: 96 % (10/10/20 001)    Vital Signs Range (Last 24H):  Temp:  [98.2 °F (36.8 °C)-98.3 °F (36.8 °C)]   Pulse:  []   Resp:  [18-20]   BP: (127-157)/()   SpO2:  [96 %-99 %]     Physical Exam  Vitals signs and nursing note reviewed.   Constitutional:       Appearance: Normal appearance. She is obese.   HENT:      Head: Normocephalic and atraumatic.   Eyes:      Extraocular Movements: Extraocular movements intact.      Pupils: Pupils are equal, round, and reactive to light. "   Neck:      Musculoskeletal: Normal range of motion.   Cardiovascular:      Rate and Rhythm: Normal rate and regular rhythm.   Pulmonary:      Effort: Pulmonary effort is normal.      Breath sounds: Normal breath sounds.   Abdominal:      General: Abdomen is flat.      Palpations: Abdomen is soft.   Skin:     General: Skin is warm and dry.   Neurological:      Mental Status: She is alert and oriented to person, place, and time.      Comments: Right sided weakness, facial droop, sensory loss, hemianopsia         Neurological Exam:   LOC: alert  Attention Span: Good   Language: No aphasia  Articulation: No dysarthria  Orientation: Person, Place, Time   Visual Fields: Hemianopsia right  EOM (CN III, IV, VI): Full/intact  Pupils (CN II, III): PERRL  Facial Sensation (CN V): Normal  Facial Movement (CN VII): Lower facial weakness on the Right  Gag Reflex: present  Reflexes: flexor plantar responses bilaterally  Motor: Arm left  Normal 5/5  Leg left  Normal 5/5  Arm right  Paresis: 4/5  Leg right Paresis: 4/5  Cebellar: No evidence of appendicular or axial ataxia  Sensation: Huey-hypoesthesia right  Tone: Normal tone throughout      Laboratory:  CMP:   Recent Labs   Lab 10/09/20  2115   CALCIUM 9.4   ALBUMIN 4.5   PROT 7.8      K 4.2   CO2 25      BUN 19*   CREATININE 0.90   ALKPHOS 132   ALT 30   AST 39*   BILITOT 0.5     CBC:   Recent Labs   Lab 10/09/20  2115   WBC 11.60   RBC 4.32   HGB 13.1   HCT 39.0      MCV 90   MCH 30.3   MCHC 33.6     Lipid Panel:   Recent Labs   Lab 10/10/20  0004   CHOL 138   LDLCALC 72.0   HDL 51   TRIG 75     Coagulation:   Recent Labs   Lab 10/09/20  2115   INR 1.1   APTT 30.9     Hgb A1C:   Recent Labs   Lab 10/10/20  0004   HGBA1C 5.4     TSH:   Recent Labs   Lab 10/10/20  0004   TSH 4.399*       Diagnostic Results:      Brain imaging:  CT Head w/o contrast 10-9-10 results:      Vessel Imaging:  CTA Head and neck multiphase 10-9-20 results:  CT head:     Patchy areas  of hypoattenuation in the left corona radiata/centrum semiovale consistent with patient's known prior infarct, with a distribution suggesting watershed infarction.  No hemorrhagic transformation.  No new acute major vascular distribution infarct.     CTA head and neck:     Persistent occlusion of the left internal carotid artery with collateral filling/reconstitution at the cavernous segment.     Relative diminutive appearance of the left MCA without high-grade stenosis or occlusion.  This represents interval change from 08/23/2020.     No intracranial large vessel arterial occlusion.    Cardiac Evaluation:   2 D Echo 8-24-20 results:  · Low normal left ventricular systolic function. The estimated ejection fraction is 50%.  · Local segmental wall motion abnormalities.  · Concentric left ventricular remodeling.  · Indeterminate left ventricular diastolic function.  · Hyperdynamic right ventricular systolic function.  · Normal central venous pressure (3 mmHg).  · Patent foramen ovale present.  · Study is positive for an intracardiac shunt.  · Positive Matias's sign. If clinically indicated recommend evaluation for acute pulmonary embolism.

## 2020-10-10 NOTE — ASSESSMENT & PLAN NOTE
61 y/o female with chronic L ICA occlusion and prior stroke with new     Antithrombotics: once out of window of TPA , DAPT    Statins : lipitor 40 mg daily    Aggressive risk factor modification: HTN, HLD, Diet, Exercise, Obesity     Rehab efforts: The patient has been evaluated by a stroke team provider and the therapy needs have been fully considered based off the presenting complaints and exam findings. The following therapy evaluations are needed: PT evaluate and treat, OT evaluate and treat, SLP evaluate and treat    Diagnostics ordered/pending: MRI head without contrast to assess brain parenchyma    VTE prophylaxis: Mechanical prophylaxis: Place SCDs  None: Reason for No Pharmacological VTE Prophylaxis: Holding x 24 hours s/p treatment with alteplase (tPA)    BP parameters: Infarct: Post tPA, SBP <180

## 2020-10-10 NOTE — HOSPITAL COURSE
10/10: Admit NCC: s/p TPA, CTA, no IR, -200  10/11: NAEON. MRI completed with no hemorraghic conversion. Start ASA 81 mg and SQH.  Neuro exam stable. Step down to Stroke.

## 2020-10-10 NOTE — PLAN OF CARE
Problem: SLP Goal  Goal: SLP Goal  Description: Speech Language Pathology Goals  Goals expected to be met by 10/17:  1. Pt will tolerate regular diet with thin liquids following safe swallow precautions.   2. Pt will participate in evaluation of speech/language/cognitive skills to determine future therapeutic plan of care.     Outcome: Ongoing, Progressing  Pt seen by ST for bedside swallow evaluation. SLP recommend regular diet with thin liquids following safe swallow precautions. Continue ST per POC.    Gris Smith CF-SLP  10/10/2020

## 2020-10-10 NOTE — PLAN OF CARE
Problem: Occupational Therapy Goal  Goal: Occupational Therapy Goal  Description: Goals to be met by: 10/24/2020    Patient will increase functional independence with ADLs by performing:    UE Dressing with Set-up Assistance.  LE Dressing (mireille/doff socks) with Set-up Assistance.  Grooming while standing at sink with Contact Guard Assistance.  Toileting from toilet with Contact Guard Assistance for hygiene and clothing management.   Supine to sit with Stand-by Assistance.  Sit to stand transfer with Contact Guard Assistance with LRAD.  Toilet transfer to toilet with Contact Guard Assistance with LRAD.    Outcome: Ongoing, Progressing     Pt evaluated and OT goals established.    Beau Rosales, OT   10/10/2020

## 2020-10-11 LAB
ALBUMIN SERPL BCP-MCNC: 3.5 G/DL (ref 3.5–5.2)
ALP SERPL-CCNC: 106 U/L (ref 55–135)
ALT SERPL W/O P-5'-P-CCNC: 25 U/L (ref 10–44)
ANION GAP SERPL CALC-SCNC: 10 MMOL/L (ref 8–16)
ASCENDING AORTA: 3.45 CM
AST SERPL-CCNC: 23 U/L (ref 10–40)
AV INDEX (PROSTH): 0.57
AV MEAN GRADIENT: 3 MMHG
AV PEAK GRADIENT: 5 MMHG
AV VALVE AREA: 2.97 CM2
AV VELOCITY RATIO: 0.67
BASOPHILS # BLD AUTO: 0.07 K/UL (ref 0–0.2)
BASOPHILS NFR BLD: 0.7 % (ref 0–1.9)
BILIRUB SERPL-MCNC: 0.5 MG/DL (ref 0.1–1)
BSA FOR ECHO PROCEDURE: 2.2 M2
BUN SERPL-MCNC: 19 MG/DL (ref 6–20)
CALCIUM SERPL-MCNC: 8.8 MG/DL (ref 8.7–10.5)
CHLORIDE SERPL-SCNC: 106 MMOL/L (ref 95–110)
CO2 SERPL-SCNC: 22 MMOL/L (ref 23–29)
CREAT SERPL-MCNC: 0.7 MG/DL (ref 0.5–1.4)
CV ECHO LV RWT: 0.48 CM
DIFFERENTIAL METHOD: NORMAL
DOP CALC AO PEAK VEL: 1.1 M/S
DOP CALC AO VTI: 25.03 CM
DOP CALC LVOT AREA: 5.2 CM2
DOP CALC LVOT DIAMETER: 2.58 CM
DOP CALC LVOT PEAK VEL: 0.74 M/S
DOP CALC LVOT STROKE VOLUME: 74.25 CM3
DOP CALCLVOT PEAK VEL VTI: 14.21 CM
E WAVE DECELERATION TIME: 238.24 MSEC
E/A RATIO: 0.44
E/E' RATIO: 4.32 M/S
ECHO LV POSTERIOR WALL: 1.12 CM (ref 0.6–1.1)
EOSINOPHIL # BLD AUTO: 0.2 K/UL (ref 0–0.5)
EOSINOPHIL NFR BLD: 1.9 % (ref 0–8)
ERYTHROCYTE [DISTWIDTH] IN BLOOD BY AUTOMATED COUNT: 13.3 % (ref 11.5–14.5)
EST. GFR  (AFRICAN AMERICAN): >60 ML/MIN/1.73 M^2
EST. GFR  (NON AFRICAN AMERICAN): >60 ML/MIN/1.73 M^2
FRACTIONAL SHORTENING: 36 % (ref 28–44)
GLUCOSE SERPL-MCNC: 102 MG/DL (ref 70–110)
HCT VFR BLD AUTO: 39.7 % (ref 37–48.5)
HGB BLD-MCNC: 12.7 G/DL (ref 12–16)
IMM GRANULOCYTES # BLD AUTO: 0.02 K/UL (ref 0–0.04)
IMM GRANULOCYTES NFR BLD AUTO: 0.2 % (ref 0–0.5)
INTERVENTRICULAR SEPTUM: 0.75 CM (ref 0.6–1.1)
IVRT: 148.43 MSEC
LA MAJOR: 5.32 CM
LA MINOR: 5.27 CM
LA WIDTH: 4.18 CM
LEFT ATRIUM SIZE: 2.99 CM
LEFT ATRIUM VOLUME INDEX: 26.4 ML/M2
LEFT ATRIUM VOLUME: 56.25 CM3
LEFT INTERNAL DIMENSION IN SYSTOLE: 2.98 CM (ref 2.1–4)
LEFT VENTRICLE DIASTOLIC VOLUME INDEX: 47.99 ML/M2
LEFT VENTRICLE DIASTOLIC VOLUME: 102.08 ML
LEFT VENTRICLE MASS INDEX: 70 G/M2
LEFT VENTRICLE SYSTOLIC VOLUME INDEX: 16.1 ML/M2
LEFT VENTRICLE SYSTOLIC VOLUME: 34.34 ML
LEFT VENTRICULAR INTERNAL DIMENSION IN DIASTOLE: 4.69 CM (ref 3.5–6)
LEFT VENTRICULAR MASS: 149.64 G
LV LATERAL E/E' RATIO: 4.1 M/S
LV SEPTAL E/E' RATIO: 4.56 M/S
LYMPHOCYTES # BLD AUTO: 2.7 K/UL (ref 1–4.8)
LYMPHOCYTES NFR BLD: 26.5 % (ref 18–48)
MAGNESIUM SERPL-MCNC: 1.8 MG/DL (ref 1.6–2.6)
MCH RBC QN AUTO: 29.7 PG (ref 27–31)
MCHC RBC AUTO-ENTMCNC: 32 G/DL (ref 32–36)
MCV RBC AUTO: 93 FL (ref 82–98)
MONOCYTES # BLD AUTO: 0.8 K/UL (ref 0.3–1)
MONOCYTES NFR BLD: 8.1 % (ref 4–15)
MV PEAK A VEL: 0.93 M/S
MV PEAK E VEL: 0.41 M/S
MV STENOSIS PRESSURE HALF TIME: 69.09 MS
MV VALVE AREA P 1/2 METHOD: 3.18 CM2
NEUTROPHILS # BLD AUTO: 6.5 K/UL (ref 1.8–7.7)
NEUTROPHILS NFR BLD: 62.6 % (ref 38–73)
NRBC BLD-RTO: 0 /100 WBC
PHOSPHATE SERPL-MCNC: 3.9 MG/DL (ref 2.7–4.5)
PLATELET # BLD AUTO: 276 K/UL (ref 150–350)
PMV BLD AUTO: 10.2 FL (ref 9.2–12.9)
POCT GLUCOSE: 102 MG/DL (ref 70–110)
POCT GLUCOSE: 82 MG/DL (ref 70–110)
POCT GLUCOSE: 90 MG/DL (ref 70–110)
POTASSIUM SERPL-SCNC: 4 MMOL/L (ref 3.5–5.1)
PROT SERPL-MCNC: 6.7 G/DL (ref 6–8.4)
RA MAJOR: 4.49 CM
RA WIDTH: 3.6 CM
RBC # BLD AUTO: 4.28 M/UL (ref 4–5.4)
RIGHT VENTRICULAR END-DIASTOLIC DIMENSION: 3.06 CM
RV TISSUE DOPPLER FREE WALL SYSTOLIC VELOCITY 1 (APICAL 4 CHAMBER VIEW): 11.67 CM/S
SINUS: 3.56 CM
SODIUM SERPL-SCNC: 138 MMOL/L (ref 136–145)
STJ: 3.3 CM
TDI LATERAL: 0.1 M/S
TDI SEPTAL: 0.09 M/S
TDI: 0.1 M/S
TRICUSPID ANNULAR PLANE SYSTOLIC EXCURSION: 1.92 CM
WBC # BLD AUTO: 10.32 K/UL (ref 3.9–12.7)

## 2020-10-11 PROCEDURE — 25000003 PHARM REV CODE 250: Performed by: STUDENT IN AN ORGANIZED HEALTH CARE EDUCATION/TRAINING PROGRAM

## 2020-10-11 PROCEDURE — 25000003 PHARM REV CODE 250: Performed by: PHYSICIAN ASSISTANT

## 2020-10-11 PROCEDURE — 25000003 PHARM REV CODE 250: Performed by: NURSE PRACTITIONER

## 2020-10-11 PROCEDURE — 63600175 PHARM REV CODE 636 W HCPCS: Performed by: PHYSICIAN ASSISTANT

## 2020-10-11 PROCEDURE — 84100 ASSAY OF PHOSPHORUS: CPT

## 2020-10-11 PROCEDURE — 80053 COMPREHEN METABOLIC PANEL: CPT

## 2020-10-11 PROCEDURE — 99233 SBSQ HOSP IP/OBS HIGH 50: CPT | Mod: ,,, | Performed by: PSYCHIATRY & NEUROLOGY

## 2020-10-11 PROCEDURE — 11000001 HC ACUTE MED/SURG PRIVATE ROOM

## 2020-10-11 PROCEDURE — 99900035 HC TECH TIME PER 15 MIN (STAT)

## 2020-10-11 PROCEDURE — 83735 ASSAY OF MAGNESIUM: CPT

## 2020-10-11 PROCEDURE — 99233 PR SUBSEQUENT HOSPITAL CARE,LEVL III: ICD-10-PCS | Mod: ,,, | Performed by: PSYCHIATRY & NEUROLOGY

## 2020-10-11 PROCEDURE — 94761 N-INVAS EAR/PLS OXIMETRY MLT: CPT

## 2020-10-11 PROCEDURE — 85025 COMPLETE CBC W/AUTO DIFF WBC: CPT

## 2020-10-11 RX ORDER — HEPARIN SODIUM 5000 [USP'U]/ML
5000 INJECTION, SOLUTION INTRAVENOUS; SUBCUTANEOUS EVERY 8 HOURS
Status: DISCONTINUED | OUTPATIENT
Start: 2020-10-11 | End: 2020-10-12 | Stop reason: HOSPADM

## 2020-10-11 RX ORDER — NAPROXEN SODIUM 220 MG/1
81 TABLET, FILM COATED ORAL DAILY
Status: DISCONTINUED | OUTPATIENT
Start: 2020-10-11 | End: 2020-10-12 | Stop reason: HOSPADM

## 2020-10-11 RX ORDER — CLOPIDOGREL BISULFATE 75 MG/1
75 TABLET ORAL DAILY
Status: DISCONTINUED | OUTPATIENT
Start: 2020-10-11 | End: 2020-10-12 | Stop reason: HOSPADM

## 2020-10-11 RX ORDER — AMLODIPINE BESYLATE 10 MG/1
10 TABLET ORAL DAILY
Status: DISCONTINUED | OUTPATIENT
Start: 2020-10-11 | End: 2020-10-12 | Stop reason: HOSPADM

## 2020-10-11 RX ADMIN — HEPARIN SODIUM 5000 UNITS: 5000 INJECTION INTRAVENOUS; SUBCUTANEOUS at 09:10

## 2020-10-11 RX ADMIN — ASPIRIN 81 MG: 81 TABLET, CHEWABLE ORAL at 10:10

## 2020-10-11 RX ADMIN — CLOPIDOGREL 75 MG: 75 TABLET, FILM COATED ORAL at 12:10

## 2020-10-11 RX ADMIN — HEPARIN SODIUM 5000 UNITS: 5000 INJECTION INTRAVENOUS; SUBCUTANEOUS at 02:10

## 2020-10-11 RX ADMIN — AMLODIPINE BESYLATE 10 MG: 10 TABLET ORAL at 10:10

## 2020-10-11 RX ADMIN — ESCITALOPRAM OXALATE 5 MG: 5 TABLET, FILM COATED ORAL at 09:10

## 2020-10-11 RX ADMIN — ATORVASTATIN CALCIUM 40 MG: 20 TABLET, FILM COATED ORAL at 09:10

## 2020-10-11 NOTE — PROGRESS NOTES
Nursing Transfer Note       Transfer To 911    Transfer via wheelchair    Transfer with cardiac monitoring    Transported by RN     Medicines sent: no    Chart sent with patient: Yes    Belongings sent with patient: (specify belongings): clothes; phone, , family brought other belongings    Notified: son    Bedside Neuro assessment performed: Yes    Bedside Handoff given to: CEE Kapadia     Upon arrival to floor: cardiac monitor applied, patient oriented to room, call bell in reach and bed in lowest position

## 2020-10-11 NOTE — ASSESSMENT & PLAN NOTE
- VN following -- medically stable to step down   - CTA shows L ICA occlusion no LVO no IR s/p tPA  - MRI brain 24 hr with no hemorrhagic conversion   - NIH 0  -  -200  - Q 1 vital signs   - Q 1 neuro checks  - Atorvastatin daily   - Start ASA 81 mg daily  - Start SQH  - PT/OT/SLP eval and treat

## 2020-10-11 NOTE — PROGRESS NOTES
Pt transported to and from MRI by RNx2 with cardiac monitor and Ambu bag without complication. Pt now back in room, reoriented to surroundings. Will continue to monitor.

## 2020-10-11 NOTE — NURSING
Received patient from Neuro ICU ~1730. NIH of 1 on transfer to NPU. Assessment and vitals are stable. The patient remains alert and oriented x4. First set of vitals WNL. Will continue to monitor for any changes in condition.

## 2020-10-11 NOTE — PLAN OF CARE
[facility] Care Plan    POC reviewed with Rosalia Fagan at 0300. Pt verbalized understanding. Questions and concerns addressed. No acute events overnight. Pt taken to MRI at 2000 with no complications during travel. Addressed sleep/rest enhancement and bowel/bladder regimen with patient overnight. q6 accuchecks, did not require coverage this shift. VSS, no neuro change overnight. Pt progressing toward goals. Will continue to monitor. See below and flowsheets for full assessment and VS info.       Neuro:  Gallup Coma Scale  Best Eye Response: 4-->(E4) spontaneous  Best Motor Response: 6-->(M6) obeys commands  Best Verbal Response: 5-->(V5) oriented  Va Coma Scale Score: 15  Assessment Qualifiers: patient not sedated/intubated, no eye obstruction present        24hr Temp:  [97.7 °F (36.5 °C)-98.7 °F (37.1 °C)]     CV:   Rhythm: normal sinus rhythm  BP goals:   SBP < 180      Resp:   O2 Device (Oxygen Therapy): nasal cannula       Plan: wean from nasal cannula during the day    GI/:  MICHELLE Total Score: 20  Diet/Nutrition Received: other (see comments)(cardiac)  Last Bowel Movement: 10/11/20  Voiding Characteristics: voids spontaneously without difficulty    Intake/Output Summary (Last 24 hours) at 10/11/2020 0640  Last data filed at 10/11/2020 0405  Gross per 24 hour   Intake 695 ml   Output 1450 ml   Net -755 ml     Unmeasured Output  Urine Occurrence: 0  Stool Occurrence: 0  Emesis Occurrence: 0  Pad Count: 0    Labs/Accuchecks:  Recent Labs   Lab 10/11/20  0311   WBC 10.32   RBC 4.28   HGB 12.7   HCT 39.7         Recent Labs   Lab 10/11/20  0311      K 4.0   CO2 22*      BUN 19   CREATININE 0.7   ALKPHOS 106   ALT 25   AST 23   BILITOT 0.5      Recent Labs   Lab 10/09/20  2115   INR 1.1   APTT 30.9    No results for input(s): CPK, CPKMB, TROPONINI, MB in the last 168 hours.    Electrolytes: N/A - electrolytes WDL  Accuchecks: Q6H    Gtts:      LDA/Wounds:  Lines/Drains/Airways     Peripheral  Intravenous Line                 Peripheral IV - Single Lumen 10/10/20 0010 20 G Left Antecubital 1 day         Peripheral IV - Single Lumen 10/10/20 0010 20 G Right Hand 1 day              Wounds: No  Wound care consulted: No

## 2020-10-11 NOTE — ASSESSMENT & PLAN NOTE
61 y/o female with recent Left MCA/RAMA borderzone infarcts; at the background of left ICA occlusion with transfer post t-PA for worsening residual or recurring RSW.      Current presentation; negative for acute stroke; not ADC correlate; likely hypoperfusion related events   Reset / Optimize SBP goals - avoid hypotension; target 140-160s on long term basis      Antithrombotics: once out of window of TPA , DAPT    Statins : lipitor 40 mg daily    Aggressive risk factor modification: HTN, HLD, Diet, Exercise, Obesity     Rehab efforts: The patient has been evaluated by a stroke team provider and the therapy needs have been fully considered based off the presenting complaints and exam findings. The following therapy evaluations are needed: PT evaluate and treat, OT evaluate and treat, SLP evaluate and treat - Rehab recs    Diagnostics ordered/pending: None     VTE prophylaxis: Mechanical prophylaxis: Place SCDs; heparin post t-pa window     BP parameters: Infarct: Post tPA, SBP <180

## 2020-10-11 NOTE — SUBJECTIVE & OBJECTIVE
Neurologic Chief Complaint: Left MCA stroke     Subjective:     Interval History: Patient is seen for follow-up neurological assessment and treatment recommendations:     NAEON. No concerns for worsening NIHSS.   OT/PT recs rehab     HPI, Past Medical, Family, and Social History remains the same as documented in the initial encounter.     Review of Systems   Unable to perform ROS: Acuity of condition   Constitutional: Negative for fever.   Respiratory: Negative for shortness of breath.    Cardiovascular: Negative for chest pain.   Gastrointestinal: Negative for abdominal pain.   Neurological: Positive for facial asymmetry and weakness. Negative for seizures and headaches.   Psychiatric/Behavioral: Negative for agitation, behavioral problems and confusion.     Scheduled Meds:   amLODIPine  10 mg Oral Daily    aspirin  81 mg Oral Daily    atorvastatin  40 mg Oral Daily    escitalopram oxalate  5 mg Oral Daily    heparin (porcine)  5,000 Units Subcutaneous Q8H    senna-docusate 8.6-50 mg  1 tablet Oral BID     Continuous Infusions:  PRN Meds:labetaloL, sodium chloride 0.9%    Objective:     Vital Signs (Most Recent):  Temp: 97.8 °F (36.6 °C) (10/11/20 0705)  Pulse: 90 (10/11/20 1005)  Resp: 20 (10/11/20 1005)  BP: (!) 151/69 (10/11/20 1005)  SpO2: (!) 94 % (10/11/20 1005)  BP Location: Right arm    Vital Signs Range (Last 24H):  Temp:  [97.7 °F (36.5 °C)-98 °F (36.7 °C)]   Pulse:  [65-94]   Resp:  [13-33]   BP: (122-168)/(49-89)   SpO2:  [90 %-99 %]   BP Location: Right arm    Physical Exam  HENT:      Head: Normocephalic and atraumatic.   Eyes:      Extraocular Movements: Extraocular movements intact.      Pupils: Pupils are equal, round, and reactive to light.   Cardiovascular:      Rate and Rhythm: Rhythm irregular.   Pulmonary:      Effort: No respiratory distress.   Abdominal:      Palpations: Abdomen is soft.         Neurological Exam:   LOC: alert  Attention Span: Good   Language: No aphasia  Articulation:  No dysarthria  Orientation: Person, Place, Time   Visual Fields: Full  EOM (CN III, IV, VI): Full/intact  Pupils (CN II, III): PERRL  Facial Sensation (CN V): Normal  Facial Movement (CN VII): Subtle Lower facial weakness on the Right  Motor: RLE drift; asymmetry on strength - proximal; distal right > left    Cebellar: No evidence of appendicular or axial ataxia  Sensation: Intact to touch   Tone: Normal tone throughout    Laboratory:  CMP:   Recent Labs   Lab 10/11/20  0311   CALCIUM 8.8   ALBUMIN 3.5   PROT 6.7      K 4.0   CO2 22*      BUN 19   CREATININE 0.7   ALKPHOS 106   ALT 25   AST 23   BILITOT 0.5     BMP:   Recent Labs   Lab 10/11/20  0311      K 4.0      CO2 22*   BUN 19   CREATININE 0.7   CALCIUM 8.8     CBC:   Recent Labs   Lab 10/11/20  0311   WBC 10.32   RBC 4.28   HGB 12.7   HCT 39.7      MCV 93   MCH 29.7   MCHC 32.0     Lipid Panel:   Recent Labs   Lab 10/10/20  0004   CHOL 138   LDLCALC 72.0   HDL 51   TRIG 75     Coagulation:   Recent Labs   Lab 10/09/20  2115   INR 1.1   APTT 30.9     Platelet Aggregation Study: No results for input(s): PLTAGG, PLTAGINTERP, PLTAGREGLACO, ADPPLTAGGREG in the last 168 hours.  Hgb A1C:   Recent Labs   Lab 10/10/20  0004   HGBA1C 5.4     TSH:   Recent Labs   Lab 10/10/20  0004   TSH 4.399*       Diagnostic Results     Brain Imaging   1. No evidence of acute ischemia or recent infarction.  2. Continued maturing infarcts in the left cerebral hemisphere relative to the 08/24/2020 MRI.  Persistent, though decreased degree of restricted diffusion is noted with multiple infarcts in the left centrum semiovale and corona radiata.  Areas of restricted diffusion previously seen in the left basal ganglia have resolved.    Persistent occlusion of the left internal carotid artery with collateral filling/reconstitution at the cavernous segment.     Relative diminutive appearance of the left MCA without high-grade stenosis or occlusion.  This  represents interval change from 08/23/2020.     No intracranial large vessel arterial occlusion.    Cardiac Imaging   Echo:  · There is left ventricular concentric remodeling.  · The left ventricle is normal in size with low normal systolic function. The estimated ejection fraction is 50%.  · Normal right ventricular systolic function.  · Normal left ventricular diastolic function.  · Low central venous pressure.

## 2020-10-11 NOTE — PROGRESS NOTES
[facility]  Vascular Neurology  Comprehensive Stroke Center  Progress Note    Assessment/Plan:     * Acute ischemic left MCA stroke  61 y/o female with recent Left MCA/RAMA borderzone infarcts; at the background of left ICA occlusion with transfer post t-PA for worsening residual or recurring RSW.      Current presentation; negative for acute stroke; not ADC correlate; likely hypoperfusion related events   Reset / Optimize SBP goals - avoid hypotension; target 140-160s on long term basis      Antithrombotics: once out of window of TPA , DAPT    Statins : lipitor 40 mg daily    Aggressive risk factor modification: HTN, HLD, Diet, Exercise, Obesity     Rehab efforts: The patient has been evaluated by a stroke team provider and the therapy needs have been fully considered based off the presenting complaints and exam findings. The following therapy evaluations are needed: PT evaluate and treat, OT evaluate and treat, SLP evaluate and treat - Rehab recs    Diagnostics ordered/pending: None     VTE prophylaxis: Mechanical prophylaxis: Place SCDs; heparin post t-pa window     BP parameters: Infarct: Post tPA, SBP <180      Hypertension  Stroke risk factor  Recent Left MCA/RAMA borderzone infarcts; at the background of left ICA occlusion     Current presentation; negative for acute stroke; likely hypoperfusion related   Reset / Optimize SBP goals - avoid hypotension; target 140-160s on long term basis     Carotid occlusion, left  chronic occlusion  - see section above     Hyperlipemia  Stroke risk factor  LDL 72.0  Lipitor 40 mg daily    COPD (chronic obstructive pulmonary disease)  - Prn duonebs       No notes on file    STROKE DOCUMENTATION   Acute Stroke Times   Last Known Normal Date: 10/09/20  Last Known Normal Time: 2000  Symptom Onset Date: 10/09/20  Symptom Onset Time: 2040  Stroke Team Called Date: 10/09/20  Stroke Team Called Time: 2117  Stroke Team Arrival Date: 10/09/20  Stroke Team Arrival  Time: 2330  CT Interpretation Time: 2127  Decision to Treat Time for Alteplase: 2141(bolus given)    NIH Scale:  1a. Level of Consciousness: 0-->Alert, keenly responsive  1b. LOC Questions: 0-->Answers both questions correctly  1c. LOC Commands: 0-->Performs both tasks correctly  2. Best Gaze: 0-->Normal  3. Visual: 0-->No visual loss  4. Facial Palsy: 1-->Minor paralysis (flattened nasolabial fold, asymmetry on smiling)  5a. Motor Arm, Left: 0-->No drift, limb holds 90 (or 45) degrees for full 10 secs  5b. Motor Arm, Right: 0-->No drift, limb holds 90 (or 45) degrees for full 10 secs  6a. Motor Leg, Left: 0-->No drift, leg holds 30 degree position for full 5 secs  6b. Motor Leg, Right: 1-->Drift, leg falls by the end of the 5-sec period but does not hit bed  7. Limb Ataxia: 0-->Absent  8. Sensory: 0-->Normal, no sensory loss  9. Best Language: 0-->No aphasia, normal  10. Dysarthria: 0-->Normal  11. Extinction and Inattention (formerly Neglect): 0-->No abnormality  Total (NIH Stroke Scale): 2       Modified [place] Score: 1  [place] Coma Scale:15   ABCD2 Score:    [id number] Score:   HAS -BLED Score:   ICH Score:   Hunt [employer] Classification:      Hemorrhagic change of an Ischemic Stroke: Does this patient have an ischemic stroke with hemorrhagic changes? No     Neurologic Chief Complaint: Left MCA stroke     Subjective:     Interval History: Patient is seen for follow-up neurological assessment and treatment recommendations:     NAEON. No concerns for worsening NIHSS.   OT/PT recs rehab     HPI, Past Medical, Family, and Social History remains the same as documented in the initial encounter.     Review of Systems   Unable to perform ROS: Acuity of condition   Constitutional: Negative for fever.   Respiratory: Negative for shortness of breath.    Cardiovascular: Negative for chest pain.   Gastrointestinal: Negative for abdominal pain.   Neurological: Positive for facial asymmetry and weakness. Negative for seizures  and headaches.   Psychiatric/Behavioral: Negative for agitation, behavioral problems and confusion.     Scheduled Meds:   amLODIPine  10 mg Oral Daily    aspirin  81 mg Oral Daily    atorvastatin  40 mg Oral Daily    escitalopram oxalate  5 mg Oral Daily    heparin (porcine)  5,000 Units Subcutaneous Q8H    senna-docusate 8.6-50 mg  1 tablet Oral BID     Continuous Infusions:  PRN Meds:labetaloL, sodium chloride 0.9%    Objective:     Vital Signs (Most Recent):  Temp: 97.8 °F (36.6 °C) (10/11/20 0705)  Pulse: 90 (10/11/20 1005)  Resp: 20 (10/11/20 1005)  BP: (!) 151/69 (10/11/20 1005)  SpO2: (!) 94 % (10/11/20 1005)  BP Location: Right arm    Vital Signs Range (Last 24H):  Temp:  [97.7 °F (36.5 °C)-98 °F (36.7 °C)]   Pulse:  [65-94]   Resp:  [13-33]   BP: (122-168)/(49-89)   SpO2:  [90 %-99 %]   BP Location: Right arm    Physical Exam  HENT:      Head: Normocephalic and atraumatic.   Eyes:      Extraocular Movements: Extraocular movements intact.      Pupils: Pupils are equal, round, and reactive to light.   Cardiovascular:      Rate and Rhythm: Rhythm irregular.   Pulmonary:      Effort: No respiratory distress.   Abdominal:      Palpations: Abdomen is soft.         Neurological Exam:   LOC: alert  Attention Span: Good   Language: No aphasia  Articulation: No dysarthria  Orientation: Person, Place, Time   Visual Fields: Full  EOM (CN III, IV, VI): Full/intact  Pupils (CN II, III): PERRL  Facial Sensation (CN V): Normal  Facial Movement (CN VII): Subtle Lower facial weakness on the Right  Motor: RLE drift; asymmetry on strength - proximal; distal right > left    Cebellar: No evidence of appendicular or axial ataxia  Sensation: Intact to touch   Tone: Normal tone throughout    Laboratory:  CMP:   Recent Labs   Lab 10/11/20  0311   CALCIUM 8.8   ALBUMIN 3.5   PROT 6.7      K 4.0   CO2 22*      BUN 19   CREATININE 0.7   ALKPHOS 106   ALT 25   AST 23   BILITOT 0.5     BMP:   Recent Labs   Lab  10/11/20  0311      K 4.0      CO2 22*   BUN 19   CREATININE 0.7   CALCIUM 8.8     CBC:   Recent Labs   Lab 10/11/20  0311   WBC 10.32   RBC 4.28   HGB 12.7   HCT 39.7      MCV 93   MCH 29.7   MCHC 32.0     Lipid Panel:   Recent Labs   Lab 10/10/20  0004   CHOL 138   LDLCALC 72.0   HDL 51   TRIG 75     Coagulation:   Recent Labs   Lab 10/09/20  2115   INR 1.1   APTT 30.9     Platelet Aggregation Study: No results for input(s): PLTAGG, PLTAGINTERP, PLTAGREGLACO, ADPPLTAGGREG in the last 168 hours.  Hgb A1C:   Recent Labs   Lab 10/10/20  0004   HGBA1C 5.4     TSH:   Recent Labs   Lab 10/10/20  0004   TSH 4.399*       Diagnostic Results     Brain Imaging   1. No evidence of acute ischemia or recent infarction.  2. Continued maturing infarcts in the left cerebral hemisphere relative to the 08/24/2020 MRI.  Persistent, though decreased degree of restricted diffusion is noted with multiple infarcts in the left centrum semiovale and corona radiata.  Areas of restricted diffusion previously seen in the left basal ganglia have resolved.    Persistent occlusion of the left internal carotid artery with collateral filling/reconstitution at the cavernous segment.     Relative diminutive appearance of the left MCA without high-grade stenosis or occlusion.  This represents interval change from 08/23/2020.     No intracranial large vessel arterial occlusion.    Cardiac Imaging   Echo:  · There is left ventricular concentric remodeling.  · The left ventricle is normal in size with low normal systolic function. The estimated ejection fraction is 50%.  · Normal right ventricular systolic function.  · Normal left ventricular diastolic function.  · Low central venous pressure.      Vandana Farmer MD  Comprehensive Stroke Center  Department of Vascular Neurology   [facility]

## 2020-10-11 NOTE — ASSESSMENT & PLAN NOTE
Stroke risk factor  Recent Left MCA/RAMA borderzone infarcts; at the background of left ICA occlusion     Current presentation; negative for acute stroke; likely hypoperfusion related   Reset / Optimize SBP goals - avoid hypotension; target 140-160s on long term basis

## 2020-10-11 NOTE — PROGRESS NOTES
[facility]  Neurocritical Care  Progress Note    Admit Date: 10/9/2020  Service Date: 10/11/2020  Length of Stay: 1    Subjective:     Chief Complaint: Acute ischemic left MCA stroke    History of Present Illness: Rosalia Fagan is a 60-year-old female with a pmh of CVA, HTN, HLD, pancreatitis  who presents to [facility] s/p TPA for L MCA syndrome. She went to [facility] ED with acute onset of right-sided weakness and right-sided facial droop.  Symptoms started approximately 30 minutes prior to arrival.  Her son also reported that her speech was altered.  This was similar to a stroke she had back in August for which she had recovered completely from. CTA showed chronic L ICA occlusion but no LVO. She is being admitted to [facility] for a higher level of care and close neurologic monitoring.     Hospital Course: 10/10: Admit NCC: s/p TPA, CTA, no IR, -200  10/11: NAEON. MRI completed with no hemorraghic conversion. Start ASA 81 mg and SQH.  Neuro exam stable. Step down to Stroke.     Interval History: NAEON. MRI completed with no hemorraghic conversion. Start ASA 81 mg and SQH.  NIH 2. Step down to Stroke.     Review of Systems:  Constitutional: Negative for fever.   Respiratory: Negative for shortness of breath.    Cardiovascular: Negative for chest pain.   Gastrointestinal: Negative for abdominal pain.   Neurological: Positive for facial asymmetry and weakness. Negative for seizures and headaches.   Psychiatric/Behavioral: Negative for agitation, behavioral problems and confusion.     Vitals:   Temp: 98.2 °F (36.8 °C)  Pulse: 88  Rhythm: normal sinus rhythm  BP: (!) 160/70  MAP (mmHg): 101  Resp: (!) 28  SpO2: 95 %  O2 Device (Oxygen Therapy): room air    Temp  Min: 97.7 °F (36.5 °C)  Max: 98.2 °F (36.8 °C)  Pulse  Min: 65  Max: 94  BP  Min: 122/74  Max: 168/78  MAP (mmHg)  Min: 87  Max: 115  Resp  Min: 13  Max: 33  SpO2  Min: 90 %  Max: 99 %    10/10 0701 - 10/11 0700  In: 695 [P.O.:450; I.V.:245]  Out: 1450  [Urine:1450]   Unmeasured Output  Urine Occurrence: 1  Stool Occurrence: 0  Emesis Occurrence: 0  Pad Count: 0     Examination:   Constitutional: Well-nourished and -developed. No apparent distress.   Eyes: Conjunctiva clear, anicteric. Lids no lesions.  Head/Ears/Nose/Mouth/Throat/Neck: Moist mucous membranes. External ears, nose atraumatic.   Cardiovascular: Regular rhythm. No murmurs. No leg edema.  Respiratory: Comfortable respirations. Clear to auscultation.  Gastrointestinal: No hernia. Soft, nondistended, nontender. + bowel sounds.    Neurologic:  -GCS E4 V5 M6  -Alert. Oriented to person, place, and time. Speech fluent. Follows commands.  -Mild right facial droop, otherwise Cranial nerves II-XII grossly intact  -Motor 4/5 RLE, otherwise 5/5  -Sensation intact  Current NIH Stroke Score Values      Most Recent Value   Interval  24 hrs post onset of symptoms +/- 20 mins   1a. Level of Consciousness  0-->Alert, keenly responsive   1b. LOC Questions  0-->Answers both questions correctly   1c. LOC Commands  0-->Performs both tasks correctly   2. Best Gaze  0-->Normal   3. Visual  0-->No visual loss   4. Facial Palsy  1-->Minor paralysis (flattened nasolabial fold, asymmetry on smiling)   5a. Motor Arm, Left  0-->No drift, limb holds 90 (or 45) degrees for full 10 secs   5b. Motor Arm, Right  0-->No drift, limb holds 90 (or 45) degrees for full 10 secs   6a. Motor Leg, Left  0-->No drift, leg holds 30 degree position for full 5 secs   6b. Motor Leg, Right  1-->Drift, leg falls by the end of the 5-sec period but does not hit bed   7. Limb Ataxia  0-->Absent   8. Sensory  0-->Normal, no sensory loss   9. Best Language  0-->No aphasia, normal   10. Dysarthria  0-->Normal   11. Extinction and Inattention (formerly Neglect)  0-->No abnormality   Total (NIH Stroke Scale)  2            Medications:   Continuous ScheduledamLODIPine, 10 mg, Daily  aspirin, 81 mg, Daily  atorvastatin, 40 mg, Daily  clopidogreL, 75 mg,  Daily  escitalopram oxalate, 5 mg, Daily  heparin (porcine), 5,000 Units, Q8H  senna-docusate 8.6-50 mg, 1 tablet, BID    PRNlabetaloL, 10 mg, Q6H PRN  sodium chloride 0.9%, 10 mL, PRN       Today I independently reviewed pertinent medications, lines/drains/airways, imaging, cardiology results, laboratory results, microbiology results, notably:   Narrative & Impression     EXAMINATION:  MRI BRAIN WITHOUT CONTRAST     CLINICAL HISTORY:  Cerebral hemorrhage suspected;Neuro deficit, acute, persistent or progressing;Stroke, follow up;Neuro deficit, acute, stroke suspected;between 1643 and 2143, stroke f/u;     TECHNIQUE:  Multiplanar multisequence MR imaging of the brain was performed without contrast.     COMPARISON:  CTA head performed 10/09/2020.  CT head performed 10/09/2020.  MRI brain performed 08/24/2020     FINDINGS:  Parenchyma: There is persistent, though reduced degree of restricted diffusion involving multiple now subacute infarcts involving the left centrum semiovale and corona radiata.Additional areas of acute restricted diffusion identified on the 08/24/2020 MRI involving the left basal ganglia have resolved.The current examination, no definite new areas of acute restricted diffusion are identified.  Scattered areas of T2/FLAIR hyperintense signal elsewhere in the frontoparietal white matter are again compatible with sequela of chronic small vessel ischemic disease.  Generalized age-related parenchymal volume loss is again appreciated.     Additional comments: There is no midline shift, abnormal extra-axial fluid collection, or acute intracranial hemorrhage. The basal cisterns are patent.     Ventricles: Normal.     Flow voids: Again noted is loss of the visualized distal cervical and proximal intracranial ICA flow void, with reconstitution at the cavernous segment.  There is asymmetrically diminutive flow void of the left MCA proximal segment compared to the contralateral side.  These findings are  compatible with the known proximal ICA occlusion demonstrated on recent CTA of 10/09/2020.  Elsewhere, and the major intracranial vascular flow voids appear maintained.     Sinuses and mastoid air cells: Scattered relatively minor degree of paranasal sinus mucosal thickening.  Persistent fluid opacification of the right-greater-than-left mastoid air cells, possibly on the basis of effusion and/or mucosal thickening.     Orbits: Normal     Midline structures: The pituitary and craniocervical junction are normal.     Marrow: Normal        Impression:     1. No evidence of acute ischemia or recent infarction.  2. Continued maturing infarcts in the left cerebral hemisphere relative to the 08/24/2020 MRI.  Persistent, though decreased degree of restricted diffusion is noted with multiple infarcts in the left centrum semiovale and corona radiata.  Areas of restricted diffusion previously seen in the left basal ganglia have resolved.        Electronically signed by: Matt Rosa  Date:                                            10/11/2020  Time:                                           06:08       ISTAT: No results for input(s): PH, PCO2, PO2, POCSATURATED, HCO3, BE, POCNA, POCK, POCTCO2, POCGLU, POCICA, POCLAC, SAMPLE in the last 24 hours.   Chem:   Recent Labs   Lab 10/11/20  0311      K 4.0      CO2 22*      BUN 19   CREATININE 0.7   ESTGFRAFRICA >60.0   EGFRNONAA >60.0   CALCIUM 8.8   MG 1.8   PHOS 3.9   ANIONGAP 10   PROT 6.7   ALBUMIN 3.5   BILITOT 0.5   ALKPHOS 106   AST 23   ALT 25     Heme:   Recent Labs   Lab 10/11/20  0311   WBC 10.32   HGB 12.7   HCT 39.7        Endo:   Recent Labs   Lab 10/11/20  0113 10/11/20  0605 10/11/20  1148   POCTGLUCOSE 102 82 90        Assessment/Plan:     Neuro  * Acute ischemic left MCA stroke  - VN following -- medically stable to step down   - CTA shows L ICA occlusion no LVO no IR s/p tPA  - MRI brain 24 hr with no hemorrhagic conversion   -   -200  - Q 1 vital signs   - Q 1 neuro checks  - Atorvastatin daily   - Start ASA 81 mg daily  - Start SQH  - PT/OT/SLP eval and treat     Cytotoxic brain edema  - see stroke    Pulmonary  COPD (chronic obstructive pulmonary disease)  - duo nebs prn   - SPO2 > 92%    Cardiac/Vascular  Carotid occlusion, left  - chronic seen on CTA    Hyperlipemia  - atorvastatin daily     Hypertension  - Echo and EKG   - -200            The patient is being Prophylaxed for:  Venous Thromboembolism with: Mechanical or Chemical  Stress Ulcer with: Not Applicable   Ventilator Pneumonia with: not applicable    Activity Orders          Diet Cardiac: Cardiac starting at 10/10 0910        Full Code   Level III care    Sarahy Dias PA-C  Neurocritical Care  [facility]

## 2020-10-12 ENCOUNTER — TELEPHONE (OUTPATIENT)
Dept: PHYSICAL MEDICINE AND REHAB | Facility: CLINIC | Age: 60
End: 2020-10-12

## 2020-10-12 VITALS
TEMPERATURE: 99 F | HEART RATE: 94 BPM | BODY MASS INDEX: 35.36 KG/M2 | DIASTOLIC BLOOD PRESSURE: 85 MMHG | RESPIRATION RATE: 18 BRPM | WEIGHT: 225.31 LBS | HEIGHT: 67 IN | SYSTOLIC BLOOD PRESSURE: 150 MMHG | OXYGEN SATURATION: 97 %

## 2020-10-12 PROBLEM — Z86.73 H/O ISCHEMIC LEFT MCA STROKE: Status: ACTIVE | Noted: 2020-08-27

## 2020-10-12 LAB
ALBUMIN SERPL BCP-MCNC: 3.7 G/DL (ref 3.5–5.2)
ALP SERPL-CCNC: 116 U/L (ref 55–135)
ALT SERPL W/O P-5'-P-CCNC: 25 U/L (ref 10–44)
ANION GAP SERPL CALC-SCNC: 12 MMOL/L (ref 8–16)
AST SERPL-CCNC: 27 U/L (ref 10–40)
BASOPHILS # BLD AUTO: 0.07 K/UL (ref 0–0.2)
BASOPHILS NFR BLD: 0.6 % (ref 0–1.9)
BILIRUB SERPL-MCNC: 0.7 MG/DL (ref 0.1–1)
BUN SERPL-MCNC: 18 MG/DL (ref 6–20)
CALCIUM SERPL-MCNC: 9.2 MG/DL (ref 8.7–10.5)
CHLORIDE SERPL-SCNC: 104 MMOL/L (ref 95–110)
CO2 SERPL-SCNC: 21 MMOL/L (ref 23–29)
CREAT SERPL-MCNC: 0.7 MG/DL (ref 0.5–1.4)
DIFFERENTIAL METHOD: ABNORMAL
EOSINOPHIL # BLD AUTO: 0.2 K/UL (ref 0–0.5)
EOSINOPHIL NFR BLD: 1.7 % (ref 0–8)
ERYTHROCYTE [DISTWIDTH] IN BLOOD BY AUTOMATED COUNT: 13.2 % (ref 11.5–14.5)
EST. GFR  (AFRICAN AMERICAN): >60 ML/MIN/1.73 M^2
EST. GFR  (NON AFRICAN AMERICAN): >60 ML/MIN/1.73 M^2
GLUCOSE SERPL-MCNC: 97 MG/DL (ref 70–110)
HCT VFR BLD AUTO: 42.7 % (ref 37–48.5)
HGB BLD-MCNC: 13.7 G/DL (ref 12–16)
IMM GRANULOCYTES # BLD AUTO: 0.04 K/UL (ref 0–0.04)
IMM GRANULOCYTES NFR BLD AUTO: 0.4 % (ref 0–0.5)
LYMPHOCYTES # BLD AUTO: 2.3 K/UL (ref 1–4.8)
LYMPHOCYTES NFR BLD: 20.1 % (ref 18–48)
MAGNESIUM SERPL-MCNC: 1.8 MG/DL (ref 1.6–2.6)
MCH RBC QN AUTO: 29.7 PG (ref 27–31)
MCHC RBC AUTO-ENTMCNC: 32.1 G/DL (ref 32–36)
MCV RBC AUTO: 92 FL (ref 82–98)
MONOCYTES # BLD AUTO: 0.9 K/UL (ref 0.3–1)
MONOCYTES NFR BLD: 7.7 % (ref 4–15)
NEUTROPHILS # BLD AUTO: 7.8 K/UL (ref 1.8–7.7)
NEUTROPHILS NFR BLD: 69.5 % (ref 38–73)
NRBC BLD-RTO: 0 /100 WBC
PHOSPHATE SERPL-MCNC: 4.1 MG/DL (ref 2.7–4.5)
PLATELET # BLD AUTO: 295 K/UL (ref 150–350)
PMV BLD AUTO: 10.6 FL (ref 9.2–12.9)
POCT GLUCOSE: 101 MG/DL (ref 70–110)
POCT GLUCOSE: 96 MG/DL (ref 70–110)
POTASSIUM SERPL-SCNC: 3.6 MMOL/L (ref 3.5–5.1)
PROT SERPL-MCNC: 7.2 G/DL (ref 6–8.4)
RBC # BLD AUTO: 4.62 M/UL (ref 4–5.4)
SODIUM SERPL-SCNC: 137 MMOL/L (ref 136–145)
WBC # BLD AUTO: 11.28 K/UL (ref 3.9–12.7)

## 2020-10-12 PROCEDURE — 99233 SBSQ HOSP IP/OBS HIGH 50: CPT | Mod: ,,, | Performed by: FAMILY MEDICINE

## 2020-10-12 PROCEDURE — 25000003 PHARM REV CODE 250: Performed by: STUDENT IN AN ORGANIZED HEALTH CARE EDUCATION/TRAINING PROGRAM

## 2020-10-12 PROCEDURE — 93005 ELECTROCARDIOGRAM TRACING: CPT

## 2020-10-12 PROCEDURE — 93010 ELECTROCARDIOGRAM REPORT: CPT | Mod: ,,, | Performed by: INTERNAL MEDICINE

## 2020-10-12 PROCEDURE — 84100 ASSAY OF PHOSPHORUS: CPT

## 2020-10-12 PROCEDURE — 85025 COMPLETE CBC W/AUTO DIFF WBC: CPT

## 2020-10-12 PROCEDURE — 25000003 PHARM REV CODE 250: Performed by: PHYSICIAN ASSISTANT

## 2020-10-12 PROCEDURE — 97116 GAIT TRAINING THERAPY: CPT

## 2020-10-12 PROCEDURE — 99233 PR SUBSEQUENT HOSPITAL CARE,LEVL III: ICD-10-PCS | Mod: ,,, | Performed by: FAMILY MEDICINE

## 2020-10-12 PROCEDURE — 80053 COMPREHEN METABOLIC PANEL: CPT

## 2020-10-12 PROCEDURE — 92526 ORAL FUNCTION THERAPY: CPT

## 2020-10-12 PROCEDURE — 63600175 PHARM REV CODE 636 W HCPCS: Performed by: PHYSICIAN ASSISTANT

## 2020-10-12 PROCEDURE — 92523 SPEECH SOUND LANG COMPREHEN: CPT

## 2020-10-12 PROCEDURE — 83735 ASSAY OF MAGNESIUM: CPT

## 2020-10-12 PROCEDURE — 25000003 PHARM REV CODE 250: Performed by: NURSE PRACTITIONER

## 2020-10-12 PROCEDURE — 97535 SELF CARE MNGMENT TRAINING: CPT

## 2020-10-12 PROCEDURE — 93010 EKG 12-LEAD: ICD-10-PCS | Mod: ,,, | Performed by: INTERNAL MEDICINE

## 2020-10-12 PROCEDURE — 36415 COLL VENOUS BLD VENIPUNCTURE: CPT

## 2020-10-12 RX ORDER — ACETAMINOPHEN 325 MG/1
650 TABLET ORAL EVERY 6 HOURS PRN
Status: DISCONTINUED | OUTPATIENT
Start: 2020-10-12 | End: 2020-10-12 | Stop reason: HOSPADM

## 2020-10-12 RX ADMIN — AMLODIPINE BESYLATE 10 MG: 10 TABLET ORAL at 09:10

## 2020-10-12 RX ADMIN — HEPARIN SODIUM 5000 UNITS: 5000 INJECTION INTRAVENOUS; SUBCUTANEOUS at 06:10

## 2020-10-12 RX ADMIN — ATORVASTATIN CALCIUM 40 MG: 20 TABLET, FILM COATED ORAL at 09:10

## 2020-10-12 RX ADMIN — ASPIRIN 81 MG: 81 TABLET, CHEWABLE ORAL at 09:10

## 2020-10-12 RX ADMIN — HEPARIN SODIUM 5000 UNITS: 5000 INJECTION INTRAVENOUS; SUBCUTANEOUS at 03:10

## 2020-10-12 RX ADMIN — ESCITALOPRAM OXALATE 5 MG: 5 TABLET, FILM COATED ORAL at 09:10

## 2020-10-12 RX ADMIN — CLOPIDOGREL 75 MG: 75 TABLET, FILM COATED ORAL at 10:10

## 2020-10-12 NOTE — TELEPHONE ENCOUNTER
----- Message from Vasu Du sent at 10/12/2020  8:33 AM CDT -----  Contact: Darwin (son ) @ [phone]  Caller calling to r/s the 10-14th appt, pls call

## 2020-10-12 NOTE — PLAN OF CARE
Problem: Occupational Therapy Goal  Goal: Occupational Therapy Goal  Description: Goals to be met by: 10/24/2020    Patient will increase functional independence with ADLs by performing:    UE Dressing with Set-up Assistance.  LE Dressing (mireille/doff socks) with Set-up Assistance.  Grooming while standing at sink with Contact Guard Assistance.  Toileting from toilet with Contact Guard Assistance for hygiene and clothing management.   Supine to sit with Stand-by Assistance.  Sit to stand transfer with Contact Guard Assistance with LRAD.  Toilet transfer to toilet with Contact Guard Assistance with LRAD.    Outcome: Ongoing, Progressing       Pt progressing towards goals. Cont OT POC    Once patient is medically stable, patient is safe to discharge home with continued OT services via Outpatient OT.     SANTIAGO Bose  10/12/2020

## 2020-10-12 NOTE — PLAN OF CARE
Pt is progressing towards goals as expected. Goals remain appropriate continue with current POC.     Aydee Martell, PT, DPT  10/12/2020      Problem: Physical Therapy Goal  Goal: Physical Therapy Goal  Description: Goals to be met by: 10/23/20     Patient will increase functional independence with mobility by performin. Supine to sit with Contact Guard Assistance. - goal met 10/12/2020  Updated: independent  2. Sit to supine with Contact Guard Assistance. - goal met 10/12/2020  Updated: Independent  3. Sit to stand transfer with Contact Guard Assistance.  Updated: Independent  4. Bed to chair transfer with Contact Guard Assistance using Rolling Walker PRN.  5. Gait  x 150 feet with Contact Guard Assistance using Rolling Walker PRN.   Updated: Independent  6. Ascend/descend 4 stair with bilateral Handrails Contact Guard Assistance.   7. Lower extremity exercise program x 20 reps per handout, with assistance as needed.    Outcome: Ongoing, Progressing

## 2020-10-12 NOTE — PLAN OF CARE
Problem: SLP Goal  Goal: SLP Goal  Description: Speech Language Pathology Goals  Goals expected to be met by 10/17:    1. Pt will tolerate regular diet with thin liquids following safe swallow precautions.   2. Pt will independently follow complex commands with 100% accuracy.   3. Pt will increase word finding abilities during conversational speech.    Outcome: Ongoing, Progressing   SLP recommends pt continue regular solid and thin liquid diet at this time. Continue POC. SLP to follow up.  Krystin Chavez  10/12/2020

## 2020-10-12 NOTE — PLAN OF CARE
10/12/20 1210   Post-Acute Status   Post-Acute Authorization Other   Other Status No Post-Acute Service Needs       Pt at baseline and will be going home with outpt therapy. No SW needs noted upon D/C.  SW in contact with CM and Medical staff. Will continue to follow and offer support as needed.     Ramon Sellers, ELLIE  [facility]   Ext. [phone]

## 2020-10-12 NOTE — SUBJECTIVE & OBJECTIVE
Neurologic Chief Complaint: Left MCA stroke     Subjective:     Interval History: Patient is seen for follow-up neurological assessment and treatment recommendations:     No acute events overnight. Discharge today.     HPI, Past Medical, Family, and Social History remains the same as documented in the initial encounter.     Review of Systems   Constitutional: Negative for fever.   Respiratory: Negative for shortness of breath.    Cardiovascular: Negative for chest pain.   Gastrointestinal: Negative for abdominal pain.   Neurological: Positive for facial asymmetry and weakness. Negative for seizures and headaches.   Psychiatric/Behavioral: Negative for agitation, behavioral problems and confusion.     Scheduled Meds:   amLODIPine  10 mg Oral Daily    aspirin  81 mg Oral Daily    atorvastatin  40 mg Oral Daily    clopidogreL  75 mg Oral Daily    escitalopram oxalate  5 mg Oral Daily    heparin (porcine)  5,000 Units Subcutaneous Q8H    senna-docusate 8.6-50 mg  1 tablet Oral BID     Continuous Infusions:  PRN Meds:acetaminophen, labetaloL, sodium chloride 0.9%    Objective:     Vital Signs (Most Recent):  Temp: 98.8 °F (37.1 °C) (10/12/20 1234)  Pulse: 94 (10/12/20 1234)  Resp: 16 (10/12/20 1234)  BP: (!) 167/94 (10/12/20 1234)  SpO2: 95 % (10/12/20 1234)  BP Location: Left arm    Vital Signs Range (Last 24H):  Temp:  [97.3 °F (36.3 °C)-98.8 °F (37.1 °C)]   Pulse:  []   Resp:  [16-30]   BP: (125-167)/(67-94)   SpO2:  [95 %-99 %]   BP Location: Left arm    Physical Exam  HENT:      Head: Normocephalic and atraumatic.   Eyes:      Extraocular Movements: Extraocular movements intact.      Pupils: Pupils are equal, round, and reactive to light.   Cardiovascular:      Rate and Rhythm: Rhythm irregular.   Pulmonary:      Effort: No respiratory distress.   Abdominal:      Palpations: Abdomen is soft.         Neurological Exam:   LOC: alert  Attention Span: Good   Language: No aphasia  Articulation: No  dysarthria  Orientation: Person, Place, Time   Visual Fields: Full  EOM (CN III, IV, VI): Full/intact  Pupils (CN II, III): PERRL  Facial Sensation (CN V): Normal  Facial Movement (CN VII): Subtle Lower facial weakness on the Right  Motor: RLE drift; asymmetry on strength - proximal; distal right > left    Cebellar: No evidence of appendicular or axial ataxia  Sensation: Intact to touch   Tone: Normal tone throughout    Laboratory:  CMP:   Recent Labs   Lab 10/12/20  0504   CALCIUM 9.2   ALBUMIN 3.7   PROT 7.2      K 3.6   CO2 21*      BUN 18   CREATININE 0.7   ALKPHOS 116   ALT 25   AST 27   BILITOT 0.7     BMP:   Recent Labs   Lab 10/12/20  0504      K 3.6      CO2 21*   BUN 18   CREATININE 0.7   CALCIUM 9.2     CBC:   Recent Labs   Lab 10/12/20  0504   WBC 11.28   RBC 4.62   HGB 13.7   HCT 42.7      MCV 92   MCH 29.7   MCHC 32.1     Lipid Panel:   Recent Labs   Lab 10/10/20  0004   CHOL 138   LDLCALC 72.0   HDL 51   TRIG 75     Coagulation:   Recent Labs   Lab 10/09/20  2115   INR 1.1   APTT 30.9     Platelet Aggregation Study: No results for input(s): PLTAGG, PLTAGINTERP, PLTAGREGLACO, ADPPLTAGGREG in the last 168 hours.  Hgb A1C:   Recent Labs   Lab 10/10/20  0004   HGBA1C 5.4     TSH:   Recent Labs   Lab 10/10/20  0004   TSH 4.399*       Diagnostic Results     Brain Imaging   1. No evidence of acute ischemia or recent infarction.  2. Continued maturing infarcts in the left cerebral hemisphere relative to the 08/24/2020 MRI.  Persistent, though decreased degree of restricted diffusion is noted with multiple infarcts in the left centrum semiovale and corona radiata.  Areas of restricted diffusion previously seen in the left basal ganglia have resolved.    Persistent occlusion of the left internal carotid artery with collateral filling/reconstitution at the cavernous segment.     Relative diminutive appearance of the left MCA without high-grade stenosis or occlusion.  This  represents interval change from 08/23/2020.     No intracranial large vessel arterial occlusion.    Cardiac Imaging   Echo:  · There is left ventricular concentric remodeling.  · The left ventricle is normal in size with low normal systolic function. The estimated ejection fraction is 50%.  · Normal right ventricular systolic function.  · Normal left ventricular diastolic function.  · Low central venous pressure.

## 2020-10-12 NOTE — PT/OT/SLP PROGRESS
"Occupational Therapy Treatment    Patient Name:  Rosalia Fagan   MRN:  [id number]  Admit Date: 10/9/2020  Admitting Diagnosis:  H/O ischemic left MCA stroke   Length of Stay: 2 days  Recent Surgery: * No surgery found *      Recommendations:     Discharge Recommendations: outpatient OT, home  Discharge Equipment Recommendations:  none  Barriers to discharge:  None    Plan:     Patient to be seen 4 x/week to address the above listed problems via self-care/home management, therapeutic activities, therapeutic exercises, neuromuscular re-education  · Plan of Care Expires: 11/10/20  · Plan of Care Reviewed with: patient    Assessment:   Rosalia Fagan is a 60 y.o. female with a medical diagnosis of H/O ischemic left MCA stroke.  She presents with the following performance deficits affecting function: weakness, impaired endurance, impaired self care skills, impaired functional mobilty, gait instability, impaired balance, impaired cardiopulmonary response to activity, decreased upper extremity function, decreased lower extremity function.  Pt continues to benefit from a collaborative PT/OT/SLP program to improve quality of life and focus on recovery of impairments.     Once patient is medically stable, patient is safe to discharge home with continued OT services via Outpatient OT.       Rehab Prognosis: Good; patient would benefit from acute skilled OT services to address these deficits and reach maximum level of function.        Subjective   Communicated with: RN prior to session.  Patient found HOB elevated with bed alarm, peripheral IV, telemetry upon OT entry to room.    Patient: "I feel this is the way I was before" referring to improved functional independence to PLOF.     Pain/Comfort:  · Pain Rating 1: 0/10  · Pain Rating Post-Intervention 1: 0/10    Objective:   Patient found with: bed alarm, peripheral IV, telemetry   General Precautions: Standard, Cardiac aphasia, aspiration, fall   Orthopedic " "Precautions:N/A   Braces: N/A   Oxygen Device: Room Air  Vitals: BP (!) 150/85   Pulse 94   Temp 98.8 °F (37.1 °C) (Oral)   Resp 18   Ht 5' 7" (1.702 m)   Wt 102.2 kg (225 lb 5 oz)   LMP  (LMP Unknown)   SpO2 97%   BMI 35.29 kg/m²     Outcome Measures:  AMPA 6 Click ADL: 18    Cognition:   · Oriented X 4  · Command following: follows one-step commands  · Communication: dysarthria and clear/fluent    Occupational Performance:  Bed Mobility:    · Patient completed Rolling/Turning to Right with modified independence  · Patient completed Supine to Sit with modified independence and with side rail on R side of bed    Functional Mobility/Transfers:   Static Sitting EOB: SBA   Dynamic Sitting EOB: SBA   Patient completed Sit <> Stand Transfer with supervision  with  rolling walker    Static Standing Balance: SBA   Dynamic Standing Balance: SBA with RW   Patient completed Toilet Transfer Step Transfer technique with stand by assistance with  rolling walker      Activities of Daily Living:  · Grooming: stand by assistance in standing at sink  · Lower Body Dressing: stand by assistance seated EOB  · Toileting: stand by assistance at toilet level.     AMPA 6 Click ADL:  AMPAC Total Score: 18    Treatment & Education:  -Pt education on OT role and POC   -Importance of E/OOB activity with staff assistance, emphasis on daily participation  -Multiple self-care tasks and functional mobility completed -- assistance level noted above  -Safety during functional transfer and mobility ensured  -Patient provided with education on importance of Bilateral UB/LB integration during functional tasks for neuro pathway redevelopment needs.   -Communication White board updated, orientation assessed and provided  -Education provided reviewed, questions answered within OT scope of practice.            Patient left HOB elevated with all lines intact, call button in reach, bed alarm on and RN notified    GOALS:   Multidisciplinary " Problems     Occupational Therapy Goals        Problem: Occupational Therapy Goal    Goal Priority Disciplines Outcome Interventions   Occupational Therapy Goal     OT, PT/OT Ongoing, Progressing    Description: Goals to be met by: 10/24/2020    Patient will increase functional independence with ADLs by performing:    UE Dressing with Set-up Assistance.  LE Dressing (mirielle/doff socks) with Set-up Assistance.  Grooming while standing at sink with Contact Guard Assistance.  Toileting from toilet with Contact Guard Assistance for hygiene and clothing management.   Supine to sit with Stand-by Assistance.  Sit to stand transfer with Contact Guard Assistance with LRAD.  Toilet transfer to toilet with Contact Guard Assistance with LRAD.                     Time Tracking:     OT Date of Treatment: 10/12/20  OT Start Time: 0955  OT Stop Time: 1005  OT Total Time (min): 10 min  Additional staff present: PT      Billable Minutes:Self Care/Home Management 10      ASNTIAGO Bose  10/12/2020

## 2020-10-12 NOTE — PROGRESS NOTES
[facility]  Vascular Neurology  Comprehensive Stroke Center  Progress Note    Assessment/Plan:     * H/O ischemic left MCA stroke  59 y/o female with recent Left MCA/RAMA borderzone infarcts; at the background of left ICA occlusion with transfer post t-PA for worsening residual or recurring RSW.      Current presentation; negative for acute stroke; not ADC correlate; likely hypoperfusion related events.   Reset / Optimize SBP goals - avoid hypotension; target 140-160s on long term basis      Antithrombotics: DAPT    Statins : lipitor 40 mg daily    Aggressive risk factor modification: HTN, HLD, Diet, Exercise, Obesity     Rehab efforts: The patient has been evaluated by a stroke team provider and the therapy needs have been fully considered based off the presenting complaints and exam findings. The following therapy evaluations are needed: PT evaluate and treat, OT evaluate and treat, SLP evaluate and treat - resume outpatient PT/OT    Diagnostics ordered/pending: None     VTE prophylaxis: Mechanical prophylaxis: Place SCDs; heparin 5000 units q8h     BP parameters: Infarct: Post tPA, SBP <180        Cytotoxic brain edema  As seen on cerebral imaging due to stroke    Carotid occlusion, left  chronic occlusion  - see section above     Hyperlipemia  Stroke risk factor  LDL 72.0  Lipitor 40 mg daily    COPD (chronic obstructive pulmonary disease)  - Prn duonebs       Hypertension  Stroke risk factor  Recent Left MCA/RAMA borderzone infarcts; at the background of left ICA occlusion     Current presentation; negative for acute stroke; likely hypoperfusion related   Reset / Optimize SBP goals - avoid hypotension; target 140-160s on long term basis             10/11: NAEON. No concerns for worsening NIHSS. OT/PT recs rehab   10/12: No acute events overnight. Discharge today.     STROKE DOCUMENTATION   Acute Stroke Times   Last Known Normal Date: 10/09/20  Last Known Normal Time: 2000  Symptom Onset Date:  10/09/20  Symptom Onset Time: 2040  Stroke Team Called Date: 10/09/20  Stroke Team Called Time: 2117  Stroke Team Arrival Date: 10/09/20  Stroke Team Arrival Time: 2330  CT Interpretation Time: 2127  Decision to Treat Time for Alteplase: 2141(bolus given)    NIH Scale:  1a. Level of Consciousness: 0-->Alert, keenly responsive  1b. LOC Questions: 0-->Answers both questions correctly  1c. LOC Commands: 0-->Performs both tasks correctly  2. Best Gaze: 0-->Normal  3. Visual: 0-->No visual loss  4. Facial Palsy: 1-->Minor paralysis (flattened nasolabial fold, asymmetry on smiling)  5a. Motor Arm, Left: 0-->No drift, limb holds 90 (or 45) degrees for full 10 secs  5b. Motor Arm, Right: 0-->No drift, limb holds 90 (or 45) degrees for full 10 secs  6a. Motor Leg, Left: 0-->No drift, leg holds 30 degree position for full 5 secs  6b. Motor Leg, Right: 1-->Drift, leg falls by the end of the 5-sec period but does not hit bed  7. Limb Ataxia: 0-->Absent  8. Sensory: 0-->Normal, no sensory loss  9. Best Language: 0-->No aphasia, normal  10. Dysarthria: 0-->Normal  11. Extinction and Inattention (formerly Neglect): 0-->No abnormality  Total (NIH Stroke Scale): 2       Modified [place] Score: 1  [place] Coma Scale:    ABCD2 Score:    [id number] Score:   HAS -BLED Score:   ICH Score:   Hunt & Boss Classification:      Hemorrhagic change of an Ischemic Stroke: Does this patient have an ischemic stroke with hemorrhagic changes? No     Neurologic Chief Complaint: Left MCA stroke     Subjective:     Interval History: Patient is seen for follow-up neurological assessment and treatment recommendations:     No acute events overnight. Discharge today.     HPI, Past Medical, Family, and Social History remains the same as documented in the initial encounter.     Review of Systems   Constitutional: Negative for fever.   Respiratory: Negative for shortness of breath.    Cardiovascular: Negative for chest pain.   Gastrointestinal: Negative for  abdominal pain.   Neurological: Positive for facial asymmetry and weakness. Negative for seizures and headaches.   Psychiatric/Behavioral: Negative for agitation, behavioral problems and confusion.     Scheduled Meds:   amLODIPine  10 mg Oral Daily    aspirin  81 mg Oral Daily    atorvastatin  40 mg Oral Daily    clopidogreL  75 mg Oral Daily    escitalopram oxalate  5 mg Oral Daily    heparin (porcine)  5,000 Units Subcutaneous Q8H    senna-docusate 8.6-50 mg  1 tablet Oral BID     Continuous Infusions:  PRN Meds:acetaminophen, labetaloL, sodium chloride 0.9%    Objective:     Vital Signs (Most Recent):  Temp: 98.8 °F (37.1 °C) (10/12/20 1234)  Pulse: 94 (10/12/20 1234)  Resp: 16 (10/12/20 1234)  BP: (!) 167/94 (10/12/20 1234)  SpO2: 95 % (10/12/20 1234)  BP Location: Left arm    Vital Signs Range (Last 24H):  Temp:  [97.3 °F (36.3 °C)-98.8 °F (37.1 °C)]   Pulse:  []   Resp:  [16-30]   BP: (125-167)/(67-94)   SpO2:  [95 %-99 %]   BP Location: Left arm    Physical Exam  HENT:      Head: Normocephalic and atraumatic.   Eyes:      Extraocular Movements: Extraocular movements intact.      Pupils: Pupils are equal, round, and reactive to light.   Cardiovascular:      Rate and Rhythm: Rhythm irregular.   Pulmonary:      Effort: No respiratory distress.   Abdominal:      Palpations: Abdomen is soft.         Neurological Exam:   LOC: alert  Attention Span: Good   Language: No aphasia  Articulation: No dysarthria  Orientation: Person, Place, Time   Visual Fields: Full  EOM (CN III, IV, VI): Full/intact  Pupils (CN II, III): PERRL  Facial Sensation (CN V): Normal  Facial Movement (CN VII): Subtle Lower facial weakness on the Right  Motor: RLE drift; asymmetry on strength - proximal; distal right > left    Cebellar: No evidence of appendicular or axial ataxia  Sensation: Intact to touch   Tone: Normal tone throughout    Laboratory:  CMP:   Recent Labs   Lab 10/12/20  0504   CALCIUM 9.2   ALBUMIN 3.7   PROT 7.2       K 3.6   CO2 21*      BUN 18   CREATININE 0.7   ALKPHOS 116   ALT 25   AST 27   BILITOT 0.7     BMP:   Recent Labs   Lab 10/12/20  0504      K 3.6      CO2 21*   BUN 18   CREATININE 0.7   CALCIUM 9.2     CBC:   Recent Labs   Lab 10/12/20  0504   WBC 11.28   RBC 4.62   HGB 13.7   HCT 42.7      MCV 92   MCH 29.7   MCHC 32.1     Lipid Panel:   Recent Labs   Lab 10/10/20  0004   CHOL 138   LDLCALC 72.0   HDL 51   TRIG 75     Coagulation:   Recent Labs   Lab 10/09/20  2115   INR 1.1   APTT 30.9     Platelet Aggregation Study: No results for input(s): PLTAGG, PLTAGINTERP, PLTAGREGLACO, ADPPLTAGGREG in the last 168 hours.  Hgb A1C:   Recent Labs   Lab 10/10/20  0004   HGBA1C 5.4     TSH:   Recent Labs   Lab 10/10/20  0004   TSH 4.399*       Diagnostic Results     Brain Imaging   1. No evidence of acute ischemia or recent infarction.  2. Continued maturing infarcts in the left cerebral hemisphere relative to the 08/24/2020 MRI.  Persistent, though decreased degree of restricted diffusion is noted with multiple infarcts in the left centrum semiovale and corona radiata.  Areas of restricted diffusion previously seen in the left basal ganglia have resolved.    Persistent occlusion of the left internal carotid artery with collateral filling/reconstitution at the cavernous segment.     Relative diminutive appearance of the left MCA without high-grade stenosis or occlusion.  This represents interval change from 08/23/2020.     No intracranial large vessel arterial occlusion.    Cardiac Imaging   Echo:  · There is left ventricular concentric remodeling.  · The left ventricle is normal in size with low normal systolic function. The estimated ejection fraction is 50%.  · Normal right ventricular systolic function.  · Normal left ventricular diastolic function.  · Low central venous pressure.      Jose Moya NP  [facility] [[email or web address]] Stroke Center  Department of Vascular Neurology   [facility]

## 2020-10-12 NOTE — ASSESSMENT & PLAN NOTE
59 y/o female with recent Left MCA/RAMA borderzone infarcts; at the background of left ICA occlusion with transfer post t-PA for worsening residual or recurring RSW.      Current presentation; negative for acute stroke; not ADC correlate; likely hypoperfusion related events.   Reset / Optimize SBP goals - avoid hypotension; target 140-160s on long term basis      Antithrombotics: DAPT    Statins : lipitor 40 mg daily    Aggressive risk factor modification: HTN, HLD, Diet, Exercise, Obesity     Rehab efforts: The patient has been evaluated by a stroke team provider and the therapy needs have been fully considered based off the presenting complaints and exam findings. The following therapy evaluations are needed: PT evaluate and treat, OT evaluate and treat, SLP evaluate and treat - resume outpatient PT/OT    Diagnostics ordered/pending: None     VTE prophylaxis: Mechanical prophylaxis: Place SCDs; heparin 5000 units q8h     BP parameters: Infarct: Post tPA, SBP <180

## 2020-10-12 NOTE — ASSESSMENT & PLAN NOTE
59 y/o female with recent Left MCA/RAMA borderzone infarcts; at the background of left ICA occlusion with transfer post t-PA for worsening residual or recurring RSW.      Current presentation; negative for acute stroke; not ADC correlate; likely hypoperfusion related events.   Reset / Optimize SBP goals - avoid hypotension; target 140-160s on long term basis      Antithrombotics: DAPT    Statins : lipitor 40 mg daily    Aggressive risk factor modification: HTN, HLD, Diet, Exercise, Obesity     Rehab efforts: The patient has been evaluated by a stroke team provider and the therapy needs have been fully considered based off the presenting complaints and exam findings. The following therapy evaluations are needed: PT evaluate and treat, OT evaluate and treat, SLP evaluate and treat - resume outpatient PT/OT    BP parameters: Infarct: Post tPA, SBP <180

## 2020-10-12 NOTE — HOSPITAL COURSE
10/11: NAEON. No concerns for worsening NIHSS. OT/PT recs rehab   10/12: No acute events overnight. Discharge today.

## 2020-10-12 NOTE — PT/OT/SLP EVAL
Speech Language Pathology Evaluation  Cognitive Communication    Patient Name:  Rosalia Fagan   MRN:  [id number]  Admitting Diagnosis: Acute ischemic left MCA stroke    Recommendations:     Recommendations:                General Recommendations:  Speech/language therapy  Diet recommendations:  Regular, Thin   Aspiration Precautions: Standard aspiration precautions   General Precautions: Standard, aphasia, aspiration, fall  Communication strategies:  none    History:     Past Medical History:   Diagnosis Date    Adrenal adenoma, left 05/2017    Chronic anxiety     Chronic back pain     Closed L3 vertebral fracture 12/2017    fell 2 weeks after back surgery     COPD (chronic obstructive pulmonary disease)     Degenerative joint disease (DJD) of lumbar spine     Diverticulosis of colon     Elevated liver enzymes     Encounter for blood transfusion     Fibroids, intramural 02/2016    GERD (gastroesophageal reflux disease)     History of shingles 02/2016    Right flank    Hyperlipemia     Hypertension     Ischemic colitis 10/2017    Lumbar post-laminectomy syndrome     Menopausal and female climacteric states     Muscle contraction headache syndrome     Pancreatitis 04/2017    PTSD (post-traumatic stress disorder)     daughter age 7 killed in the driveway hit by a drunk     Spinal stenosis of lumbar region at multiple levels     Varicose veins of both lower extremities with inflammation     Vitamin D deficiency        Past Surgical History:   Procedure Laterality Date    APPENDECTOMY  1992    BREAST BIOPSY Right 1992    [place] [facility]    [place]    [facility] [place]    COLONOSCOPY N/A 10/10/2017    Procedure: COLONOSCOPY;  Surgeon: Mike Narvaez MD;  Location: [facility];  Service: Endoscopy;  Laterality: N/A;    COLONOSCOPY  10/05/2018    CORONARY ANGIOPLASTY  03/31/2016    EPIDURAL STEROID INJECTION INTO LUMBAR SPINE  11/2014    [facility]    ESOPHAGOGASTRODUODENOSCOPY N/A  "10/5/2018    Procedure: EGD (ESOPHAGOGASTRODUODENOSCOPY);  Surgeon: Mike Narvaez MD;  [facility];  Service: Endoscopy;  Laterality: N/A;    LUMBAR LAMINECTOMY WITH FUSION  11/2017    TOTAL ABDOMINAL HYSTERECTOMY W/ BILATERAL SALPINGOOPHORECTOMY  02/2016       Social History: Patient lives alone.    Prior diet: reg/thin    Occupation/hobbies/homemaking: works as a       Subjective     Awake/alert  Pain/Comfort:  Pain Rating 1: 0/10    Objective:   Cognitive Status:    Orientation Oriented x4  Problem Solving Categories 100% min cues, Sequencing 100% min cues, Solutions 100% IND and Compare/contrast 50% mod cues      Receptive Language:   Comprehension:   Questions Complex yes/no 100% IND  Commands  complex/abstract commands 70% min cues    Pragmatics:    WFL    Expressive Language:  Verbal:    Naming Single word responsive naming 100% IND   Word finding deficits noted during conversational speech  Nonverbal:   WFL      Motor Speech:  WFL with no evidence of dysarthria or apraxia    Voice:   [facility]    Reading:   [facility]     Written Expression:   [facility]    Pt with noted word finding difficulty and filler phrases ("ya know") during conversational speech. Prior to admission, pt participating in outpatient ST, OT, and PT services from previous CVA in August. Pt reports no change in cognition since this admission.   Pt tolerated PO trials of regular solid x2 and thin liquid via open cup x1 and straw x4 with no overt clinical signs of airway compromise. SLP recommends pt continue regular solids and thin liquid diet at this time. SLP to follow up.     Assessment:   Rosalia Fagan is a 60 y.o. female with an SLP diagnosis of Aphasia.     Goals:   Multidisciplinary Problems     SLP Goals        Problem: SLP Goal    Goal Priority Disciplines Outcome   SLP Goal     SLP Ongoing, Progressing   Description: Speech Language Pathology Goals  Goals expected to be met by 10/17:    1. Pt will tolerate regular diet " with thin liquids following safe swallow precautions.   2. Pt will independently follow complex commands with 100% accuracy.   3. Pt will increase word finding abilities during conversational speech.                     Plan:   · Patient to be seen:  4 x/week   · Plan of Care expires:  11/08/20  · Plan of Care reviewed with:  patient   · SLP Follow-Up:  Yes       Discharge recommendations:  Discharge Facility/Level of Care Needs: (tba)   Barriers to Discharge:  None    Time Tracking:   SLP Treatment Date:   10/12/20  Speech Start Time:  0832  Speech Stop Time:  0853     Speech Total Time (min):  21 min    Billable Minutes: Eval 12  and Treatment Swallowing Dysfunction 9    ANABEL Gibson  10/12/2020

## 2020-10-12 NOTE — PLAN OF CARE
Problem: Adult Inpatient Plan of Care  Goal: Plan of Care Review  Outcome: Ongoing, Progressing  Flowsheets (Taken 10/12/2020 0349)  Plan of Care Reviewed With: patient     Problem: Fall Injury Risk  Goal: Absence of Fall and Fall-Related Injury  Outcome: Ongoing, Progressing     Problem: Adjustment to Illness (Stroke, Ischemic/Transient Ischemic Attack)  Goal: Optimal Coping  Outcome: Ongoing, Progressing     Problem: Mobility Impairment  Goal: Optimal Mobility  Outcome: Ongoing, Progressing   POC reviewed with patient, verbalized understanding. Pt aaox4, VSS. Denied any pain or discomfort at this time. No acute events overnight. Fall precautions maintained; bed locked and in lowest position. Bed alarm on and patient instructed to call staff for assistance; will continue to monitor.

## 2020-10-12 NOTE — PLAN OF CARE
Patient medically ready for discharge to home no needs with outpatient therapy. Any necessary transport setup by . This CM scheduled or requested necessary follow-up appointments. Family/patient aware of discharge.    Future Appointments   Date Time Provider Department Center   10/14/2020  9:30 AM Jeanette Cloud NP [facility]   10/15/2020  9:00 AM Sakshi Ortega CCC-SLP [facility]   10/15/2020  9:45 AM Binta Loredo PT [facility]   10/15/2020 10:30 AM SANTIAGO Oconnell [facility]   10/21/2020  1:20 PM Barbara Hameed NP [facility]  [facility]        10/12/20 1153   Final Note   Assessment Type Final Discharge Note   Anticipated Discharge Disposition Home   Hospital Follow Up  Appt(s) scheduled? No  (PCP clinic did not answer)   Discharge plans and expectations educations in teach back method with documentation complete? Yes   Right Care Referral Info   Post Acute Recommendation No Care   Post-Acute Status   Post-Acute Authorization Other   Other Status No Post-Acute Service Needs   Discharge Delays None known at this time       Uma Mancuso RN  Case Management  Ext: [phone]  10/12/2020

## 2020-10-12 NOTE — MEDICAL/APP STUDENT
Progress Note  [facility] Medicine      Admit Date: 10/9/2020 ( LOS: 2 days )    SUBJECTIVE:     Follow-up For:  Acute ischemic left MCA stroke    HPI/Interval history (See H&P for complete P,F,SHx):  Ms. Fagan is a 60 year old female with past medical history of CVA (08/2020), hypertension, hyperlipidemia, and 45 pack years who from [facility] for RSW and right facial droop and expressive aphasia. She was initiated on tPA after consulting [facility] vascular neuroloy. CTH - no acute abnormalities, CTA - abundant atherosclerotic plaque with 100% stenosis of the left ICA without LVO, and MRI - maturing infartcs in the left cerebral hemisphere relative to August MRI without recent acute ischemia or infarction.     Patient had a prior stroke back in August, but was not initiated on tPA or LAURA. Patient states she had recovered from this stroke.     Interval of History:  10/12/2020:    No acute events overnight. No complains or pain. Patient appears moderately anxious and distressed. Expressed desire to be discharged home. Otherwise, patient continuing to improve in terms of symptoms - mild facial asymmetry and right extremity weakness.    Review of Systems   Constitutional: Negative.    HENT: Negative.    Eyes: Negative.    Respiratory: Negative.    Cardiovascular: Negative.    Gastrointestinal: Negative.    Genitourinary: Negative.    Musculoskeletal: Negative.    Skin: Negative.    Neurological: Negative.    Endo/Heme/Allergies: Negative.    Psychiatric/Behavioral: Negative.        OBJECTIVE:     Vital Signs (Most Recent)  Temp: 98.1 °F (36.7 °C) (10/12/20 0446)  Pulse: 88 (10/12/20 0446)  Resp: 18 (10/12/20 0446)  BP: (!) 150/71 (10/12/20 0446)  SpO2: 96 % (10/12/20 0446)    Vital Signs Range (Last 24H):  Temp:  [97.3 °F (36.3 °C)-98.3 °F (36.8 °C)]   Pulse:  []   Resp:  [15-30]   BP: (125-160)/(64-92)   SpO2:  [94 %-99 %]     I & O (Last 24H):  No intake or output data in the 24 hours ending 10/12/20  "0823    Estimated body mass index is 35.29 kg/m² as calculated from the following:    Height as of this encounter: 5' 7" (1.702 m).    Weight as of this encounter: 102.2 kg (225 lb 5 oz).    Physical Exam   Constitutional: She is oriented to person, place, and time. She appears distressed.   Eyes: Pupils are equal, round, and reactive to light. Conjunctivae are normal.   Neck: Normal range of motion.   Cardiovascular: Normal rate and normal heart sounds.   Pulmonary/Chest: Effort normal.   Abdominal: Soft. Bowel sounds are normal.   Musculoskeletal: Normal range of motion.   Neurological: She is alert and oriented to person, place, and time. She displays weakness (right lower extremity) and facial asymmetry. She displays normal speech. She shows no pronator drift.   Skin: Skin is warm.   Psychiatric: Memory, affect and judgment normal. Her mood appears anxious.     NIH Stroke Scale:    Level of Consciousness: 0 - alert  LOC Questions: 0 - answers both correctly    Best Gaze: 0 - normal  Visual: 0 - no visual loss  Facial Palsy: 1 - minor  Motor Left Arm: 0 - no drift  Motor Right Arm: 0 - no drift  Motor Left Le - no drift  Motor Right Le - drift  Limb Ataxia: 0 - absent  Sensory: 0 - normal  Best Language: 0 - no aphasia  Dysarthria: 0 - normal articulation  Extinction and Inattention: 0 - no neglect  NIH Stroke Scale Total: 2      Laboratory Data:  Recent Labs   Lab 10/12/20  0630   POCTGLUCOSE 96       Lab Results   Component Value Date    WBC 11.28 10/12/2020    HGB 13.7 10/12/2020    HCT 42.7 10/12/2020    MCV 92 10/12/2020     10/12/2020     CMP  Sodium   Date Value Ref Range Status   10/12/2020 137 136 - 145 mmol/L Final     Potassium   Date Value Ref Range Status   10/12/2020 3.6 3.5 - 5.1 mmol/L Final     Chloride   Date Value Ref Range Status   10/12/2020 104 95 - 110 mmol/L Final     CO2   Date Value Ref Range Status   10/12/2020 21 (L) 23 - 29 mmol/L Final     Glucose   Date Value Ref " Range Status   10/12/2020 97 70 - 110 mg/dL Final     BUN, Bld   Date Value Ref Range Status   10/12/2020 18 6 - 20 mg/dL Final     Creatinine   Date Value Ref Range Status   10/12/2020 0.7 0.5 - 1.4 mg/dL Final     Calcium   Date Value Ref Range Status   10/12/2020 9.2 8.7 - 10.5 mg/dL Final     Total Protein   Date Value Ref Range Status   10/12/2020 7.2 6.0 - 8.4 g/dL Final     Albumin   Date Value Ref Range Status   10/12/2020 3.7 3.5 - 5.2 g/dL Final     Total Bilirubin   Date Value Ref Range Status   10/12/2020 0.7 0.1 - 1.0 mg/dL Final     Comment:     For infants and newborns, interpretation of results should be based  on gestational age, weight and in agreement with clinical  observations.  Premature Infant recommended reference ranges:  Up to 24 hours.............<8.0 mg/dL  Up to 48 hours............<12.0 mg/dL  3-5 days..................<15.0 mg/dL  6-29 days.................<15.0 mg/dL       Alkaline Phosphatase   Date Value Ref Range Status   10/12/2020 116 55 - 135 U/L Final     AST   Date Value Ref Range Status   10/12/2020 27 10 - 40 U/L Final     ALT   Date Value Ref Range Status   10/12/2020 25 10 - 44 U/L Final     Anion Gap   Date Value Ref Range Status   10/12/2020 12 8 - 16 mmol/L Final     eGFR if    Date Value Ref Range Status   10/12/2020 >60.0 >60 mL/min/1.73 m^2 Final     eGFR if non    Date Value Ref Range Status   10/12/2020 >60.0 >60 mL/min/1.73 m^2 Final     Comment:     Calculation used to obtain the estimated glomerular filtration  rate (eGFR) is the CKD-EPI equation.          Medications:  Medication list was reviewed and changes noted under Assessment/Plan.    Diagnostic Results:  CTH:   Left frontoparietal region chronic microvascular disease. No acute intracranial hemorrhage, mass or CT evidence of an acute infarct.    MRI WO CONTRAST:  Impression:     1. No evidence of acute ischemia or recent infarction.  2. Continued maturing infarcts in the  left cerebral hemisphere relative to the 08/24/2020 MRI.  Persistent, though decreased degree of restricted diffusion is noted with multiple infarcts in the left centrum semiovale and corona radiata.  Areas of restricted diffusion previously seen in the left basal ganglia have resolved.    CTA:    CT head:     Patchy areas of hypoattenuation in the left corona radiata/centrum semiovale consistent with patient's known prior infarct, with a distribution suggesting watershed infarction.  No hemorrhagic transformation.  No new acute major vascular distribution infarct.     CTA head and neck:     Persistent occlusion of the left internal carotid artery with collateral filling/reconstitution at the cavernous segment.     Relative diminutive appearance of the left MCA without high-grade stenosis or occlusion.  This represents interval change from 08/23/2020.     No intracranial large vessel arterial occlusion.  ASSESSMENT/PLAN:     Active Hospital Problems    Diagnosis  POA    *Acute ischemic left MCA stroke [I63.512]  Yes    Cytotoxic brain edema [G93.6]  Yes    S/P admn tPA in diff fac w/n last 24 hr bef adm to crnt fac [Z92.82]  Not Applicable    Impaired mobility and ADLs [Z74.09, Z78.9]  Yes    Impaired instrumental activities of daily living (IADL) [Z78.9]  Yes    Carotid occlusion, left [I65.22]  Yes    Hypertension [I10]  Yes    Hyperlipemia [E78.5]  Yes    COPD (chronic obstructive pulmonary disease) [J44.9]  Yes      Resolved Hospital Problems   No resolved problems to display.     Ms. Fagan is a 60F with past medical history of recent CVA, hypertension, hyperlipidemia, and 45 pack year history who is admitted for evaluation and management of a left MCA/RAMA borderzone infarcts in the setting of a left ICA occlusion.    LMCA/RAMA Stroke:  Improvement with tPA (10/09 @2141)  Continue ASA 81  Continue Lipitor 40  Continue Plavix 75 (21 days)  Aggressive stroke RF modification (smoking, hypertension, ICA  atherosclerosis, hyperlipidemia, BMI)  Out-patient rehabilitation with PT/OT/SLP  ECHO - EF 50%, normal LV systolic/diastolic function, LV concentric remodeling    Left ICA Occlusion:  Vascular consult    Hypertension:  10/12/2020 - 150/71  Maintain -60 for adequate perfusion (left ICA occluded)  ASA and statin    Hyperlipidemia:  Lipitor 40    Myra Robertson, MS III

## 2020-10-12 NOTE — DISCHARGE SUMMARY
[facility]  Vascular Neurology  Comprehensive Stroke Center  Discharge Summary     Summary:     Admit Date: 10/9/2020 11:28 PM    Discharge Date and Time: 10/12/2020    Attending Physician: Kang Cesar MD     Discharge Provider: Jose Moya NP    History of Present Illness: 61 y/o female PMH HTN, HLD, recent CVA. Presents with acute onset of right sided weakness, facial droop. Patient had recent stroke in August with L ICA occlusion and back to baseline with no residual deficits. She was taken to [facility] and seen in telemedicine by Dr Merritt and with no acute process seen on CT scan and no contraindications recommendation was to give IV TPA and transfer to [facility] for further evaluation.     Upon arrival CTA head and neck multiphase done and no LVO seen so no IR. Patient is conversant has facial droop, slight weakness on right and sensory loss. MRI negative for acute stroke; not ADC correlate; likely hypoperfusion related events. Avoid hypoperfusion; target -160s on long term basis. Continue DAPT, statin. Therapy recommends to continue outpatient PT/OT.         Hospital Course (synopsis of major diagnoses, care, treatment, and services provided during the course of the hospital stay): 10/11: NAEON. No concerns for worsening NIHSS. OT/PT recs rehab   10/12: No acute events overnight. Discharge today.     Stroke Etiology: Not Applicable/Not Ischemic Stroke    STROKE DOCUMENTATION   Acute Stroke Times   Last Known Normal Date: 10/09/20  Last Known Normal Time: 2000  Symptom Onset Date: 10/09/20  Symptom Onset Time: 2040  Stroke Team Called Date: 10/09/20  Stroke Team Called Time: 2117  Stroke Team Arrival Date: 10/09/20  Stroke Team Arrival Time: 2330  CT Interpretation Time: 2127  Decision to Treat Time for Alteplase: 2141(bolus given)     NIH Scale:  1a. Level of Consciousness: 0-->Alert, keenly responsive  1b. LOC Questions: 0-->Answers both  questions correctly  1c. LOC Commands: 0-->Performs both tasks correctly  2. Best Gaze: 0-->Normal  3. Visual: 0-->No visual loss  4. Facial Palsy: 1-->Minor paralysis (flattened nasolabial fold, asymmetry on smiling)  5a. Motor Arm, Left: 0-->No drift, limb holds 90 (or 45) degrees for full 10 secs  5b. Motor Arm, Right: 0-->No drift, limb holds 90 (or 45) degrees for full 10 secs  6a. Motor Leg, Left: 0-->No drift, leg holds 30 degree position for full 5 secs  6b. Motor Leg, Right: 1-->Drift, leg falls by the end of the 5-sec period but does not hit bed  7. Limb Ataxia: 0-->Absent  8. Sensory: 0-->Normal, no sensory loss  9. Best Language: 0-->No aphasia, normal  10. Dysarthria: 0-->Normal  11. Extinction and Inattention (formerly Neglect): 0-->No abnormality  Total (NIH Stroke Scale): 2        Modified Shekhar Score: 1  Welcome Coma Scale:    ABCD2 Score:    [id number] Score:   HAS -BLED Score:   ICH Score:   Hunt & Boss Classification:       Assessment/Plan:     Diagnostic Results:       Brain Imaging   1. No evidence of acute ischemia or recent infarction.  2. Continued maturing infarcts in the left cerebral hemisphere relative to the 08/24/2020 MRI.  Persistent, though decreased degree of restricted diffusion is noted with multiple infarcts in the left centrum semiovale and corona radiata.  Areas of restricted diffusion previously seen in the left basal ganglia have resolved.     Persistent occlusion of the left internal carotid artery with collateral filling/reconstitution at the cavernous segment.     Relative diminutive appearance of the left MCA without high-grade stenosis or occlusion.  This represents interval change from 08/23/2020.     No intracranial large vessel arterial occlusion.     Cardiac Imaging   Echo:  · There is left ventricular concentric remodeling.  · The left ventricle is normal in size with low normal systolic function. The estimated ejection fraction is 50%.  · Normal right  ventricular systolic function.  · Normal left ventricular diastolic function.  · Low central venous pressure.       Interventions: IV t-PA    Complications: None    Disposition: Home or Self Care    Final Active Diagnoses:    Diagnosis Date Noted POA    PRINCIPAL PROBLEM:  H/O ischemic left MCA stroke [Z86.73] 08/27/2020 Not Applicable    Cytotoxic brain edema [G93.6] 10/10/2020 Yes    S/P admn tPA in diff fac w/n last 24 hr bef adm to crnt fac [Z92.82] 10/10/2020 Not Applicable    Impaired mobility and ADLs [Z74.09, Z78.9] 09/10/2020 Yes    Impaired instrumental activities of daily living (IADL) [Z78.9] 09/10/2020 Yes    Carotid occlusion, left [I65.22] 08/25/2020 Yes    Hypertension [I10]  Yes    Hyperlipemia [E78.5]  Yes    COPD (chronic obstructive pulmonary disease) [J44.9]  Yes      Problems Resolved During this Admission:     * H/O ischemic left MCA stroke  59 y/o female with recent Left MCA/RAMA borderzone infarcts; at the background of left ICA occlusion with transfer post t-PA for worsening residual or recurring RSW.      Current presentation; negative for acute stroke; not ADC correlate; likely hypoperfusion related events.   Reset / Optimize SBP goals - avoid hypotension; target 140-160s on long term basis      Antithrombotics: DAPT    Statins : lipitor 40 mg daily    Aggressive risk factor modification: HTN, HLD, Diet, Exercise, Obesity     Rehab efforts: The patient has been evaluated by a stroke team provider and the therapy needs have been fully considered based off the presenting complaints and exam findings. The following therapy evaluations are needed: PT evaluate and treat, OT evaluate and treat, SLP evaluate and treat - resume outpatient PT/OT    BP parameters: Infarct: Post tPA, SBP <180        COPD (chronic obstructive pulmonary disease)  - Prn duonebs       Hypertension  Stroke risk factor  Recent Left MCA/RAMA borderzone infarcts; at the background of left ICA occlusion     Current  presentation; negative for acute stroke; likely hypoperfusion related   Reset / Optimize SBP goals - avoid hypotension; target 140-160s on long term basis            Recommendations:     Post-discharge complication risks: Falls    Stroke Education given to: patient    Follow-up in Stroke Clinic in 4-6 weeks.     Discharge Plan:  Antithrombotics: Aspirin 81 mg, Clopidogrel 75mg  Statin: Atorvastatin 40 mg  Aggresive risk factor modification:  Hypertension  High Cholesterol  Diet  Exercise  Obesity    Follow Up:  Follow-up Information     Barbara Hameed NP.    Specialty: Internal Medicine  Why: Follow up with PCP within 7-10 days of discharge   Contact information:  [address]  [address]  [place] [address]  [phone]             [facility]ULAR [facility]UROLOG[facility] In 1 month.    Specialty: Vascular Neurology  Why: Someone will call to schedule stroke follow up in 4-6 weeks. If you do not receive a call from someone within 1 week please call number listed.   Contact information:  [address]  [facility] [address]  [phone]                 Patient Instructions:      Ambulatory referral/consult to Vascular Neurology   Standing Status: Future   Referral Priority: Routine Referral Type: Consultation   Referral Reason: Specialty Services Required   Requested Specialty: Vascular Neurology   Number of Visits Requested: 1     Ambulatory referral/consult to Physical/Occupational Therapy   Standing Status: Future   Referral Priority: Routine Referral Type: Physical Medicine   Referral Reason: Specialty Services Required   Number of Visits Requested: 1     Diet Cardiac   Order Comments: See Stroke Patient Education Guide Booklet for details.     Call 911 for any of the following:   Order Comments: Call 911  right away if any of the following warning signs come on suddenly, even if the symptoms only last for a few minutes. With stroke, timing is very important.   - Warning Signs of Stroke:  - Weakness: You may feel  a sudden weakness, tingling or loss of feeling on one side of your face or body.  - Vision Problems: You may have sudden double vision or trouble seeing in one or both eyes.  - Speech Problems: You may have sudden trouble talking, slured speech, or problems understanding others.  - Headache: You may have sudden, severe headache.  - Movement Problems: You may experience dizziness, a feeling of spinning, a loss of balance, a feeling of falling or blackouts.     Activity as tolerated       Medications:  Reconciled Home Medications:      Medication List      CONTINUE taking these medications    ALPRAZolam 0.5 MG tablet  Commonly known as: XANAX  Take 1 tablet (0.5 mg total) by mouth 3 (three) times daily as needed for Anxiety.     amLODIPine 10 MG tablet  Commonly known as: NORVASC  Take 1 tablet (10 mg total) by mouth once daily.     aspirin 81 MG EC tablet  Commonly known as: ECOTRIN  Take 1 tablet (81 mg total) by mouth once daily.     atorvastatin 40 MG tablet  Commonly known as: LIPITOR  Take 1 tablet (40 mg total) by mouth once daily.     benazepriL 20 MG tablet  Commonly known as: LOTENSIN  Take 1 tablet (20 mg total) by mouth once daily.     clopidogreL 75 mg tablet  Commonly known as: PLAVIX  Take 1 tablet (75 mg total) by mouth once daily.     cyclobenzaprine 10 MG tablet  Commonly known as: FLEXERIL  TAKE 1 TABLET BY MOUTH THREE TIMES A DAY AS NEEDED FOR MUSCLE SPASMS     escitalopram oxalate 5 MG Tab  Commonly known as: LEXAPRO  Take 1 tablet (5 mg total) by mouth once daily.     multivitamin capsule  Take 1 capsule by mouth once daily.     spironolactone 25 MG tablet  Commonly known as: ALDACTONE  Take 1 tablet (25 mg total) by mouth once daily.     triamcinolone acetonide 0.5% 0.5 % Crea  Commonly known as: KENALOG  Apply 1 application topically 2 (two) times daily.     VITAMIN B-12 1000 MCG tablet  Generic drug: cyanocobalamin  Take 1,000 mcg by mouth once daily.            Jose Moya,  NP  Comprehensive Stroke Center  Department of Vascular Neurology   [facility]

## 2020-10-12 NOTE — PT/OT/SLP PROGRESS
Physical Therapy Treatment    Patient Name:  Rosalia Fagan   MRN:  [id number]    Recommendations:     Discharge Recommendations:  outpatient PT   Discharge Equipment Recommendations: none   Barriers to discharge: None    Assessment:     Rosalia Fagan is a 60 y.o. female admitted with a medical diagnosis of H/O ischemic left MCA stroke.  She presents with the following impairments/functional limitations:  weakness, impaired endurance, impaired self care skills, impaired functional mobilty, gait instability, impaired balance. Pt ambulated ~300 ft with Supervision and RW. Pt at previous baseline prior to most recent admit and appropriate for continued therapy in the OP setting. PT recommending OP therapy services upon DC from hospital.    Rehab Prognosis: Good; patient would benefit from acute skilled PT services to address these deficits and reach maximum level of function.    Recent Surgery: * No surgery found *      Plan:     During this hospitalization, patient to be seen 3 x/week to address the identified rehab impairments via gait training, therapeutic activities, therapeutic exercises, neuromuscular re-education and progress toward the following goals:    · Plan of Care Expires:  11/08/20    Subjective     Chief Complaint: none verbalized  Patient/Family Comments/goals: Pt eager to go home  Pain/Comfort:  Pain Rating 1: 0/10  Pain Rating Post-Intervention 1: 0/10      Objective:     Communicated with RN prior to session.  Patient found HOB elevated with telemetry, peripheral IV, pulse ox (continuous) upon PT entry to room.     General Precautions: Standard, aphasia, aspiration, fall   Orthopedic Precautions:N/A   Braces: N/A     Functional Mobility:  · Bed Mobility:     · Scooting: modified independence  · Supine to Sit: modified independence  · Sit to Supine: modified independence  · Transfers:     · Sit to Stand from bed:  supervision with rolling walker  · Sit to Stand from low toilet: SBA with RW and  grab bar  · Gait: ~300 ft, supervision, RW, no LOBs or unsteadiness noted  · Balance:   · Static Sitting: Independent  · Dynamic Sitting: Supervision  · Static Standing: Supervision with RW  · Dynamic Standing: Supervision for ambulation with RW      AM-PAC 6 CLICK MOBILITY  Turning over in bed (including adjusting bedclothes, sheets and blankets)?: 3  Sitting down on and standing up from a chair with arms (e.g., wheelchair, bedside commode, etc.): 3  Moving from lying on back to sitting on the side of the bed?: 3  Moving to and from a bed to a chair (including a wheelchair)?: 3  Need to walk in hospital room?: 3  Climbing 3-5 steps with a railing?: 2  Basic Mobility Total Score: 17       Therapeutic Activities and Exercises:   Patient educated on role of therapy, goals of session, and benefits of mobilizing.   Discussed PT plan of care during hospitalization.   Patient educated on calling for assistance.   Patient educated on how their diagnosis impacts their mobility within PT scope of practice.   Communication board up to date.  All questions answered within PT scope of practice. Pt denies any concerns with DC home.    Pt safe for mobilizing in room with RW and 1 person nursing staff assistance. Please assist OOB to chair for all meals.      Patient left HOB elevated with all lines intact, call button in reach and bed alarm on.    GOALS:   Multidisciplinary Problems     Physical Therapy Goals        Problem: Physical Therapy Goal    Goal Priority Disciplines Outcome Goal Variances Interventions   Physical Therapy Goal     PT, PT/OT Ongoing, Progressing     Description: Goals to be met by: 10/23/20     Patient will increase functional independence with mobility by performin. Supine to sit with Contact Guard Assistance. - goal met 10/12/2020   Updated: independent2. Sit to supine with Contact Guard Assistance. - goal met 10/12/2020   Updated: Independent3. Sit to stand transfer with Contact Guard Assistance. -  goal met 10/12/2020   Updated: Independent4. Bed to chair transfer with Contact Guard Assistance using Rolling Walker PRN.5. Gait  x 150 feet with Contact Guard Assistance using Rolling Walker PRN. - goal met 10/12/2020   Updated: Independent6. Ascend/descend 4 stair with bilateral Handrails Contact Guard Assistance. 7. Lower extremity exercise program x 20 reps per handout, with assistance as needed.                     Time Tracking:     PT Received On: 10/12/20  PT Start Time: 0956     PT Stop Time: 1010  PT Total Time (min): 14 min     Billable Minutes: Gait Training 10    Treatment Type: Treatment  PT/PTA: PT     PTA Visit Number: 0     Aydee Martell, PT  10/12/2020

## 2020-10-12 NOTE — PLAN OF CARE
CM to bedside - pt provided assessment info. Pt w/ DME in place, normally lives alone but pt's son Darwin is currently in town from [place], [place]  and staying w/ pt since her last hospitalization and rehab stay. Pt w/ recs for rehab but wants to d/c to home and resume her outpt therapy at [facility].    CM provided patient anticipated ANN-MARIE which was written on the whiteboard and will be update by nursing staff.   Patient provided a Discharge Planning booklet. Patient verbalized understanding.    JORGE A ALEXANDRE [id number] - assisting 9th floor JORGE A Eaton [id number]     10/12/20 1763   Discharge Assessment   Assessment Type Discharge Planning Assessment   Confirmed/corrected address and phone number on facesheet? Yes   Assessment information obtained from? Patient;Medical Record   Expected Length of Stay (days) 3   Communicated expected length of stay with patient/caregiver yes   Prior to hospitilization cognitive status: Alert/Oriented   Prior to hospitalization functional status: Assistive Equipment;Needs Assistance   Current cognitive status: Alert/Oriented   Current Functional Status: Assistive Equipment;Needs Assistance   Facility Arrived From: N/A   Lives With child(ivette), adult   Able to Return to Prior Arrangements yes   Is patient able to care for self after discharge? Unable to determine at this time (comments)   Who are your caregiver(s) and their phone number(s)? rashad Granados; [facility] Daljit [phone]   Patient's perception of discharge disposition home or selfcare   Readmission Within the Last 30 Days no previous admission in last 30 days   Patient currently being followed by outpatient case management? No   Patient currently receives any other outside agency services? No   Equipment Currently Used at Home shower chair;bedside commode;rollator;walker, rolling   Do you have any problems affording any of your prescribed medications? No   Is the patient taking medications as prescribed? yes   Does  the patient have transportation home? Yes  (pt's son Darwin will pickup pt at d/c)   Transportation Anticipated family or friend will provide   Dialysis Name and Scheduled days N/A   Does the patient receive services at the [facility]? No   Discharge Plan A Home with family   Discharge Plan B Home with family;Home Health   DME Needed Upon Discharge  other (see comments)  (TBD)   Patient/Family in Agreement with Plan yes

## 2020-10-13 ENCOUNTER — PATIENT OUTREACH (OUTPATIENT)
Dept: ADMINISTRATIVE | Facility: CLINIC | Age: 60
End: 2020-10-13

## 2020-10-13 NOTE — NURSING
The patient was discharged to home ~1600. IV access was discontinued and instructions were reviewed. The patient's vitals and assessment were stable for discharge.

## 2020-10-13 NOTE — PATIENT INSTRUCTIONS
Stroke (Completed)    You have had a mild stroke, or cerebrovascular accident (CVA). This is caused by a loss of blood flow to part of your brain. This can occur when a blood clot forms inside the carotid artery (main artery from the heart to the brain) or inside the heart. When the clot travels to the brain, it can lodge in a blood vessel and block blood flow. The other common cause of stroke is a gradual narrowing of the arteries in the brain due to buildup of fatty deposits (plaque).  Symptoms  Blocked blood flow in different areas of the brain can cause different symptoms. If you have had a stroke before, a new one may be different. A memory aid for the basic signs of a stroke is F.A.S.T.  F.A.S.T.  · F: Face drooping, or numbness on one side. This may be more noticeable when you ask the affected person to smile.  · A: Arm weakness or numbness. The affected person may have trouble using or lifting one side.  · S: Speech difficulty. Speech may be slurred or hard to understand. The affected person may also use the wrong words.  · T: Time to call 911. Time is critical in treating a stroke. Call 911 as soon as you suspect a stroke has happened--even a small one. The sooner treatment is started the better, even if the symptoms go away.  Other common symptoms of a stroke include:  · Having difficulty getting the right words to come out  · Weakness in one leg  · Numbness on one side  · Difficulty walking  · Trouble with coordination  · Trouble with vision  · Headache  · Confusion  · Dizziness  Treatment  After you have had a stroke, you are at risk of having another. Be sure to follow up with your healthcare provider for further evaluation and treatment. If problems are found, your healthcare provider will recommend treatment with medicines and/or procedures.  To reduce your chance of having another stroke, you may be prescribed medicines. These include medicines to prevent blood clots, such as antiplatelet or  anticoagulant medicines.  Home care  · Rest at home and avoid exertion for the next few days.  · If your healthcare provider has prescribed medicines, take them as directed.  Follow-up care  Follow up with your healthcare provider, or as advised. Additional tests may be needed. If you had an X-ray, CT scan, MRI, or ECG (electrocardiogram), it will be reviewed by a specialist. You will be notified of any new findings that will affect your care.  Call 911  Contact emergency services right away if any of these occur:  · Any of your stroke symptoms worsen  · New problems with speech, confusion, vision, walking, coordination, facial droop, or weakness or numbness on one side of your body  · Severe headache, fainting spell, dizziness, or seizure  · Chest pain or shortness of breath  Remember F.A.S.T. (described above). If you notice warning signs and symptoms of stroke, CALL 911 without delay.  Date Last Reviewed: 9/21/2015  © 5812-0148 [employer]. [address] [place] [address], [place] [address]. All rights reserved. This information is not intended as a substitute for professional medical care. Always follow your healthcare professional's instructions.

## 2020-10-14 ENCOUNTER — TELEPHONE (OUTPATIENT)
Dept: NEUROLOGY | Facility: CLINIC | Age: 60
End: 2020-10-14

## 2020-10-15 LAB
BACTERIA BLD CULT: NORMAL
BACTERIA BLD CULT: NORMAL

## 2020-11-18 PROBLEM — Z92.82 S/P ADMN TPA IN DIFF FAC W/N LAST 24 HR BEF ADM TO CRNT FAC: Status: RESOLVED | Noted: 2020-10-10 | Resolved: 2020-11-18

## 2020-12-07 ENCOUNTER — TELEPHONE (OUTPATIENT)
Dept: NEUROLOGY | Facility: CLINIC | Age: 60
End: 2020-12-07

## 2020-12-07 ENCOUNTER — TELEPHONE (OUTPATIENT)
Dept: NEUROLOGY | Facility: CLINIC | Age: 60
End: 2020-12-07
Payer: MEDICAID

## 2020-12-07 NOTE — TELEPHONE ENCOUNTER
----- Message from Sarah Pulido sent at 2020  1:48 PM CST -----   Pt called very upset and crying stating her brother  Saturday and she can't accept it and not sure yet why. she want to know if she could reschedule her appt fron 12//10  to  12/15/20.  call back # [phone]

## 2020-12-07 NOTE — TELEPHONE ENCOUNTER
----- Message from Sarah Pulido sent at 2020 12:36 PM CST -----  SIVAN FOREMAN calling regarding Appointment Access  (message) for a reschedule of pt appt.  she stated her brother  and need to move appt from 12/10/20 to 20 or next Tuesday, 12/15/20.  [phone]

## 2020-12-29 ENCOUNTER — OFFICE VISIT (OUTPATIENT)
Dept: NEUROLOGY | Facility: CLINIC | Age: 60
End: 2020-12-29
Payer: MEDICAID

## 2020-12-29 VITALS
BODY MASS INDEX: 36.26 KG/M2 | HEART RATE: 89 BPM | SYSTOLIC BLOOD PRESSURE: 134 MMHG | WEIGHT: 231 LBS | DIASTOLIC BLOOD PRESSURE: 86 MMHG | HEIGHT: 67 IN

## 2020-12-29 DIAGNOSIS — R47.89 WORD FINDING DIFFICULTY: ICD-10-CM

## 2020-12-29 DIAGNOSIS — Z78.9 IMPAIRED MOBILITY AND ADLS: ICD-10-CM

## 2020-12-29 DIAGNOSIS — I63.512 ACUTE ISCHEMIC LEFT MCA STROKE: ICD-10-CM

## 2020-12-29 DIAGNOSIS — Z74.09 IMPAIRED MOBILITY AND ADLS: ICD-10-CM

## 2020-12-29 DIAGNOSIS — R29.898 RIGHT ARM WEAKNESS: ICD-10-CM

## 2020-12-29 DIAGNOSIS — Z87.891 FORMER SMOKER: ICD-10-CM

## 2020-12-29 DIAGNOSIS — R29.898 DECREASED GRIP STRENGTH OF RIGHT HAND: ICD-10-CM

## 2020-12-29 DIAGNOSIS — I65.22 CAROTID OCCLUSION, LEFT: ICD-10-CM

## 2020-12-29 DIAGNOSIS — Z86.73 H/O ISCHEMIC LEFT MCA STROKE: Primary | ICD-10-CM

## 2020-12-29 PROBLEM — I69.320 APHASIA S/P CVA: Status: RESOLVED | Noted: 2020-09-10 | Resolved: 2020-12-29

## 2020-12-29 PROBLEM — I63.9 CEREBROVASCULAR ACCIDENT (CVA) DUE TO EMBOLISM: Status: RESOLVED | Noted: 2020-08-22 | Resolved: 2020-12-29

## 2020-12-29 PROCEDURE — 99999 PR PBB SHADOW E&M-EST. PATIENT-LVL V: ICD-10-PCS | Mod: PBBFAC,,, | Performed by: PSYCHIATRY & NEUROLOGY

## 2020-12-29 PROCEDURE — 99999 PR PBB SHADOW E&M-EST. PATIENT-LVL V: CPT | Mod: PBBFAC,,, | Performed by: PSYCHIATRY & NEUROLOGY

## 2020-12-29 PROCEDURE — 99215 PR OFFICE/OUTPT VISIT, EST, LEVL V, 40-54 MIN: ICD-10-PCS | Mod: S$PBB,,, | Performed by: PSYCHIATRY & NEUROLOGY

## 2020-12-29 PROCEDURE — 99215 OFFICE O/P EST HI 40 MIN: CPT | Mod: S$PBB,,, | Performed by: PSYCHIATRY & NEUROLOGY

## 2020-12-29 PROCEDURE — 99215 OFFICE O/P EST HI 40 MIN: CPT | Mod: PBBFAC | Performed by: PSYCHIATRY & NEUROLOGY

## 2020-12-29 NOTE — PROGRESS NOTES
Vascular Neurology  Clinic Note    Reason For Visit (Chief Complaint): L-ICA stroke    HPI: 60 y.o. R-handed woman with HTN, HLD, L-ICA stroke (Aug 2020) who was admitted in October for transient neurological symptoms.  She is here for followup of stroke.    Patient first presented with R sided weakness and aphasia to a local ER in August, but left AMA.  She then re-presented to [facility] for the same symptoms.  MRI showed scattered small infarcts in L-hemisphere and occluded L-ICA.  She was started on DAPT, Lexapro and discharged to rehab.  She recovered all prior deficits.    Patient presented to local ER in October for R sided weakness and mild aphasia.  She underwent telestroke with Dr. Alexis and received tPA.  She was back to baseline by [place] [facility]  MRI was negative for acute infarct.  Hypoperfusion was though to explain symptoms.  She was continued on DAPT and discharged home.    Patient is back at home.  Continues to do PT/OT/Speech.  Still has some mild speech difficulties and R sided weakness.  Ambulating with a walker.  No falls.  Independent in ADLs.  Under much stress recently due to brother's unexpected death.  Successful quit smoking (cold turkey) after the stroke.  Not much of an appetite but trying to eat healthy.      Brain Imaging:  MRI Brain 8/24/20:  Several foci of diffusion involving the left cerebral hemisphere (frontal, parietal, temporal lobes and left basal ganglia) in keeping with recent small infarcts, potentially embolic in nature.       MRI Brain 10/10/20:  1. No evidence of acute ischemia or recent infarction.  2. Continued maturing infarcts in the left cerebral hemisphere relative to the 08/24/2020 MRI.  Persistent, though decreased degree of restricted diffusion is noted with multiple infarcts in the left centrum semiovale and corona radiata.  Areas of restricted diffusion previously seen in the left basal ganglia have resolved.    CTA H/N  10/9/20:  Persistent occlusion of the left internal carotid artery with collateral filling/reconstitution at the cavernous segment.  Relative diminutive appearance of the left MCA without high-grade stenosis or occlusion.  This represents interval change from 08/23/2020.     No intracranial large vessel arterial occlusion.    Cardiac Evaluation:  TTE 10/10/20:  LVEF 50%  + PFO    Relevant Labwork:  Recent Labs   Lab 10/10/20  0004   Hemoglobin A1C 5.4   LDL Cholesterol 72.0   HDL 51   Triglycerides 75   Cholesterol 138       I independently viewed the above imaging studies and  I reviewed the reports of the above imaging.  I reviewed the above labwork.    Review of Systems  Msk: negative for muscle pain  Skin: negative for pruritis  Neuro: negative for headache  All others negative    Past Medical History  Past Medical History:   Diagnosis Date    Adrenal adenoma, left 05/2017    Chronic anxiety     Chronic back pain     Closed L3 vertebral fracture 12/2017    fell 2 weeks after back surgery     COPD (chronic obstructive pulmonary disease)     CVA (cerebral vascular accident) 08/21/2020    Degenerative joint disease (DJD) of lumbar spine     Diverticulosis of colon     Elevated liver enzymes     Encounter for blood transfusion     Fibroids, intramural 02/2016    GERD (gastroesophageal reflux disease)     History of shingles 02/2016    Right flank    Hyperlipemia     Hypertension     Ischemic colitis 10/2017    Lumbar post-laminectomy syndrome     Menopausal and female climacteric states     Muscle contraction headache syndrome     Pancreatitis 04/2017    PTSD (post-traumatic stress disorder)     daughter age 7 killed in the driveway hit by a drunk     Spinal stenosis of lumbar region at multiple levels     Varicose veins of both lower extremities with inflammation     Vitamin D deficiency      Family History  Sister with breast CA.  Brother with lymphoma.     Social History  Former  "smoker (1/2 ppd).  Quit after stroke.  Previously worked as /manager.      Medication List with Changes/Refills   Current Medications    ALPRAZOLAM (XANAX) 0.5 MG TABLET    TAKE 1 TABLET BY MOUTH THREE TIMES A DAY AS NEEDED FOR ANXIETY    AMLODIPINE (NORVASC) 10 MG TABLET    Take 1 tablet (10 mg total) by mouth once daily.    ASPIRIN (ECOTRIN) 81 MG EC TABLET    Take 1 tablet (81 mg total) by mouth once daily.    ATORVASTATIN (LIPITOR) 40 MG TABLET    Take 1 tablet (40 mg total) by mouth once daily.    BENAZEPRIL (LOTENSIN) 20 MG TABLET    Take 1 tablet (20 mg total) by mouth once daily.    CLOPIDOGREL (PLAVIX) 75 MG TABLET    Take 1 tablet (75 mg total) by mouth once daily.    CYANOCOBALAMIN (VITAMIN B-12) 1000 MCG TABLET    Take 1,000 mcg by mouth once daily.    CYCLOBENZAPRINE (FLEXERIL) 10 MG TABLET    TAKE 1 TABLET BY MOUTH THREE TIMES A DAY AS NEEDED FOR MUSCLE SPASMS    ESCITALOPRAM OXALATE (LEXAPRO) 20 MG TABLET    Take 1 tablet (20 mg total) by mouth once daily.    MULTIVITAMIN CAPSULE    Take 1 capsule by mouth once daily.    SPIRONOLACTONE (ALDACTONE) 25 MG TABLET    Take 1 tablet (25 mg total) by mouth once daily.    TRIAMCINOLONE ACETONIDE 0.5% (KENALOG) 0.5 % CREA    Apply 1 application topically 2 (two) times daily.       EXAM  Vital Signs  Pulse: 89  BP: 134/86  Pain Score: 0-No pain  Height and Weight  Height: 5' 7" (170.2 cm)  Weight: 104.8 kg (231 lb)  BSA (Calculated - sq m): 2.23 sq meters  BMI (Calculated): 36.2  Weight in (lb) to have BMI = 25: 159.3]  General: obese, well appearing  Eyes: no icterus, slightly injected conjunctiva  Neck: no carotid bruits  CV: RRR, nL S1&S2  Resp: breathing comfortably, no wheezing  Ext: wwp, no pedal edema  Psych: tearful    Mental Status: Alert and oriented, normal attention, speech rarely slightly dysfluent, naming and repetition intact, follows multistep embedded commands, no e/o neglect or extinction  Cranial Nerves: PERRL, EOMI, VFF, sensation " intact, very slight R facial droop, TUP midline, SCM/trap 5/5  Motor: Normal bulk and tone, R sided drift, finger taps slower on R  R shoulder abductor  5-/5 , elbow flex 5/5, elbow ext 5-/5, wrist ext 5/5  R hip flexor 5-/5, knee flex/ex 5/5, dorsi/plantar flexion 5/5  Remainder 5/5  Sensory: intact light touch bilaterally  Coordination: no ataxia on finger-to-nose  Gait & Stance: hesitant, slightly wide based, no circumduction  DTR: 3+ R biceps, 2+ L biceps, 1+ b/l patellar    NIHSS - 2 (drift, facial)    ___________________  ASSESSMENT & PLAN  60 y.o. R-handed woman with HTN, HLD, L-ICA stroke (Aug 2020) who was admitted in October for transient neurological symptoms.  Etiology of August stroke 2/2 carotid stenosis (rupture then occlusion?).  Unclear etiology of second episode - perhaps hypoperfusion?  She is doing well functionally - nearly full strength on R side.  Has had considerable emotional stressors that have made full return to independence more difficult.  Quit smoking.  No intervention indicated for occluded carotid.    - Stop Plavix.  - Continue ASA 81 mg daily for secondary stroke prevention  - Continue atorvastatin 40 mg daily for HLD/plaque  - Continue PT/OT/SLP  - Stay well hydrated with water  - See Ophtho before resuming driving (when discharged from therapy)  - Referral to cardiology for slightly depressed EF  - Congratulated patient on quitting smoking  - Mediterranean Diet for stroke prevention.  - Return to ER For any new neurological symptoms  - RTC 6-12 mos      Problem List Items Addressed This Visit        Unprioritized    Carotid occlusion, left    H/O ischemic left MCA stroke - Primary    Decreased  strength of right hand    Right arm weakness    Impaired mobility and ADLs    Word finding difficulty    Former smoker      Other Visit Diagnoses     Acute ischemic left MCA stroke        Relevant Orders    Ambulatory referral/consult to Cardiology          Shivani Florez MD  Vascular  Neurology

## 2020-12-29 NOTE — PATIENT INSTRUCTIONS
- Stop Plavix (clopidogrel).  - Increase aspirin to 325 mg daily for stroke prevention.  - Continue atorvastatin 40 mg daily for cholesterol and plaque.  - Congratulations on quitting smoking!  - See eye doctor before you start driving again  - Referral made to Cardiology      Mediterranean Diet Recommendations    · Eat primarily plant-based foods, such as fruits and vegetables, whole grains, legumes (beans) and nuts.  · Limit refined carbohydrates (white pasta, bread, rice).  · Replace butter with healthy fats such as olive oil.  · Use herbs and spices instead of salt to flavor foods.  · Limit red meat and processed meats to no more than a few times a month.  · Avoid sugary sodas, bakery goods, and sweets.  · Eat fish and poultry at least twice a week.  · Get plenty of exercise (150 minutes per week).    Adopted from [employer], 2018.

## 2020-12-29 NOTE — LETTER
December 29, 2020      Jose Moya, NP  [address] [facility] [address]           [facility]  [address]  [facility] [address]  Phone: [phone]          Patient: Rosalia Fagan   MR Number: [id number]   YOB: 1960   Date of Visit: 12/29/2020       Dear Jose Moya:    Thank you for referring Rosalia Fagan to me for evaluation. Attached you will find relevant portions of my assessment and plan of care.    If you have questions, please do not hesitate to call me. I look forward to following Rosalia Fagan along with you.    Sincerely,    Shivani Florez MD    Enclosure  CC:  No Recipients    If you would like to receive this communication electronically, please contact [email or web address] or [phone] to request more information on [employer] access.    For providers and/or their staff who would like to refer a patient to [facility], please contact us through our one-stop-shop provider referral line, [facility] , at [phone].    If you feel you have received this communication in error or would no longer like to receive these types of communications, please e-mail [email or web address]

## 2021-01-04 PROBLEM — I69.351 HEMIPARESIS AFFECTING RIGHT SIDE AS LATE EFFECT OF CEREBROVASCULAR ACCIDENT: Status: ACTIVE | Noted: 2020-09-10

## 2021-02-11 PROBLEM — R26.9 ABNORMALITY OF GAIT FOLLOWING CEREBROVASCULAR ACCIDENT (CVA): Status: RESOLVED | Noted: 2020-09-10 | Resolved: 2021-02-11

## 2021-02-11 PROBLEM — Z74.09 IMPAIRED MOBILITY AND ADLS: Status: RESOLVED | Noted: 2020-09-10 | Resolved: 2021-02-11

## 2021-02-11 PROBLEM — M62.81 MUSCLE WEAKNESS OF LOWER EXTREMITY: Status: RESOLVED | Noted: 2020-09-10 | Resolved: 2021-02-11

## 2021-02-11 PROBLEM — I69.398 ABNORMALITY OF GAIT FOLLOWING CEREBROVASCULAR ACCIDENT (CVA): Status: RESOLVED | Noted: 2020-09-10 | Resolved: 2021-02-11

## 2021-02-11 PROBLEM — Z78.9 IMPAIRED MOBILITY AND ADLS: Status: RESOLVED | Noted: 2020-09-10 | Resolved: 2021-02-11

## 2021-02-11 PROBLEM — R27.9 LACK OF COORDINATION: Status: RESOLVED | Noted: 2020-09-10 | Resolved: 2021-02-11

## 2021-03-03 PROBLEM — G45.9 TIA (TRANSIENT ISCHEMIC ATTACK): Status: ACTIVE | Noted: 2021-03-03

## 2021-04-15 PROBLEM — N39.0 URINARY TRACT INFECTION WITHOUT HEMATURIA: Status: ACTIVE | Noted: 2021-04-15

## 2021-04-20 PROBLEM — I42.9 CARDIOMYOPATHY: Status: ACTIVE | Noted: 2021-04-20

## 2021-06-23 PROBLEM — R41.0 DISORIENTATION: Status: ACTIVE | Noted: 2021-06-23

## 2021-06-23 PROBLEM — I63.9 STROKE: Status: ACTIVE | Noted: 2021-06-23

## 2021-06-25 PROBLEM — E66.9 OBESITY, CLASS II, BMI 35-39.9: Status: ACTIVE | Noted: 2021-06-25

## 2021-06-25 PROBLEM — I24.0 CORONARY ARTERY OCCLUSION: Status: ACTIVE | Noted: 2021-06-25

## 2021-06-25 PROBLEM — Z79.899 POLYPHARMACY: Status: ACTIVE | Noted: 2021-06-25

## 2021-06-25 PROBLEM — F13.20 BENZODIAZEPINE DEPENDENCE: Status: ACTIVE | Noted: 2021-06-25

## 2021-06-25 PROBLEM — E66.812 OBESITY, CLASS II, BMI 35-39.9: Status: ACTIVE | Noted: 2021-06-25

## 2021-06-25 PROBLEM — I25.10 CAD (CORONARY ARTERY DISEASE): Status: ACTIVE | Noted: 2021-06-25

## 2021-06-25 PROBLEM — G93.41 ACUTE METABOLIC ENCEPHALOPATHY: Status: ACTIVE | Noted: 2021-06-25

## 2021-06-25 PROBLEM — F33.0 MDD (MAJOR DEPRESSIVE DISORDER), RECURRENT EPISODE, MILD: Status: ACTIVE | Noted: 2021-06-25

## 2021-06-25 PROBLEM — I20.0 UNSTABLE ANGINA: Status: ACTIVE | Noted: 2021-06-25

## 2021-07-05 PROBLEM — K86.1 CHRONIC PANCREATITIS: Status: ACTIVE | Noted: 2021-07-05

## 2021-07-21 DIAGNOSIS — Q21.12 PFO (PATENT FORAMEN OVALE): Primary | ICD-10-CM

## 2021-07-21 DIAGNOSIS — I20.0 UNSTABLE ANGINA: ICD-10-CM

## 2021-07-21 DIAGNOSIS — I42.9 CARDIOMYOPATHY, UNSPECIFIED TYPE: ICD-10-CM

## 2021-08-03 ENCOUNTER — HOSPITAL ENCOUNTER (OUTPATIENT)
Dept: CARDIOLOGY | Facility: HOSPITAL | Age: 61
Discharge: HOME | End: 2021-08-03
Attending: INTERNAL MEDICINE
Payer: MEDICAID

## 2021-08-03 ENCOUNTER — CLINICAL SUPPORT (OUTPATIENT)
Dept: CARDIOLOGY | Facility: HOSPITAL | Age: 61
End: 2021-08-03
Attending: INTERNAL MEDICINE
Payer: MEDICAID

## 2021-08-03 ENCOUNTER — OFFICE VISIT (OUTPATIENT)
Dept: CARDIOLOGY | Facility: CLINIC | Age: 61
End: 2021-08-03
Payer: MEDICAID

## 2021-08-03 VITALS
HEART RATE: 80 BPM | HEART RATE: 78 BPM | SYSTOLIC BLOOD PRESSURE: 142 MMHG | HEIGHT: 68 IN | DIASTOLIC BLOOD PRESSURE: 66 MMHG | BODY MASS INDEX: 36.68 KG/M2 | HEIGHT: 68 IN | WEIGHT: 240.06 LBS | DIASTOLIC BLOOD PRESSURE: 84 MMHG | BODY MASS INDEX: 36.38 KG/M2 | WEIGHT: 242 LBS | OXYGEN SATURATION: 99 % | SYSTOLIC BLOOD PRESSURE: 131 MMHG

## 2021-08-03 DIAGNOSIS — I25.110 CORONARY ARTERY DISEASE INVOLVING NATIVE CORONARY ARTERY OF NATIVE HEART WITH UNSTABLE ANGINA PECTORIS: ICD-10-CM

## 2021-08-03 DIAGNOSIS — Z86.73 H/O ISCHEMIC LEFT MCA STROKE: Primary | ICD-10-CM

## 2021-08-03 DIAGNOSIS — I20.0 UNSTABLE ANGINA: ICD-10-CM

## 2021-08-03 DIAGNOSIS — I42.9 CARDIOMYOPATHY, UNSPECIFIED TYPE: ICD-10-CM

## 2021-08-03 DIAGNOSIS — Q21.12 PFO (PATENT FORAMEN OVALE): ICD-10-CM

## 2021-08-03 DIAGNOSIS — I10 ESSENTIAL HYPERTENSION: Chronic | ICD-10-CM

## 2021-08-03 DIAGNOSIS — G45.9 TIA (TRANSIENT ISCHEMIC ATTACK): ICD-10-CM

## 2021-08-03 DIAGNOSIS — Q21.12 PFO (PATENT FORAMEN OVALE): Primary | ICD-10-CM

## 2021-08-03 LAB
ASCENDING AORTA: 3.32 CM
AV INDEX (PROSTH): 0.81
AV MEAN GRADIENT: 2 MMHG
AV PEAK GRADIENT: 4 MMHG
AV VALVE AREA: 3.42 CM2
AV VELOCITY RATIO: 0.72
BSA FOR ECHO PROCEDURE: 2.29 M2
CV ECHO LV RWT: 0.29 CM
DOP CALC AO PEAK VEL: 0.94 M/S
DOP CALC AO VTI: 14.94 CM
DOP CALC LVOT AREA: 4.2 CM2
DOP CALC LVOT DIAMETER: 2.32 CM
DOP CALC LVOT PEAK VEL: 0.68 M/S
DOP CALC LVOT STROKE VOLUME: 51.04 CM3
DOP CALCLVOT PEAK VEL VTI: 12.08 CM
E WAVE DECELERATION TIME: 135.34 MSEC
E/A RATIO: 0.51
E/E' RATIO: 4.13 M/S
ECHO LV POSTERIOR WALL: 0.65 CM (ref 0.6–1.1)
EJECTION FRACTION: 50 %
FRACTIONAL SHORTENING: 22 % (ref 28–44)
INTERVENTRICULAR SEPTUM: 0.83 CM (ref 0.6–1.1)
IVRT: 102.76 MSEC
LA MAJOR: 4.67 CM
LA MINOR: 4.55 CM
LA WIDTH: 3.86 CM
LEFT ATRIUM SIZE: 3.34 CM
LEFT ATRIUM VOLUME INDEX MOD: 20 ML/M2
LEFT ATRIUM VOLUME INDEX: 22.8 ML/M2
LEFT ATRIUM VOLUME MOD: 44.42 CM3
LEFT ATRIUM VOLUME: 50.51 CM3
LEFT INTERNAL DIMENSION IN SYSTOLE: 3.51 CM (ref 2.1–4)
LEFT VENTRICLE DIASTOLIC VOLUME INDEX: 41.72 ML/M2
LEFT VENTRICLE DIASTOLIC VOLUME: 92.61 ML
LEFT VENTRICLE MASS INDEX: 46 G/M2
LEFT VENTRICLE SYSTOLIC VOLUME INDEX: 23.1 ML/M2
LEFT VENTRICLE SYSTOLIC VOLUME: 51.3 ML
LEFT VENTRICULAR INTERNAL DIMENSION IN DIASTOLE: 4.5 CM (ref 3.5–6)
LEFT VENTRICULAR MASS: 102.7 G
LV LATERAL E/E' RATIO: 4.43 M/S
LV SEPTAL E/E' RATIO: 3.88 M/S
MV A" WAVE DURATION": 9.42 MSEC
MV PEAK A VEL: 0.61 M/S
MV PEAK E VEL: 0.31 M/S
MV STENOSIS PRESSURE HALF TIME: 39.25 MS
MV VALVE AREA P 1/2 METHOD: 5.61 CM2
PISA TR MAX VEL: 1.84 M/S
PULM VEIN S/D RATIO: 1.71
PV PEAK D VEL: 0.28 M/S
PV PEAK S VEL: 0.48 M/S
RA MAJOR: 4.04 CM
RA PRESSURE: 3 MMHG
RA WIDTH: 3.61 CM
RIGHT VENTRICULAR END-DIASTOLIC DIMENSION: 2.89 CM
RV TISSUE DOPPLER FREE WALL SYSTOLIC VELOCITY 1 (APICAL 4 CHAMBER VIEW): 9.35 CM/S
SINUS: 3.49 CM
STJ: 3.04 CM
TDI LATERAL: 0.07 M/S
TDI SEPTAL: 0.08 M/S
TDI: 0.08 M/S
TR MAX PG: 14 MMHG
TRICUSPID ANNULAR PLANE SYSTOLIC EXCURSION: 1.92 CM
TV REST PULMONARY ARTERY PRESSURE: 17 MMHG

## 2021-08-03 PROCEDURE — 93272 CARDIAC EVENT MONITOR (CUPID ONLY): ICD-10-PCS | Mod: ,,, | Performed by: INTERNAL MEDICINE

## 2021-08-03 PROCEDURE — 99213 OFFICE O/P EST LOW 20 MIN: CPT | Mod: PBBFAC,25 | Performed by: INTERNAL MEDICINE

## 2021-08-03 PROCEDURE — 99205 OFFICE O/P NEW HI 60 MIN: CPT | Mod: S$PBB,,, | Performed by: INTERNAL MEDICINE

## 2021-08-03 PROCEDURE — 99205 PR OFFICE/OUTPT VISIT, NEW, LEVL V, 60-74 MIN: ICD-10-PCS | Mod: S$PBB,,, | Performed by: INTERNAL MEDICINE

## 2021-08-03 PROCEDURE — 93306 TTE W/DOPPLER COMPLETE: CPT | Mod: 26,,, | Performed by: INTERNAL MEDICINE

## 2021-08-03 PROCEDURE — 93306 TTE W/DOPPLER COMPLETE: CPT

## 2021-08-03 PROCEDURE — 93271 ECG/MONITORING AND ANALYSIS: CPT

## 2021-08-03 PROCEDURE — 99999 PR PBB SHADOW E&M-EST. PATIENT-LVL III: CPT | Mod: PBBFAC,,, | Performed by: INTERNAL MEDICINE

## 2021-08-03 PROCEDURE — 93306 ECHO (CUPID ONLY): ICD-10-PCS | Mod: 26,,, | Performed by: INTERNAL MEDICINE

## 2021-08-03 PROCEDURE — 93272 ECG/REVIEW INTERPRET ONLY: CPT | Mod: ,,, | Performed by: INTERNAL MEDICINE

## 2021-08-03 PROCEDURE — 99999 PR PBB SHADOW E&M-EST. PATIENT-LVL III: ICD-10-PCS | Mod: PBBFAC,,, | Performed by: INTERNAL MEDICINE

## 2021-09-14 ENCOUNTER — TELEPHONE (OUTPATIENT)
Dept: CARDIOLOGY | Facility: CLINIC | Age: 61
End: 2021-09-14

## 2021-10-05 ENCOUNTER — DOCUMENTATION ONLY (OUTPATIENT)
Dept: CARDIOLOGY | Facility: CLINIC | Age: 61
End: 2021-10-05

## 2021-10-05 ENCOUNTER — OFFICE VISIT (OUTPATIENT)
Dept: CARDIOLOGY | Facility: CLINIC | Age: 61
End: 2021-10-05
Payer: MEDICAID

## 2021-10-05 VITALS
SYSTOLIC BLOOD PRESSURE: 121 MMHG | WEIGHT: 244.69 LBS | OXYGEN SATURATION: 98 % | HEART RATE: 62 BPM | DIASTOLIC BLOOD PRESSURE: 63 MMHG | BODY MASS INDEX: 37.21 KG/M2

## 2021-10-05 DIAGNOSIS — Q21.12 PFO (PATENT FORAMEN OVALE): Primary | ICD-10-CM

## 2021-10-05 DIAGNOSIS — Q21.12 PFO (PATENT FORAMEN OVALE): ICD-10-CM

## 2021-10-05 PROCEDURE — 99215 OFFICE O/P EST HI 40 MIN: CPT | Mod: S$PBB,,, | Performed by: INTERNAL MEDICINE

## 2021-10-05 PROCEDURE — 99215 PR OFFICE/OUTPT VISIT, EST, LEVL V, 40-54 MIN: ICD-10-PCS | Mod: S$PBB,,, | Performed by: INTERNAL MEDICINE

## 2021-10-05 PROCEDURE — 99999 PR PBB SHADOW E&M-EST. PATIENT-LVL IV: CPT | Mod: PBBFAC,,, | Performed by: INTERNAL MEDICINE

## 2021-10-05 PROCEDURE — 99999 PR PBB SHADOW E&M-EST. PATIENT-LVL IV: ICD-10-PCS | Mod: PBBFAC,,, | Performed by: INTERNAL MEDICINE

## 2021-10-05 PROCEDURE — 99214 OFFICE O/P EST MOD 30 MIN: CPT | Mod: PBBFAC | Performed by: INTERNAL MEDICINE

## 2021-10-17 PROBLEM — E66.9 OBESITY, CLASS II, BMI 35-39.9: Status: RESOLVED | Noted: 2021-06-25 | Resolved: 2021-10-17

## 2021-10-17 PROBLEM — E66.812 OBESITY, CLASS II, BMI 35-39.9: Status: RESOLVED | Noted: 2021-06-25 | Resolved: 2021-10-17

## 2021-10-17 PROBLEM — G93.41 ACUTE METABOLIC ENCEPHALOPATHY: Status: RESOLVED | Noted: 2021-06-25 | Resolved: 2021-10-17

## 2021-10-25 ENCOUNTER — HOSPITAL ENCOUNTER (OUTPATIENT)
Dept: PREADMISSION TESTING | Facility: HOSPITAL | Age: 61
Discharge: HOME | End: 2021-10-25
Attending: INTERNAL MEDICINE
Payer: MEDICAID

## 2021-10-25 DIAGNOSIS — Z01.818 PRE-OP TESTING: ICD-10-CM

## 2021-10-25 LAB
SARS-COV-2 RNA RESP QL NAA+PROBE: NOT DETECTED
SARS-COV-2- CYCLE NUMBER: NORMAL

## 2021-10-25 PROCEDURE — U0005 INFEC AGEN DETEC AMPLI PROBE: HCPCS | Performed by: INTERNAL MEDICINE

## 2021-10-25 PROCEDURE — U0003 INFECTIOUS AGENT DETECTION BY NUCLEIC ACID (DNA OR RNA); SEVERE ACUTE RESPIRATORY SYNDROME CORONAVIRUS 2 (SARS-COV-2) (CORONAVIRUS DISEASE [COVID-19]), AMPLIFIED PROBE TECHNIQUE, MAKING USE OF HIGH THROUGHPUT TECHNOLOGIES AS DESCRIBED BY CMS-2020-01-R: HCPCS | Performed by: INTERNAL MEDICINE

## 2021-10-27 ENCOUNTER — HOSPITAL ENCOUNTER (INPATIENT)
Facility: HOSPITAL | Age: 61
LOS: 1 days | Discharge: HOME | DRG: 951 | End: 2021-10-27
Attending: INTERNAL MEDICINE | Admitting: INTERNAL MEDICINE
Payer: MEDICAID

## 2021-10-27 VITALS
WEIGHT: 233 LBS | DIASTOLIC BLOOD PRESSURE: 61 MMHG | RESPIRATION RATE: 16 BRPM | SYSTOLIC BLOOD PRESSURE: 128 MMHG | HEIGHT: 67 IN | BODY MASS INDEX: 36.57 KG/M2 | HEART RATE: 57 BPM | TEMPERATURE: 98 F | OXYGEN SATURATION: 96 %

## 2021-10-27 DIAGNOSIS — Q21.12 PFO (PATENT FORAMEN OVALE): ICD-10-CM

## 2021-10-27 DIAGNOSIS — G45.9 TIA (TRANSIENT ISCHEMIC ATTACK): Primary | ICD-10-CM

## 2021-10-27 PROCEDURE — 93662 INTRACARDIAC ECG (ICE): CPT | Performed by: INTERNAL MEDICINE

## 2021-10-27 PROCEDURE — 63600175 PHARM REV CODE 636 W HCPCS: Performed by: INTERNAL MEDICINE

## 2021-10-27 PROCEDURE — 93662 PR INTRACARD ECHO, THER/DX INTERVENT: ICD-10-PCS | Mod: 26,,, | Performed by: INTERNAL MEDICINE

## 2021-10-27 PROCEDURE — 93662 INTRACARDIAC ECG (ICE): CPT | Mod: 26,,, | Performed by: INTERNAL MEDICINE

## 2021-10-27 PROCEDURE — 99152 MOD SED SAME PHYS/QHP 5/>YRS: CPT | Mod: ,,, | Performed by: INTERNAL MEDICINE

## 2021-10-27 PROCEDURE — C1769 GUIDE WIRE: HCPCS | Performed by: INTERNAL MEDICINE

## 2021-10-27 PROCEDURE — 93580 TRANSCATH CLOSURE OF ASD: CPT | Performed by: INTERNAL MEDICINE

## 2021-10-27 PROCEDURE — 99152 MOD SED SAME PHYS/QHP 5/>YRS: CPT | Performed by: INTERNAL MEDICINE

## 2021-10-27 PROCEDURE — 25000003 PHARM REV CODE 250: Performed by: INTERNAL MEDICINE

## 2021-10-27 PROCEDURE — 99152 PR MOD CONSCIOUS SEDATION, SAME PHYS, 5+ YRS, FIRST 15 MIN: ICD-10-PCS | Mod: ,,, | Performed by: INTERNAL MEDICINE

## 2021-10-27 PROCEDURE — 93580 PR PERC CLOS,CONG INTERATRIAL COMMUN W/IMPL: ICD-10-PCS | Mod: ,,, | Performed by: INTERNAL MEDICINE

## 2021-10-27 PROCEDURE — C1753 CATH, INTRAVAS ULTRASOUND: HCPCS | Performed by: INTERNAL MEDICINE

## 2021-10-27 PROCEDURE — C1894 INTRO/SHEATH, NON-LASER: HCPCS | Performed by: INTERNAL MEDICINE

## 2021-10-27 PROCEDURE — 93580 TRANSCATH CLOSURE OF ASD: CPT | Mod: ,,, | Performed by: INTERNAL MEDICINE

## 2021-10-27 PROCEDURE — 11000001 HC ACUTE MED/SURG PRIVATE ROOM

## 2021-10-27 PROCEDURE — 25500020 PHARM REV CODE 255: Performed by: INTERNAL MEDICINE

## 2021-10-27 RX ORDER — SODIUM CHLORIDE 9 MG/ML
INJECTION, SOLUTION INTRAVENOUS CONTINUOUS
Status: DISCONTINUED | OUTPATIENT
Start: 2021-10-27 | End: 2021-10-27 | Stop reason: HOSPADM

## 2021-10-27 RX ORDER — SODIUM CHLORIDE 9 MG/ML
INJECTION, SOLUTION INTRAVENOUS CONTINUOUS
Status: DISCONTINUED | OUTPATIENT
Start: 2021-10-27 | End: 2021-10-27

## 2021-10-27 RX ORDER — HEPARIN SOD,PORCINE/0.9 % NACL 1000/500ML
INTRAVENOUS SOLUTION INTRAVENOUS
Status: DISCONTINUED | OUTPATIENT
Start: 2021-10-27 | End: 2021-10-27 | Stop reason: HOSPADM

## 2021-10-27 RX ORDER — MIDAZOLAM HYDROCHLORIDE 2 MG/2ML
INJECTION, SOLUTION INTRAMUSCULAR; INTRAVENOUS
Status: DISCONTINUED | OUTPATIENT
Start: 2021-10-27 | End: 2021-10-27 | Stop reason: HOSPADM

## 2021-10-27 RX ORDER — ACETAMINOPHEN 325 MG/1
650 TABLET ORAL EVERY 4 HOURS PRN
Status: DISCONTINUED | OUTPATIENT
Start: 2021-10-27 | End: 2021-10-27 | Stop reason: HOSPADM

## 2021-10-27 RX ORDER — CLOPIDOGREL BISULFATE 75 MG/1
75 TABLET ORAL ONCE
Status: DISCONTINUED | OUTPATIENT
Start: 2021-10-27 | End: 2021-10-27 | Stop reason: HOSPADM

## 2021-10-27 RX ORDER — FENTANYL CITRATE 50 UG/ML
INJECTION, SOLUTION INTRAMUSCULAR; INTRAVENOUS
Status: DISCONTINUED | OUTPATIENT
Start: 2021-10-27 | End: 2021-10-27 | Stop reason: HOSPADM

## 2021-10-27 RX ORDER — LIDOCAINE HYDROCHLORIDE 20 MG/ML
INJECTION, SOLUTION EPIDURAL; INFILTRATION; INTRACAUDAL; PERINEURAL
Status: DISCONTINUED | OUTPATIENT
Start: 2021-10-27 | End: 2021-10-27 | Stop reason: HOSPADM

## 2021-10-27 RX ORDER — ONDANSETRON 8 MG/1
8 TABLET, ORALLY DISINTEGRATING ORAL EVERY 8 HOURS PRN
Status: DISCONTINUED | OUTPATIENT
Start: 2021-10-27 | End: 2021-10-27 | Stop reason: HOSPADM

## 2021-10-27 RX ORDER — DIPHENHYDRAMINE HCL 50 MG
50 CAPSULE ORAL ONCE
Status: COMPLETED | OUTPATIENT
Start: 2021-10-27 | End: 2021-10-27

## 2021-10-27 RX ADMIN — DIPHENHYDRAMINE HYDROCHLORIDE 50 MG: 50 CAPSULE ORAL at 10:10

## 2021-10-27 RX ADMIN — SODIUM CHLORIDE: 0.9 INJECTION, SOLUTION INTRAVENOUS at 10:10

## 2021-12-22 PROBLEM — I63.412 CEREBROVASCULAR ACCIDENT (CVA) DUE TO EMBOLISM OF LEFT MIDDLE CEREBRAL ARTERY: Status: ACTIVE | Noted: 2020-08-22

## 2022-04-24 PROBLEM — N39.0 URINARY TRACT INFECTION WITHOUT HEMATURIA: Status: RESOLVED | Noted: 2021-04-15 | Resolved: 2022-04-24

## 2022-04-24 PROBLEM — F13.20 BENZODIAZEPINE DEPENDENCE: Status: RESOLVED | Noted: 2021-06-25 | Resolved: 2022-04-24

## 2022-04-24 PROBLEM — Z79.899 POLYPHARMACY: Status: RESOLVED | Noted: 2021-06-25 | Resolved: 2022-04-24

## 2022-04-25 PROBLEM — M17.11 PRIMARY OSTEOARTHRITIS OF RIGHT KNEE: Status: ACTIVE | Noted: 2022-04-25

## 2022-06-15 PROBLEM — M47.816 DEGENERATIVE JOINT DISEASE (DJD) OF LUMBAR SPINE: Chronic | Status: RESOLVED | Noted: 2017-04-30 | Resolved: 2022-06-15

## 2022-06-15 PROBLEM — I20.0 UNSTABLE ANGINA: Status: RESOLVED | Noted: 2021-06-25 | Resolved: 2022-06-15

## 2022-06-15 PROBLEM — G89.29 CHRONIC BILATERAL LOW BACK PAIN WITH BILATERAL SCIATICA: Status: ACTIVE | Noted: 2022-06-15

## 2022-06-15 PROBLEM — M54.42 CHRONIC BILATERAL LOW BACK PAIN WITH BILATERAL SCIATICA: Status: ACTIVE | Noted: 2022-06-15

## 2022-06-15 PROBLEM — I25.110 CORONARY ARTERY DISEASE INVOLVING NATIVE CORONARY ARTERY OF NATIVE HEART WITH UNSTABLE ANGINA PECTORIS: Status: ACTIVE | Noted: 2021-06-25

## 2022-06-15 PROBLEM — M47.26 OSTEOARTHRITIS OF SPINE WITH RADICULOPATHY, LUMBAR REGION: Status: ACTIVE | Noted: 2022-06-15

## 2022-06-15 PROBLEM — M54.41 CHRONIC BILATERAL LOW BACK PAIN WITH BILATERAL SCIATICA: Status: ACTIVE | Noted: 2022-06-15

## 2022-09-04 PROBLEM — M41.57 SCOLIOSIS OF LUMBOSACRAL REGION DUE TO DEGENERATIVE DISEASE OF SPINE IN ADULT: Status: ACTIVE | Noted: 2022-09-04

## 2023-02-22 PROBLEM — D89.89 CHRONIC FATIGUE AND IMMUNE DYSFUNCTION SYNDROME: Status: ACTIVE | Noted: 2023-02-22

## 2023-02-22 PROBLEM — E66.01 SEVERE OBESITY (BMI 35.0-39.9) WITH COMORBIDITY: Status: ACTIVE | Noted: 2023-02-22

## 2023-02-22 PROBLEM — G93.32 CHRONIC FATIGUE AND IMMUNE DYSFUNCTION SYNDROME: Status: ACTIVE | Noted: 2023-02-22

## 2023-02-22 PROBLEM — I24.0 CORONARY ARTERY OCCLUSION: Status: RESOLVED | Noted: 2021-06-25 | Resolved: 2023-02-22

## 2023-05-23 PROBLEM — I25.119 CORONARY ARTERY DISEASE INVOLVING NATIVE CORONARY ARTERY OF NATIVE HEART WITH ANGINA PECTORIS: Status: ACTIVE | Noted: 2021-06-25

## 2023-07-07 PROBLEM — R07.9 CHEST PAIN: Status: ACTIVE | Noted: 2023-07-07

## 2023-08-29 PROBLEM — R07.9 CHEST PAIN: Status: RESOLVED | Noted: 2023-07-07 | Resolved: 2023-08-29

## 2023-10-10 PROBLEM — R73.9 HYPERGLYCEMIA: Status: ACTIVE | Noted: 2023-10-10

## 2023-10-10 PROBLEM — Z83.3 FAMILY HISTORY OF TYPE 2 DIABETES MELLITUS: Status: ACTIVE | Noted: 2023-10-10

## 2024-01-23 PROBLEM — E66.01 SEVERE OBESITY (BMI 35.0-39.9) WITH COMORBIDITY: Status: RESOLVED | Noted: 2023-02-22 | Resolved: 2024-01-23

## 2024-03-11 PROBLEM — E88.810 METABOLIC SYNDROME: Status: ACTIVE | Noted: 2024-03-11

## 2024-06-12 PROBLEM — M48.061 SPINAL STENOSIS OF LUMBAR REGION AT MULTIPLE LEVELS: Status: ACTIVE | Noted: 2022-06-15

## 2024-06-12 PROBLEM — G93.6 CYTOTOXIC BRAIN EDEMA: Status: RESOLVED | Noted: 2020-10-10 | Resolved: 2024-06-12

## 2024-06-12 PROBLEM — J44.9 COPD (CHRONIC OBSTRUCTIVE PULMONARY DISEASE): Status: ACTIVE | Noted: 2020-12-29

## 2024-06-12 PROBLEM — Z83.3 FAMILY HISTORY OF TYPE 2 DIABETES MELLITUS: Status: RESOLVED | Noted: 2023-10-10 | Resolved: 2024-06-12

## 2024-06-12 PROBLEM — Z78.9 IMPAIRED INSTRUMENTAL ACTIVITIES OF DAILY LIVING (IADL): Status: RESOLVED | Noted: 2020-09-10 | Resolved: 2024-06-12

## 2024-06-13 PROBLEM — G45.9 TIA (TRANSIENT ISCHEMIC ATTACK): Status: RESOLVED | Noted: 2021-03-03 | Resolved: 2024-06-13

## 2024-09-16 PROBLEM — I63.412 CEREBROVASCULAR ACCIDENT (CVA) DUE TO EMBOLISM OF LEFT MIDDLE CEREBRAL ARTERY: Status: RESOLVED | Noted: 2020-08-22 | Resolved: 2024-09-16

## 2024-09-20 PROBLEM — Q21.12 PFO (PATENT FORAMEN OVALE): Status: RESOLVED | Noted: 2020-08-25 | Resolved: 2024-09-20

## 2024-09-20 PROBLEM — Z78.9 DECREASED ACTIVITIES OF DAILY LIVING (ADL): Status: RESOLVED | Noted: 2020-09-10 | Resolved: 2024-09-20

## 2024-09-20 PROBLEM — E88.810 METABOLIC SYNDROME: Status: RESOLVED | Noted: 2024-03-11 | Resolved: 2024-09-20

## 2025-01-13 PROBLEM — Z91.81 AT RISK FOR FALLING: Status: ACTIVE | Noted: 2025-01-13

## 2025-01-13 PROBLEM — Z78.9 DIFFICULTY NAVIGATING STAIRS: Status: ACTIVE | Noted: 2025-01-13

## 2025-01-13 PROBLEM — R26.89 ABNORMALITY OF GAIT DUE TO IMPAIRMENT OF BALANCE: Status: ACTIVE | Noted: 2025-01-13

## 2025-01-13 PROBLEM — R26.2 DIFFICULTY WALKING: Status: ACTIVE | Noted: 2025-01-13

## 2025-03-10 PROBLEM — I69.398 ABNORMALITY OF GAIT FOLLOWING CEREBROVASCULAR ACCIDENT (CVA): Status: RESOLVED | Noted: 2020-09-10 | Resolved: 2025-03-10

## 2025-03-10 PROBLEM — R26.2 DIFFICULTY WALKING: Status: RESOLVED | Noted: 2025-01-13 | Resolved: 2025-03-10

## 2025-03-10 PROBLEM — Z78.9 DIFFICULTY NAVIGATING STAIRS: Status: RESOLVED | Noted: 2025-01-13 | Resolved: 2025-03-10

## 2025-03-10 PROBLEM — R26.9 ABNORMALITY OF GAIT FOLLOWING CEREBROVASCULAR ACCIDENT (CVA): Status: RESOLVED | Noted: 2020-09-10 | Resolved: 2025-03-10

## 2025-03-10 PROBLEM — R26.89 ABNORMALITY OF GAIT DUE TO IMPAIRMENT OF BALANCE: Status: RESOLVED | Noted: 2025-01-13 | Resolved: 2025-03-10

## 2025-03-10 PROBLEM — I69.351 HEMIPARESIS AFFECTING RIGHT SIDE AS LATE EFFECT OF CEREBROVASCULAR ACCIDENT: Status: RESOLVED | Noted: 2020-09-10 | Resolved: 2025-03-10

## 2025-03-10 PROBLEM — Z91.81 AT RISK FOR FALLING: Status: RESOLVED | Noted: 2025-01-13 | Resolved: 2025-03-10

## 2025-03-30 PROBLEM — I42.9 CARDIOMYOPATHY: Status: RESOLVED | Noted: 2021-04-20 | Resolved: 2025-03-30

## 2025-03-30 PROBLEM — E66.01 CLASS 2 SEVERE OBESITY DUE TO EXCESS CALORIES WITH SERIOUS COMORBIDITY AND BODY MASS INDEX (BMI) OF 35.0 TO 35.9 IN ADULT: Status: ACTIVE | Noted: 2025-03-30

## 2025-03-30 PROBLEM — E66.812 CLASS 2 SEVERE OBESITY DUE TO EXCESS CALORIES WITH SERIOUS COMORBIDITY AND BODY MASS INDEX (BMI) OF 35.0 TO 35.9 IN ADULT: Status: ACTIVE | Noted: 2025-03-30

## 2025-03-31 PROBLEM — K86.1 CHRONIC PANCREATITIS: Status: ACTIVE | Noted: 2017-04-01

## 2025-03-31 PROBLEM — I63.412 CEREBROVASCULAR ACCIDENT (CVA) DUE TO EMBOLISM OF LEFT MIDDLE CEREBRAL ARTERY: Chronic | Status: ACTIVE | Noted: 2020-08-22

## (undated) DEVICE — CATH MPA2 INFINITI 4FR 100CM

## (undated) DEVICE — VISE RADIFOCUS MULTI TORQUE

## (undated) DEVICE — OMNIPAQUE 350 200ML

## (undated) DEVICE — KIT PROBE COVER WITH GEL

## (undated) DEVICE — GUIDEWIRE EMERALD 150CM PTFE

## (undated) DEVICE — PROTECTION STATION PLUS

## (undated) DEVICE — GUIDEWIRE STD .035X180CM ANG

## (undated) DEVICE — CATH 5FR MP 100CM

## (undated) DEVICE — KIT CUSTOM MANIFOLD

## (undated) DEVICE — REPROCESSED CATH ACUNAV 8FR

## (undated) DEVICE — KIT MICROINTRO 4F .018X40X7CM

## (undated) DEVICE — SPIKE CONTRAST CONTROLLER

## (undated) DEVICE — SHEATH BRITE TIP 9F 35CM

## (undated) DEVICE — SHEATH INTRODUCER 6FR 11CM

## (undated) DEVICE — COVER DRAPE ACUSON STERILE

## (undated) DEVICE — SEE MEDLINE ITEM 156894